# Patient Record
Sex: FEMALE | Race: WHITE | Employment: OTHER | URBAN - METROPOLITAN AREA
[De-identification: names, ages, dates, MRNs, and addresses within clinical notes are randomized per-mention and may not be internally consistent; named-entity substitution may affect disease eponyms.]

---

## 2018-03-15 ENCOUNTER — HOSPITAL ENCOUNTER (EMERGENCY)
Age: 73
Discharge: LEFT BEFORE DISCHARGE | End: 2018-03-15

## 2018-03-15 VITALS
WEIGHT: 133 LBS | HEART RATE: 107 BPM | RESPIRATION RATE: 14 BRPM | HEIGHT: 63 IN | BODY MASS INDEX: 23.57 KG/M2 | TEMPERATURE: 97.6 F | DIASTOLIC BLOOD PRESSURE: 88 MMHG | OXYGEN SATURATION: 99 % | SYSTOLIC BLOOD PRESSURE: 146 MMHG

## 2020-07-31 ENCOUNTER — HOSPITAL ENCOUNTER (OUTPATIENT)
Age: 75
Discharge: HOME | End: 2020-07-31
Payer: MEDICARE

## 2020-07-31 LAB
BASOPHILS ABSOLUTE: 0.02 E9/L (ref 0–0.2)
BASOPHILS RELATIVE PERCENT: 0.4 % (ref 0–2)
EOSINOPHILS ABSOLUTE: 0.1 E9/L (ref 0.05–0.5)
EOSINOPHILS RELATIVE PERCENT: 1.8 % (ref 0–6)
HCT VFR BLD CALC: 32.6 % (ref 34–48)
HEMOGLOBIN: 9.8 G/DL (ref 11.5–15.5)
IMMATURE GRANULOCYTES #: 0.04 E9/L
IMMATURE GRANULOCYTES %: 0.7 % (ref 0–5)
LYMPHOCYTES ABSOLUTE: 0.88 E9/L (ref 1.5–4)
LYMPHOCYTES RELATIVE PERCENT: 16.1 % (ref 20–42)
MCH RBC QN AUTO: 26.1 PG (ref 26–35)
MCHC RBC AUTO-ENTMCNC: 30.1 % (ref 32–34.5)
MCV RBC AUTO: 86.7 FL (ref 80–99.9)
MONOCYTES ABSOLUTE: 0.48 E9/L (ref 0.1–0.95)
MONOCYTES RELATIVE PERCENT: 8.8 % (ref 2–12)
NEUTROPHILS ABSOLUTE: 3.96 E9/L (ref 1.8–7.3)
NEUTROPHILS RELATIVE PERCENT: 72.2 % (ref 43–80)
PDW BLD-RTO: 17.5 FL (ref 11.5–15)
PLATELET # BLD: 210 E9/L (ref 130–450)
PMV BLD AUTO: 9.8 FL (ref 7–12)
RBC # BLD: 3.76 E12/L (ref 3.5–5.5)
WBC # BLD: 5.5 E9/L (ref 4.5–11.5)

## 2020-07-31 PROCEDURE — 85025 COMPLETE CBC W/AUTO DIFF WBC: CPT

## 2020-07-31 PROCEDURE — 36415 COLL VENOUS BLD VENIPUNCTURE: CPT

## 2020-11-07 ENCOUNTER — APPOINTMENT (OUTPATIENT)
Dept: GENERAL RADIOLOGY | Age: 75
DRG: 281 | End: 2020-11-07
Payer: MEDICARE

## 2020-11-07 ENCOUNTER — HOSPITAL ENCOUNTER (INPATIENT)
Age: 75
LOS: 3 days | Discharge: ANOTHER HOSPITAL | DRG: 281 | End: 2020-11-10
Attending: EMERGENCY MEDICINE | Admitting: FAMILY MEDICINE
Payer: MEDICARE

## 2020-11-07 PROBLEM — R77.8 ELEVATED TROPONIN: Status: ACTIVE | Noted: 2020-11-07

## 2020-11-07 PROBLEM — R79.89 ELEVATED TROPONIN: Status: ACTIVE | Noted: 2020-11-07

## 2020-11-07 LAB
ALBUMIN SERPL-MCNC: 4.2 G/DL (ref 3.5–5.2)
ALP BLD-CCNC: 52 U/L (ref 35–104)
ALT SERPL-CCNC: 11 U/L (ref 0–32)
ANION GAP SERPL CALCULATED.3IONS-SCNC: 11 MMOL/L (ref 7–16)
APTT: 30.5 SEC (ref 24.5–35.1)
APTT: 41.4 SEC (ref 24.5–35.1)
AST SERPL-CCNC: 26 U/L (ref 0–31)
BASOPHILS ABSOLUTE: 0.03 E9/L (ref 0–0.2)
BASOPHILS RELATIVE PERCENT: 0.5 % (ref 0–2)
BILIRUB SERPL-MCNC: 0.3 MG/DL (ref 0–1.2)
BUN BLDV-MCNC: 23 MG/DL (ref 8–23)
CALCIUM SERPL-MCNC: 9.6 MG/DL (ref 8.6–10.2)
CHLORIDE BLD-SCNC: 97 MMOL/L (ref 98–107)
CO2: 23 MMOL/L (ref 22–29)
CREAT SERPL-MCNC: 2.1 MG/DL (ref 0.5–1)
EOSINOPHILS ABSOLUTE: 0.08 E9/L (ref 0.05–0.5)
EOSINOPHILS RELATIVE PERCENT: 1.2 % (ref 0–6)
GFR AFRICAN AMERICAN: 28
GFR NON-AFRICAN AMERICAN: 23 ML/MIN/1.73
GLUCOSE BLD-MCNC: 241 MG/DL (ref 74–99)
HCT VFR BLD CALC: 37.5 % (ref 34–48)
HCT VFR BLD CALC: 38.1 % (ref 34–48)
HEMOGLOBIN: 11.2 G/DL (ref 11.5–15.5)
HEMOGLOBIN: 11.3 G/DL (ref 11.5–15.5)
IMMATURE GRANULOCYTES #: 0.05 E9/L
IMMATURE GRANULOCYTES %: 0.8 % (ref 0–5)
LYMPHOCYTES ABSOLUTE: 1.01 E9/L (ref 1.5–4)
LYMPHOCYTES RELATIVE PERCENT: 15.4 % (ref 20–42)
MCH RBC QN AUTO: 25.9 PG (ref 26–35)
MCH RBC QN AUTO: 26.4 PG (ref 26–35)
MCHC RBC AUTO-ENTMCNC: 29.4 % (ref 32–34.5)
MCHC RBC AUTO-ENTMCNC: 30.1 % (ref 32–34.5)
MCV RBC AUTO: 87.6 FL (ref 80–99.9)
MCV RBC AUTO: 88.2 FL (ref 80–99.9)
METER GLUCOSE: 174 MG/DL (ref 74–99)
METER GLUCOSE: 200 MG/DL (ref 74–99)
MONOCYTES ABSOLUTE: 0.5 E9/L (ref 0.1–0.95)
MONOCYTES RELATIVE PERCENT: 7.6 % (ref 2–12)
NEUTROPHILS ABSOLUTE: 4.87 E9/L (ref 1.8–7.3)
NEUTROPHILS RELATIVE PERCENT: 74.5 % (ref 43–80)
PDW BLD-RTO: 15.9 FL (ref 11.5–15)
PDW BLD-RTO: 15.9 FL (ref 11.5–15)
PLATELET # BLD: 219 E9/L (ref 130–450)
PLATELET # BLD: 227 E9/L (ref 130–450)
PMV BLD AUTO: 8.8 FL (ref 7–12)
PMV BLD AUTO: 8.9 FL (ref 7–12)
POTASSIUM REFLEX MAGNESIUM: 4.8 MMOL/L (ref 3.5–5)
PRO-BNP: ABNORMAL PG/ML (ref 0–450)
RBC # BLD: 4.28 E12/L (ref 3.5–5.5)
RBC # BLD: 4.32 E12/L (ref 3.5–5.5)
SODIUM BLD-SCNC: 131 MMOL/L (ref 132–146)
TOTAL PROTEIN: 6.7 G/DL (ref 6.4–8.3)
TROPONIN: 0.28 NG/ML (ref 0–0.03)
TROPONIN: 0.29 NG/ML (ref 0–0.03)
TROPONIN: 0.33 NG/ML (ref 0–0.03)
WBC # BLD: 6.5 E9/L (ref 4.5–11.5)
WBC # BLD: 7.8 E9/L (ref 4.5–11.5)

## 2020-11-07 PROCEDURE — 85025 COMPLETE CBC W/AUTO DIFF WBC: CPT

## 2020-11-07 PROCEDURE — 85730 THROMBOPLASTIN TIME PARTIAL: CPT

## 2020-11-07 PROCEDURE — 83880 ASSAY OF NATRIURETIC PEPTIDE: CPT

## 2020-11-07 PROCEDURE — 6370000000 HC RX 637 (ALT 250 FOR IP): Performed by: GENERAL PRACTICE

## 2020-11-07 PROCEDURE — 82962 GLUCOSE BLOOD TEST: CPT

## 2020-11-07 PROCEDURE — 85027 COMPLETE CBC AUTOMATED: CPT

## 2020-11-07 PROCEDURE — 80053 COMPREHEN METABOLIC PANEL: CPT

## 2020-11-07 PROCEDURE — 2060000000 HC ICU INTERMEDIATE R&B

## 2020-11-07 PROCEDURE — 84484 ASSAY OF TROPONIN QUANT: CPT

## 2020-11-07 PROCEDURE — 93005 ELECTROCARDIOGRAM TRACING: CPT | Performed by: GENERAL PRACTICE

## 2020-11-07 PROCEDURE — 6360000002 HC RX W HCPCS: Performed by: FAMILY MEDICINE

## 2020-11-07 PROCEDURE — 36415 COLL VENOUS BLD VENIPUNCTURE: CPT

## 2020-11-07 PROCEDURE — 99284 EMERGENCY DEPT VISIT MOD MDM: CPT

## 2020-11-07 PROCEDURE — 93005 ELECTROCARDIOGRAM TRACING: CPT | Performed by: FAMILY MEDICINE

## 2020-11-07 PROCEDURE — 6370000000 HC RX 637 (ALT 250 FOR IP): Performed by: FAMILY MEDICINE

## 2020-11-07 PROCEDURE — 71045 X-RAY EXAM CHEST 1 VIEW: CPT

## 2020-11-07 RX ORDER — VITAMIN B COMPLEX
2000 TABLET ORAL DAILY
Status: DISCONTINUED | OUTPATIENT
Start: 2020-11-07 | End: 2020-11-10 | Stop reason: HOSPADM

## 2020-11-07 RX ORDER — HYDROCHLOROTHIAZIDE 12.5 MG/1
12.5 TABLET ORAL DAILY
Status: DISCONTINUED | OUTPATIENT
Start: 2020-11-07 | End: 2020-11-07

## 2020-11-07 RX ORDER — HEPARIN SODIUM 1000 [USP'U]/ML
60 INJECTION, SOLUTION INTRAVENOUS; SUBCUTANEOUS ONCE
Status: COMPLETED | OUTPATIENT
Start: 2020-11-07 | End: 2020-11-07

## 2020-11-07 RX ORDER — NITROGLYCERIN 0.4 MG/1
0.4 TABLET SUBLINGUAL EVERY 5 MIN PRN
Status: DISCONTINUED | OUTPATIENT
Start: 2020-11-07 | End: 2020-11-10 | Stop reason: HOSPADM

## 2020-11-07 RX ORDER — HEPARIN SODIUM 10000 [USP'U]/100ML
12 INJECTION, SOLUTION INTRAVENOUS CONTINUOUS
Status: DISCONTINUED | OUTPATIENT
Start: 2020-11-07 | End: 2020-11-10 | Stop reason: HOSPADM

## 2020-11-07 RX ORDER — MULTIVITAMIN WITH IRON
1 TABLET ORAL DAILY
Status: DISCONTINUED | OUTPATIENT
Start: 2020-11-07 | End: 2020-11-10 | Stop reason: HOSPADM

## 2020-11-07 RX ORDER — HEPARIN SODIUM 1000 [USP'U]/ML
30 INJECTION, SOLUTION INTRAVENOUS; SUBCUTANEOUS PRN
Status: DISCONTINUED | OUTPATIENT
Start: 2020-11-07 | End: 2020-11-10 | Stop reason: HOSPADM

## 2020-11-07 RX ORDER — ASPIRIN 81 MG/1
324 TABLET, CHEWABLE ORAL ONCE
Status: COMPLETED | OUTPATIENT
Start: 2020-11-07 | End: 2020-11-07

## 2020-11-07 RX ORDER — BUDESONIDE AND FORMOTEROL FUMARATE DIHYDRATE 160; 4.5 UG/1; UG/1
2 AEROSOL RESPIRATORY (INHALATION) 2 TIMES DAILY
Status: DISCONTINUED | OUTPATIENT
Start: 2020-11-07 | End: 2020-11-07 | Stop reason: CLARIF

## 2020-11-07 RX ORDER — LOSARTAN POTASSIUM AND HYDROCHLOROTHIAZIDE 12.5; 5 MG/1; MG/1
1 TABLET ORAL DAILY
Status: DISCONTINUED | OUTPATIENT
Start: 2020-11-07 | End: 2020-11-07 | Stop reason: CLARIF

## 2020-11-07 RX ORDER — FUROSEMIDE 10 MG/ML
20 INJECTION INTRAMUSCULAR; INTRAVENOUS ONCE
Status: COMPLETED | OUTPATIENT
Start: 2020-11-07 | End: 2020-11-07

## 2020-11-07 RX ORDER — LOSARTAN POTASSIUM 50 MG/1
100 TABLET ORAL DAILY
Status: DISCONTINUED | OUTPATIENT
Start: 2020-11-07 | End: 2020-11-09

## 2020-11-07 RX ORDER — PROMETHAZINE HYDROCHLORIDE 25 MG/1
12.5 TABLET ORAL EVERY 6 HOURS PRN
Status: DISCONTINUED | OUTPATIENT
Start: 2020-11-07 | End: 2020-11-07 | Stop reason: ALTCHOICE

## 2020-11-07 RX ORDER — GLIMEPIRIDE 4 MG/1
4 TABLET ORAL 2 TIMES DAILY WITH MEALS
Status: DISCONTINUED | OUTPATIENT
Start: 2020-11-07 | End: 2020-11-10 | Stop reason: HOSPADM

## 2020-11-07 RX ORDER — ARFORMOTEROL TARTRATE 15 UG/2ML
15 SOLUTION RESPIRATORY (INHALATION) 2 TIMES DAILY
Status: DISCONTINUED | OUTPATIENT
Start: 2020-11-07 | End: 2020-11-10 | Stop reason: HOSPADM

## 2020-11-07 RX ORDER — SODIUM CHLORIDE 0.9 % (FLUSH) 0.9 %
10 SYRINGE (ML) INJECTION PRN
Status: DISCONTINUED | OUTPATIENT
Start: 2020-11-07 | End: 2020-11-10 | Stop reason: HOSPADM

## 2020-11-07 RX ORDER — BUDESONIDE 0.5 MG/2ML
0.5 INHALANT ORAL 2 TIMES DAILY
Status: DISCONTINUED | OUTPATIENT
Start: 2020-11-07 | End: 2020-11-10 | Stop reason: HOSPADM

## 2020-11-07 RX ORDER — RALOXIFENE HYDROCHLORIDE 60 MG/1
60 TABLET, FILM COATED ORAL NIGHTLY
COMMUNITY

## 2020-11-07 RX ORDER — FENOFIBRATE 160 MG/1
160 TABLET ORAL DAILY
Status: DISCONTINUED | OUTPATIENT
Start: 2020-11-07 | End: 2020-11-07

## 2020-11-07 RX ORDER — EMPAGLIFLOZIN 25 MG/1
25 TABLET, FILM COATED ORAL DAILY
COMMUNITY

## 2020-11-07 RX ORDER — ACETAMINOPHEN 650 MG/1
650 SUPPOSITORY RECTAL EVERY 6 HOURS PRN
Status: DISCONTINUED | OUTPATIENT
Start: 2020-11-07 | End: 2020-11-10 | Stop reason: HOSPADM

## 2020-11-07 RX ORDER — LOSARTAN POTASSIUM 100 MG/1
100 TABLET ORAL DAILY
Status: ON HOLD | COMMUNITY
End: 2020-11-12 | Stop reason: HOSPADM

## 2020-11-07 RX ORDER — POLYETHYLENE GLYCOL 3350 17 G/17G
17 POWDER, FOR SOLUTION ORAL DAILY PRN
Status: DISCONTINUED | OUTPATIENT
Start: 2020-11-07 | End: 2020-11-10 | Stop reason: HOSPADM

## 2020-11-07 RX ORDER — HEPARIN SODIUM 1000 [USP'U]/ML
60 INJECTION, SOLUTION INTRAVENOUS; SUBCUTANEOUS PRN
Status: DISCONTINUED | OUTPATIENT
Start: 2020-11-07 | End: 2020-11-10 | Stop reason: HOSPADM

## 2020-11-07 RX ORDER — MONTELUKAST SODIUM 10 MG/1
10 TABLET ORAL NIGHTLY
Status: DISCONTINUED | OUTPATIENT
Start: 2020-11-07 | End: 2020-11-10 | Stop reason: HOSPADM

## 2020-11-07 RX ORDER — ONDANSETRON 2 MG/ML
4 INJECTION INTRAMUSCULAR; INTRAVENOUS EVERY 6 HOURS PRN
Status: DISCONTINUED | OUTPATIENT
Start: 2020-11-07 | End: 2020-11-07 | Stop reason: ALTCHOICE

## 2020-11-07 RX ORDER — ACETAMINOPHEN 325 MG/1
650 TABLET ORAL EVERY 6 HOURS PRN
Status: DISCONTINUED | OUTPATIENT
Start: 2020-11-07 | End: 2020-11-10 | Stop reason: HOSPADM

## 2020-11-07 RX ORDER — LOSARTAN POTASSIUM 50 MG/1
50 TABLET ORAL DAILY
Status: DISCONTINUED | OUTPATIENT
Start: 2020-11-07 | End: 2020-11-07

## 2020-11-07 RX ORDER — SODIUM CHLORIDE 0.9 % (FLUSH) 0.9 %
10 SYRINGE (ML) INJECTION EVERY 12 HOURS SCHEDULED
Status: DISCONTINUED | OUTPATIENT
Start: 2020-11-07 | End: 2020-11-10 | Stop reason: HOSPADM

## 2020-11-07 RX ADMIN — HEPARIN SODIUM 3430 UNITS: 1000 INJECTION INTRAVENOUS; SUBCUTANEOUS at 14:08

## 2020-11-07 RX ADMIN — MONTELUKAST SODIUM 10 MG: 10 TABLET, FILM COATED ORAL at 19:56

## 2020-11-07 RX ADMIN — FUROSEMIDE 20 MG: 10 INJECTION, SOLUTION INTRAMUSCULAR; INTRAVENOUS at 14:09

## 2020-11-07 RX ADMIN — HEPARIN SODIUM 1710 UNITS: 1000 INJECTION INTRAVENOUS; SUBCUTANEOUS at 21:06

## 2020-11-07 RX ADMIN — HEPARIN SODIUM 14 UNITS/KG/HR: 10000 INJECTION, SOLUTION INTRAVENOUS at 21:05

## 2020-11-07 RX ADMIN — GLIMEPIRIDE 4 MG: 4 TABLET ORAL at 16:25

## 2020-11-07 RX ADMIN — INSULIN LISPRO 1 UNITS: 100 INJECTION, SOLUTION INTRAVENOUS; SUBCUTANEOUS at 16:25

## 2020-11-07 RX ADMIN — ASPIRIN 324 MG: 81 TABLET, CHEWABLE ORAL at 11:20

## 2020-11-07 RX ADMIN — INSULIN LISPRO 1 UNITS: 100 INJECTION, SOLUTION INTRAVENOUS; SUBCUTANEOUS at 20:04

## 2020-11-07 RX ADMIN — Medication 400 MG: at 14:22

## 2020-11-07 RX ADMIN — HEPARIN SODIUM 12 UNITS/KG/HR: 10000 INJECTION, SOLUTION INTRAVENOUS at 14:10

## 2020-11-07 ASSESSMENT — ENCOUNTER SYMPTOMS
EYE PAIN: 0
COUGH: 0
CHEST TIGHTNESS: 0
NAUSEA: 0
DIARRHEA: 0
SINUS PAIN: 0
ABDOMINAL PAIN: 0
SORE THROAT: 0
VOMITING: 0
SHORTNESS OF BREATH: 1
WHEEZING: 0
CHOKING: 0

## 2020-11-07 ASSESSMENT — PAIN SCALES - GENERAL
PAINLEVEL_OUTOF10: 0

## 2020-11-07 NOTE — ED PROVIDER NOTES
Desire Garcia is a 81-year-old female who presents with chief complaint of palpitations and shortness of breath. History comes primarily from the patient. Past medical history includes abnormal heartbeat, diabetes, hypertension. She states that last night she developed a sensation of palpitations as well as difficulty catching her breath. She states that she has never had these sorts of symptoms before or as intensely. This morning she again had recurrence of the symptoms activated spontaneously resolved the night before. She denies any fever, chills, sick contacts, recent surgery, immobility. Because the persistence of her symptoms she presented to [facility] emergency department for further evaluation treatment. On arrival, she was assessed with history, physical exam, imaging studies, laboratory studies, EKG, vital signs. Her vital signs were stable on arrival and she was afebrile. Review of Systems   Constitutional: Positive for activity change. Negative for appetite change, chills and fever. HENT: Negative for sinus pain and sore throat. Eyes: Negative for pain and visual disturbance. Respiratory: Positive for shortness of breath. Negative for cough, choking, chest tightness and wheezing. Cardiovascular: Positive for palpitations. Negative for chest pain. Gastrointestinal: Negative for abdominal pain, diarrhea, nausea and vomiting. Genitourinary: Negative for hematuria. Musculoskeletal: Negative for neck pain and neck stiffness. Skin: Negative for rash. Neurological: Negative for tremors, syncope and weakness. Psychiatric/Behavioral: Negative for confusion. Physical Exam  Vitals signs and nursing note reviewed. Constitutional:       Appearance: She is well-developed. HENT:      Head: Normocephalic and atraumatic. Eyes:      Pupils: Pupils are equal, round, and reactive to light.    Neck:      Musculoskeletal: Normal range of motion and neck supple. Cardiovascular:      Rate and Rhythm: Normal rate. Rhythm irregular. Pulmonary:      Effort: Pulmonary effort is normal. No respiratory distress. Breath sounds: Normal breath sounds. No decreased breath sounds, wheezing or rales. Abdominal:      General: Bowel sounds are normal.      Palpations: Abdomen is soft. Tenderness: There is no abdominal tenderness. There is no guarding or rebound. Skin:     General: Skin is warm and dry. Capillary Refill: Capillary refill takes less than 2 seconds. Neurological:      General: No focal deficit present. Mental Status: She is alert and oriented to person, place, and time. Cranial Nerves: No cranial nerve deficit. Coordination: Coordination normal.          Procedures     MDM  Number of Diagnoses or Management Options  Elevated troponin:   NSTEMI (non-ST elevated myocardial infarction) [facility]):   Diagnosis management comments: The patient's emergency department work-up including history, physical exam, vital signs, laboratory studies, imaging studies and reevaluation after initial treatment are concerning for significant pathology requiring further management and care in the inpatient setting. Specifically, Pat has troponin elevation with nonspecific ST segment changes that are worrisome for a non-STEMI. This was discussed with her cardiology group (Dr. Wiseman Other for Dr. Jolene Ugalde) who advised that the patient be admitted to [facility] and they will follow the patient in the inpatient setting. This information was relayed to the patient who understood this plan of care and was amenable to the plan.   Patient was discussed with the admitting service (Dr. Lisa Melara for Dr. Rossy Pro) who concurred with the decision for admission, and have agreed to admit the patient to intermediate       ED Course as of Nov 07 1207   Sat Nov 07, 2020   1114 ATTENDING PROVIDER ATTESTATION:     I have personally performed and/or been somewhat worse as well. My findings: Daria Gonzalez is a 77 y.o. female whom is in no distress. Physical exam reveals she is alert and oriented. Heart is irregular, lungs are coarse bilaterally with faint crackles. Abdomen soft nontender. Extremities with good strength and distal pulses. Skin is warm and dry. My plan: Symptomatic and supportive care. Labs, EKG, x-ray. Electronically signed by Amna Escobar DO on 11/7/20 at 11:14 AM EST          [TG]      ED Course User Index  [TG] Amna Escobar DO       --------------------------------------------- PAST HISTORY ---------------------------------------------  Past Medical History:  has a past medical history of Anemia, Asthma, Colon polyps, Diabetes ([facility] [place] [address] Diabetes mellitus ([facility] [place] [address] Environmental allergies, Full dentures, Gastric ulcer, GERD (gastroesophageal reflux disease), High cholesterol, Hypertension, and Osteopenia. Past Surgical History:  has a past surgical history that includes Knee Arthrocentesis; Knee arthroscopy (Left, 1980's); Cholecystectomy, open (1980's early); Tubal ligation; Colonoscopy; Upper gastrointestinal endoscopy; Endoscopy, colon, diagnostic; Upper gastrointestinal endoscopy (04/18/2014); Appendectomy; and other surgical history (10/13/2014). Social History:  reports that she has never smoked. She has never used smokeless tobacco. She reports current alcohol use. She reports that she does not use drugs. Family History: family history includes Cancer in her father; Heart Disease in her father; Stroke in her mother. The patients home medications have been reviewed.     Allergies: Aspirin; Statins; and Nsaids    -------------------------------------------------- RESULTS -------------------------------------------------    LABS:  Results for orders placed or performed during the hospital encounter of 11/07/20   CBC Auto Differential   Result Value Ref Range    WBC 6.5 4.5 - 11.5 E9/L    RBC 4.32 3.50 - 5.50 E12/L    Hemoglobin 11.2 (L) 11.5 - 15.5 g/dL    Hematocrit 38.1 34.0 - 48.0 %    MCV 88.2 80.0 - 99.9 fL    MCH 25.9 (L) 26.0 - 35.0 pg    MCHC 29.4 (L) 32.0 - 34.5 %    RDW 15.9 (H) 11.5 - 15.0 fL    Platelets 756 085 - 176 E9/L    MPV 8.9 7.0 - 12.0 fL    Neutrophils % 74.5 43.0 - 80.0 %    Immature Granulocytes % 0.8 0.0 - 5.0 %    Lymphocytes % 15.4 (L) 20.0 - 42.0 %    Monocytes % 7.6 2.0 - 12.0 %    Eosinophils % 1.2 0.0 - 6.0 %    Basophils % 0.5 0.0 - 2.0 %    Neutrophils Absolute 4.87 1.80 - 7.30 E9/L    Immature Granulocytes # 0.05 E9/L    Lymphocytes Absolute 1.01 (L) 1.50 - 4.00 E9/L    Monocytes Absolute 0.50 0.10 - 0.95 E9/L    Eosinophils Absolute 0.08 0.05 - 0.50 E9/L    Basophils Absolute 0.03 0.00 - 0.20 E9/L   Comprehensive Metabolic Panel w/ Reflex to MG   Result Value Ref Range    Sodium 131 (L) 132 - 146 mmol/L    Potassium reflex Magnesium 4.8 3.5 - 5.0 mmol/L    Chloride 97 (L) 98 - 107 mmol/L    CO2 23 22 - 29 mmol/L    Anion Gap 11 7 - 16 mmol/L    Glucose 241 (H) 74 - 99 mg/dL    BUN 23 8 - 23 mg/dL    CREATININE 2.1 (H) 0.5 - 1.0 mg/dL    GFR Non-African American 23 >=60 mL/min/1.73    GFR African American 28     Calcium 9.6 8.6 - 10.2 mg/dL    Total Protein 6.7 6.4 - 8.3 g/dL    Alb 4.2 3.5 - 5.2 g/dL    Total Bilirubin 0.3 0.0 - 1.2 mg/dL    Alkaline Phosphatase 52 35 - 104 U/L    ALT 11 0 - 32 U/L    AST 26 0 - 31 U/L   Troponin   Result Value Ref Range    Troponin 0.33 (H) 0.00 - 0.03 ng/mL   Brain Natriuretic Peptide   Result Value Ref Range    Pro-BNP 12,538 (H) 0 - 450 pg/mL   EKG 12 Lead   Result Value Ref Range    Ventricular Rate 86 BPM    Atrial Rate 100 BPM    QRS Duration 96 ms    Q-T Interval 492 ms    QTc Calculation (Bazett) 588 ms    P Axis 67 degrees    R Axis -28 degrees    T Axis 35 degrees       RADIOLOGY:  XR CHEST PORTABLE   Final Result   Slight cardiomegaly      No radiographic evidence of definite acute cardiopulmonary disease in the   visualized chest EKG #1:  Interpreted by emergency department physician unless otherwise noted. Time:  0941    Rate: 81  Rhythm: Sinus. Interpretation: normal sinus rhythm, nonspecific ST and T waves changes, 2nd degree AV block (Mobitz type 1).        ------------------------- NURSING NOTES AND VITALS REVIEWED ---------------------------  Date / Time Roomed:  11/7/2020  9:45 AM  ED Bed Assignment:  16/16    The nursing notes within the ED encounter and vital signs as below have been reviewed. Patient Vitals for the past 24 hrs:   BP Temp Pulse Resp SpO2 Height Weight   11/07/20 1201 137/76 -- 85 -- 99 % -- --   11/07/20 1116 (!) 144/81 -- 88 -- 100 % -- --   11/07/20 1001 (!) 162/84 -- 84 16 99 % -- --   11/07/20 0948 (!) 171/86 97.8 °F (36.6 °C) 77 16 100 % 5' 3\" (1.6 m) 127 lb (57.6 kg)       Oxygen Saturation Interpretation: Normal    ------------------------------------------ PROGRESS NOTES ------------------------------------------  Re-evaluation(s):  Time: 12:09 PM EST  Patients symptoms show no change  Repeat physical examination is not changed    Counseling:  I have spoken with the patient and discussed todays results, in addition to providing specific details for the plan of care and counseling regarding the diagnosis and prognosis. Their questions are answered at this time and they are agreeable with the plan of admission.    --------------------------------- ADDITIONAL PROVIDER NOTES ---------------------------------  Consultations:  Time: 12:09 PM EST. Spoke with Dr. Gemma Chao for Dr. Ellis Vieira. Discussed case. They will admit the patient. This patient's ED course included: a personal history and physicial examination, re-evaluation prior to disposition and multiple bedside re-evaluations    This patient has remained hemodynamically stable during their ED course. Diagnosis:  1.  NSTEMI (non-ST elevated myocardial infarction) ([facility] [address]    2. Elevated troponin        Disposition:  Patient's disposition: Admit to telemetry  Patient's condition is fair.              [address] DO  Resident  11/07/20 3247

## 2020-11-07 NOTE — LETTER
Beneficiary Notification Letter  BPCI Advanced     Your Doctor or [address],    We wanted to let you know that your health care provider, [address] [place] [address] Andre, has volunteered to take part in our [facility] for [facility] & Medicaid Services (CMS) Bundled Payments for [facility] (BPCI Advanced). This doesnt change your Medicare rights or benefits and you dont need to do anything. What are bundled payments? A bundled payment combines, or bundles together, payments that Medicare makes to your health care providers for the many different kinds of medical services you might get in a specific time period. In BPCI Advanced, this time period could include a hospital inpatient stay or outpatient procedure, plus 90 days. Why would Medicare bundle payments? Bundled payments are thought of as a value-based way to pay because health care providers are responsible for both the quality and cost of medical care they give. This is a relatively new way of paying health care providers compared to thefee-for-service way Medicare has traditionally paid, where providers are paid separately for each service they provide. Bundled payments encourage these providers to work together to provide better, more coordinated care during your hospital stay, or outpatient procedure, and through your recovery. What does BPCI Advance mean for me? Youre more likely to get even better care when hospitals, doctors, and other health care providers work together. In BPCI Advanced, hospitals, doctors, and other health care providers may be rewarded for providing better, more coordinated health care. Medicare will watch BPCI Advanced participants closely to make sure that you and other patients keep getting efficient, high quality care. What do I need to know about BPCI Advanced? Whats most important for you to know is that your Medicare rights and benefits wont change because your health care provider is participating in [address] [place]. Medicare will keep covering all of your medically necessary services. Even though Medicare will pay your doctor in a different way under BPCI Advanced, how much you have to pay wont change. Health care providers and suppliers who are enrolled in Medicare will submit their Medicare claims like they always have. Youll have all the same Medicare rights and protections, including the right to choose which hospital, doctor, or other health care provider you see. If you dont want to get care from a health care provider whos participating in [address] [place], then youll have to choose a different health care provider whos not participating in the Model. How can I give feedback about my health care? Medicare might ask you to take a voluntary survey about the services and care you received from [address] [place] [address] during your hospital stay or outpatient procedure and for a specific period of time afterwards. You can decide whether you want to take the voluntary survey, but if you do, itll help Medicare make BPCI Advanced and the care of other Medicare patients better. If you have concerns or complaints about your care, you can:   · Talk to your doctor or health care provider. · Contact your Beneficiary and Family Centered Care Quality Improvement   Organization [facility]ET TORRES [facility]). You can get your [employer]s phone number  at  Medicare.gov/contacts or by calling 1-800-MEDICARE. TTY users can call  2-258.991.4222. Where can I learn more about BPCI Advanced? Learn more about BPCI Advanced at https://innovation.cms.gov/initiatives/bpci-advanced/:  · A list of all the hospitals and physician group practices in the country participating in [address] [place]. · All of the inpatient and outpatient Clinical Episodes that are currently included under BPCI Advanced. A Clinical Episode is a grouping of medical conditions or diagnoses that are included in the [address] [facility].

## 2020-11-08 PROBLEM — I10 ESSENTIAL HYPERTENSION: Chronic | Status: ACTIVE | Noted: 2020-11-08

## 2020-11-08 PROBLEM — E11.9 TYPE 2 DIABETES MELLITUS (HCC): Status: ACTIVE | Noted: 2020-11-08

## 2020-11-08 PROBLEM — E11.9 TYPE 2 DIABETES MELLITUS (HCC): Chronic | Status: ACTIVE | Noted: 2020-11-08

## 2020-11-08 PROBLEM — I34.0 MITRAL REGURGITATION: Status: ACTIVE | Noted: 2020-11-08

## 2020-11-08 PROBLEM — N18.4 CKD (CHRONIC KIDNEY DISEASE) STAGE 4, GFR 15-29 ML/MIN (HCC): Chronic | Status: ACTIVE | Noted: 2020-11-08

## 2020-11-08 PROBLEM — I21.4 NON-ST ELEVATION MI (NSTEMI) (HCC): Status: ACTIVE | Noted: 2020-11-08

## 2020-11-08 PROBLEM — I10 ESSENTIAL HYPERTENSION: Status: ACTIVE | Noted: 2020-11-08

## 2020-11-08 LAB
ANION GAP SERPL CALCULATED.3IONS-SCNC: 11 MMOL/L (ref 7–16)
APTT: 54.1 SEC (ref 24.5–35.1)
BUN BLDV-MCNC: 34 MG/DL (ref 8–23)
CALCIUM SERPL-MCNC: 9.4 MG/DL (ref 8.6–10.2)
CHLORIDE BLD-SCNC: 99 MMOL/L (ref 98–107)
CO2: 22 MMOL/L (ref 22–29)
CREAT SERPL-MCNC: 2.3 MG/DL (ref 0.5–1)
EKG ATRIAL RATE: 119 BPM
EKG Q-T INTERVAL: 444 MS
EKG QRS DURATION: 98 MS
EKG QTC CALCULATION (BAZETT): 515 MS
EKG R AXIS: -26 DEGREES
EKG T AXIS: 53 DEGREES
EKG VENTRICULAR RATE: 81 BPM
GFR AFRICAN AMERICAN: 25
GFR NON-AFRICAN AMERICAN: 21 ML/MIN/1.73
GLUCOSE BLD-MCNC: 186 MG/DL (ref 74–99)
HCT VFR BLD CALC: 34 % (ref 34–48)
HEMOGLOBIN: 10.5 G/DL (ref 11.5–15.5)
LV EF: 38 %
LVEF MODALITY: NORMAL
MCH RBC QN AUTO: 26.4 PG (ref 26–35)
MCHC RBC AUTO-ENTMCNC: 30.9 % (ref 32–34.5)
MCV RBC AUTO: 85.6 FL (ref 80–99.9)
METER GLUCOSE: 186 MG/DL (ref 74–99)
METER GLUCOSE: 193 MG/DL (ref 74–99)
METER GLUCOSE: 215 MG/DL (ref 74–99)
METER GLUCOSE: 237 MG/DL (ref 74–99)
PDW BLD-RTO: 15.8 FL (ref 11.5–15)
PLATELET # BLD: 197 E9/L (ref 130–450)
PMV BLD AUTO: 9.1 FL (ref 7–12)
POTASSIUM REFLEX MAGNESIUM: 4.3 MMOL/L (ref 3.5–5)
RBC # BLD: 3.97 E12/L (ref 3.5–5.5)
SODIUM BLD-SCNC: 132 MMOL/L (ref 132–146)
WBC # BLD: 6.6 E9/L (ref 4.5–11.5)

## 2020-11-08 PROCEDURE — 85730 THROMBOPLASTIN TIME PARTIAL: CPT

## 2020-11-08 PROCEDURE — 93306 TTE W/DOPPLER COMPLETE: CPT

## 2020-11-08 PROCEDURE — 2060000000 HC ICU INTERMEDIATE R&B

## 2020-11-08 PROCEDURE — 6370000000 HC RX 637 (ALT 250 FOR IP): Performed by: INTERNAL MEDICINE

## 2020-11-08 PROCEDURE — 82962 GLUCOSE BLOOD TEST: CPT

## 2020-11-08 PROCEDURE — 80048 BASIC METABOLIC PNL TOTAL CA: CPT

## 2020-11-08 PROCEDURE — 6360000002 HC RX W HCPCS: Performed by: FAMILY MEDICINE

## 2020-11-08 PROCEDURE — 6370000000 HC RX 637 (ALT 250 FOR IP): Performed by: FAMILY MEDICINE

## 2020-11-08 PROCEDURE — 93010 ELECTROCARDIOGRAM REPORT: CPT | Performed by: INTERNAL MEDICINE

## 2020-11-08 PROCEDURE — 36415 COLL VENOUS BLD VENIPUNCTURE: CPT

## 2020-11-08 PROCEDURE — 85027 COMPLETE CBC AUTOMATED: CPT

## 2020-11-08 RX ORDER — CLOPIDOGREL BISULFATE 75 MG/1
75 TABLET ORAL DAILY
Status: DISCONTINUED | OUTPATIENT
Start: 2020-11-08 | End: 2020-11-09

## 2020-11-08 RX ORDER — DEXTROSE MONOHYDRATE 25 G/50ML
12.5 INJECTION, SOLUTION INTRAVENOUS PRN
Status: DISCONTINUED | OUTPATIENT
Start: 2020-11-08 | End: 2020-11-10 | Stop reason: HOSPADM

## 2020-11-08 RX ORDER — NICOTINE POLACRILEX 4 MG
15 LOZENGE BUCCAL PRN
Status: DISCONTINUED | OUTPATIENT
Start: 2020-11-08 | End: 2020-11-10 | Stop reason: HOSPADM

## 2020-11-08 RX ORDER — DEXTROSE MONOHYDRATE 50 MG/ML
100 INJECTION, SOLUTION INTRAVENOUS PRN
Status: DISCONTINUED | OUTPATIENT
Start: 2020-11-08 | End: 2020-11-10 | Stop reason: HOSPADM

## 2020-11-08 RX ADMIN — INSULIN LISPRO 1 UNITS: 100 INJECTION, SOLUTION INTRAVENOUS; SUBCUTANEOUS at 16:11

## 2020-11-08 RX ADMIN — HEPARIN SODIUM 14 UNITS/KG/HR: 10000 INJECTION, SOLUTION INTRAVENOUS at 21:48

## 2020-11-08 RX ADMIN — Medication 400 MG: at 07:51

## 2020-11-08 RX ADMIN — INSULIN LISPRO 2 UNITS: 100 INJECTION, SOLUTION INTRAVENOUS; SUBCUTANEOUS at 11:05

## 2020-11-08 RX ADMIN — CLOPIDOGREL BISULFATE 75 MG: 75 TABLET ORAL at 15:02

## 2020-11-08 RX ADMIN — GLIMEPIRIDE 4 MG: 4 TABLET ORAL at 16:11

## 2020-11-08 RX ADMIN — GLIMEPIRIDE 4 MG: 4 TABLET ORAL at 07:50

## 2020-11-08 RX ADMIN — MONTELUKAST SODIUM 10 MG: 10 TABLET, FILM COATED ORAL at 21:49

## 2020-11-08 RX ADMIN — Medication 2000 UNITS: at 07:51

## 2020-11-08 RX ADMIN — INSULIN LISPRO 1 UNITS: 100 INJECTION, SOLUTION INTRAVENOUS; SUBCUTANEOUS at 06:45

## 2020-11-08 RX ADMIN — INSULIN LISPRO 1 UNITS: 100 INJECTION, SOLUTION INTRAVENOUS; SUBCUTANEOUS at 21:49

## 2020-11-08 RX ADMIN — LOSARTAN POTASSIUM 100 MG: 50 TABLET, FILM COATED ORAL at 07:51

## 2020-11-08 ASSESSMENT — PAIN SCALES - GENERAL
PAINLEVEL_OUTOF10: 0

## 2020-11-08 NOTE — CONSULTS
[facility] cardiology/the [facility] of [address] [place] [address]    Name: Dimitri Rose    Age: 77 y.o. Date of Admission: 11/7/2020  9:45 AM    Date of Service: 11/8/2020    Reason for Consultation: Chest aching, troponin 0 0.33  Referring Physician:     History of Present Illness: The patient is a 77y.o. year old female with a known history of diabetes for 20 years, chronic kidney disease with baseline creatinine around 2.0, echocardiogram October 2019 LVEF 55%, 3+ MR, chronic hypertension, history of second-degree type I AV block. She came to the hospital with a 1 day history of intermittent chest aching but is also been aware of palpitations. She describes exertional chest aching and shortness of breath walking into the emergency room. She usually has baseline dyspnea on exertion but this was clearly different and worse. There was no associated diaphoresis. She denies any unstable angina, bleeding or stroke symptoms. No falls or trauma. Past Medical History: As above, hyperlipidemia, DM, chronic kidney disease has seen nephrology for at least 2 years. She denies any known history of cardiovascular disease or peripheral vascular disease. She denies any history of syncope or near syncope. Past surgical history tubal ligation, left knee surgery. Review of Systems:     Constitutional: No fever, chills, sweats  Cardiac: As per HPI  Pulmonary: No cough, wheeze, hemoptysis  HEENT: No visual disturbances or difficult swallowing  GI: No nausea, vomiting, diarrhea, abdominal pain, rectal bleeding  : No dysuria or hematuria  Endocrine: No excessive thirst, heat or cold intolerance. Musculoskeletal: No joint pain or muscle aches.  No claudication  Skin: No skin breakdown or rashes  Neuro: No headache, confusion, or seizures  Psych: No depression, anxiety      Family History:  Family History   Problem Relation Age of Onset    Stroke Mother     Cancer Father         colon    Heart Disease Father        Social History:  Social History     Socioeconomic History    Marital status: Single     Spouse name: Not on file    Number of children: Not on file    Years of education: Not on file    Highest education level: Not on file   Occupational History    Not on file   Social Needs    Financial resource strain: Not on file    Food insecurity     Worry: Not on file     Inability: Not on file    Transportation needs     Medical: Not on file     Non-medical: Not on file   Tobacco Use    Smoking status: Never Smoker    Smokeless tobacco: Never Used   Substance and Sexual Activity    Alcohol use: Yes     Alcohol/week: 0.0 standard drinks     Comment: socially    Drug use: No    Sexual activity: Not Currently     Partners: Male   Lifestyle    Physical activity     Days per week: Not on file     Minutes per session: Not on file    Stress: Not on file   Relationships    Social connections     Talks on phone: Not on file     Gets together: Not on file     Attends [employer] service: Not on file     Active member of club or organization: Not on file     Attends meetings of clubs or organizations: Not on file     Relationship status: Not on file    Intimate partner violence     Fear of current or ex partner: Not on file     Emotionally abused: Not on file     Physically abused: Not on file     Forced sexual activity: Not on file   Other Topics Concern    Not on file   Social History Narrative    Not on file       Allergies:   Allergies   Allergen Reactions    Aspirin Hives    Statins Other (See Comments)     Muscle weakness    Nsaids Rash       Current Medications:  Current Facility-Administered Medications   Medication Dose Route Frequency Provider Last Rate Last Dose    glucose (GLUTOSE) 40 % oral gel 15 g  15 g Oral PRN Vicki Drone, DO        dextrose 50 % IV solution  12.5 g Intravenous PRN Vicki Drone, DO        glucagon (rDNA) injection 1 mg  1 mg Intramuscular PRN REINALDO BRENT Vargas, DO        dextrose 5 % solution  100 mL/hr Intravenous PRN Dellar Falkner, DO        clopidogrel (PLAVIX) tablet 75 mg  75 mg Oral Daily Tayla Patel MD        perflutren lipid microspheres (DEFINITY) injection 1.65 mg  1.5 mL Intravenous ONCE PRN Tayla Patel MD        Vitamin D (CHOLECALCIFEROL) tablet 2,000 Units  2,000 Units Oral Daily Dellar Falkner, DO   2,000 Units at 11/08/20 0751    glimepiride (AMARYL) tablet 4 mg  4 mg Oral BID  Dellar Falkner, DO   4 mg at 11/08/20 0750    magnesium oxide (MAG-OX) tablet 400 mg  400 mg Oral Daily Dellar Falkner, DO   400 mg at 11/08/20 0751    montelukast (SINGULAIR) tablet 10 mg  10 mg Oral Nightly Dellar Falkner, DO   10 mg at 11/07/20 1956    multivitamin 1 tablet  1 tablet Oral Daily Dellar Falkner, DO        sodium chloride flush 0.9 % injection 10 mL  10 mL Intravenous 2 times per day Dellar Falkner, DO        sodium chloride flush 0.9 % injection 10 mL  10 mL Intravenous PRN Dellar Falkner, DO        acetaminophen (TYLENOL) tablet 650 mg  650 mg Oral Q6H PRN Dellar Falkner, DO        Or    acetaminophen (TYLENOL) suppository 650 mg  650 mg Rectal Q6H PRN Dellar Falkner, DO        polyethylene glycol (GLYCOLAX) packet 17 g  17 g Oral Daily PRN Dellar Falkner, DO        heparin (porcine) injection 3,430 Units  60 Units/kg Intravenous PRN Dellar Falkner, DO        heparin (porcine) injection 1,710 Units  30 Units/kg Intravenous PRN Dellar Falkner, DO   1,710 Units at 11/07/20 2106    heparin 25,000 units in dextrose 5% 250 mL infusion  12 Units/kg/hr Intravenous Continuous Dellar Falkner, DO 8 mL/hr at 11/07/20 2105 14 Units/kg/hr at 11/07/20 2105    nitroGLYCERIN (NITROSTAT) SL tablet 0.4 mg  0.4 mg Sublingual Q5 Min PRN Dellar Falkner, DO        insulin lispro (HUMALOG) injection vial 0-6 Units  0-6 Units Subcutaneous TID PERFECTO Leigh DO   2 Units at 11/08/20 1105    insulin lispro (HUMALOG) injection vial 0-3 Units  0-3 Units Subcutaneous Nightly Megan Frostproof, DO   1 Units at 11/07/20 2004    Arformoterol Tartrate (BROVANA) nebulizer solution 15 mcg  15 mcg Nebulization BID Megan Frostproof, DO        And    budesonide (PULMICORT) nebulizer suspension 500 mcg  0.5 mg Nebulization BID Megan Frostproof, DO        trimethobenzamide Asenath Jair) injection 200 mg  200 mg Intramuscular Q6H PRN Megan Frostproof, DO        losartan (COZAAR) tablet 100 mg  100 mg Oral Daily Megan Frostproof, DO   100 mg at 11/08/20 0751      dextrose      heparin (PORCINE) Infusion 14 Units/kg/hr (11/07/20 2105)       Physical Exam:  BP (!) 126/59   Pulse 69   Temp 97.6 °F (36.4 °C) (Oral)   Resp 16   Ht 5' 3\" (1.6 m)   Wt 124 lb 3 oz (56.3 kg)   SpO2 98%   BMI 22.00 kg/m²   Weight change: Wt Readings from Last 3 Encounters:   11/08/20 124 lb 3 oz (56.3 kg)   12/29/16 133 lb 14.4 oz (60.7 kg)   10/14/15 135 lb 9.6 oz (61.5 kg)         General: Awake, alert, oriented x3, no acute distress  HEENT: Unremarkable  Neck: No JVD or bruits. Cardiac: Regular rate and rhythm, normal S1 and S2, no extra heart sounds, murmurs, heaves, thrills  Resp: Lungs clear without wheezing or crackles. No accessory muscle use or retraction  Abdomen: soft, nontender, nondistended, no gross organomegaly or mass  Skin: Warm and dry, no cyanosis. Musculoskeletal: normal tone and strength in the upper and lower extremities bilaterally  Neuro: Grossly unremarkable  Psych: Cooperative, and normal affect    Intake/Output:    Intake/Output Summary (Last 24 hours) at 11/8/2020 1333  Last data filed at 11/8/2020 1250  Gross per 24 hour   Intake 1158.2 ml   Output 1100 ml   Net 58.2 ml     I/O this shift:   In: 5 [P.O.:420]  Out: -     Laboratory Tests:  Lab Results   Component Value Date    CREATININE 2.3 (H) 11/08/2020    BUN 34 (H) 11/08/2020     11/08/2020    K 4.3 11/08/2020    CL 99 11/08/2020    CO2 22 11/08/2020     No results for input(s): CKTOTAL, CKMB in the last 72 hours. Invalid input(s): TROPONONI  No results found for: BNP  Lab Results   Component Value Date    WBC 6.6 11/08/2020    RBC 3.97 11/08/2020    HGB 10.5 11/08/2020    HCT 34.0 11/08/2020    MCV 85.6 11/08/2020    MCH 26.4 11/08/2020    MCHC 30.9 11/08/2020    RDW 15.8 11/08/2020     11/08/2020    MPV 9.1 11/08/2020     Recent Labs     11/07/20  1004   ALKPHOS 52   ALT 11   AST 26   PROT 6.7   BILITOT 0.3   LABALBU 4.2     No results found for: MG  Lab Results   Component Value Date    PROTIME 31.0 01/20/2018    INR 2.7 01/20/2018     No results found for: TSH  No components found for: CHLPL  No results found for: TRIG  No results found for: HDL  No results found for: [address]  Lab Results   Component Value Date    INR 2.7 01/20/2018       Admission ECG November 7, 2020 reviewed by me revealed normal sinus rhythm nonspecific ST-T wave changes. Follow-up EKG November 8, 2020 with new T wave inversions across the precordium. I personally reviewed her telemetry and it shows normal sinus rhythm with second-degree type I AV block. ASSESSMENT / PLAN:    1. Non-ST elevation myocardial infarction with presenting chest pressure and progressive shortness of breath troponin 0 0.33 then 0.28 then 0.29 and is subsequently evolved T wave changes across the precordium on EKG. With creatinine of 2.1 yesterday and 2.3 today would certainly be concerned about giving her any significant dye load for heart catheterization at this point I would suggest nuclear stress test to be done tomorrow for further risk stratification and if significant ischemia noted then consider heart catheterization. Currently receiving IV heparin, no aspirin with reported allergy and have started Plavix 75 mg daily. No beta-blocker with documented second-degree type I AV block. #2 chronic kidney disease for at least 2 years by her report.   Avoid nonsteroidals, on ARB at this time beneficial with diabetes and would not aggressively diurese her at this point as she may require a dye load in the near future for heart catheterization. #3 progressive dyspnea on exertion and BNP elevated at 12,538 on admission but without significant lower extremity edema or weight gain per the patient. Will check echocardiogram to follow-up LV function and mitral valve regurgitation. Would not aggressively diurese her at this point without any reported orthopnea and do not want to dehydrate her prior to possible heart catheterization if required. #4 moderate to moderately severe mitral valve regurgitation previously documented on echocardiogram October 2019. Continue afterload reduction and we will check another echocardiogram this admission. #5 hypertension and follow-up blood pressure on current medical regimen. Plan n.p.o. after midnight. Lexiscan stress test tomorrow for further restratification.             Electronically signed by Ita Hebert MD on 11/8/2020 at 1:33 PM

## 2020-11-08 NOTE — H&P
76 yo  Mirela Chow is a 80-year-old female who presents with chief complaint of palpitations and shortness of breath. History comes primarily from the patient. Past medical history includes abnormal heartbeat, diabetes, hypertension. She states that last night she developed a sensation of palpitations as well as difficulty catching her breath. She states that she has never had these sorts of symptoms before or as intensely. This morning she again had recurrence of the symptoms activated spontaneously resolved the night before. She denies any fever, chills, sick contacts, recent surgery, immobility. Because the persistence of her symptoms she presented to [facility] emergency department for further evaluation treatment. On arrival, she was assessed with history, physical exam, imaging studies, laboratory studies, EKG, vital signs.   Her vital signs were stable on arrival and she was afebrile.   pt admitted for further work up and tx  Past Medical History:   Diagnosis Date    Anemia     4/2014 put on iron supplement    Asthma     Bronchial asthma, getting better, going to Dr. Mario Choe,  weaning off allergy shots and inhalers    Colon polyps 2/20/2014    Diabetes ([address]     Diabetes mellitus ([address]     am glucose running 130    Environmental allergies     spring/winter    Full dentures     Gastric ulcer 2/20/2014    GERD (gastroesophageal reflux disease)     High cholesterol     Hypertension     124/70, has been good    Osteopenia        Past Surgical History:   Procedure Laterality Date    APPENDECTOMY      CHOLECYSTECTOMY, OPEN  1980's early    COLONOSCOPY      ENDOSCOPY, COLON, DIAGNOSTIC      KNEE ARTHROCENTESIS      KNEE ARTHROSCOPY Left 1980's    OTHER SURGICAL HISTORY  10/13/2014    antrogade balloon enteroscopy with biopsy of polyp and scleratherapy    TUBAL LIGATION      UPPER GASTROINTESTINAL ENDOSCOPY      UPPER GASTROINTESTINAL ENDOSCOPY  04/18/2014       Allergies on file   Relationships    Social connections     Talks on phone: Not on file     Gets together: Not on file     Attends [employer] service: Not on file     Active member of club or organization: Not on file     Attends meetings of clubs or organizations: Not on file     Relationship status: Not on file    Intimate partner violence     Fear of current or ex partner: Not on file     Emotionally abused: Not on file     Physically abused: Not on file     Forced sexual activity: Not on file   Other Topics Concern    Not on file   Social History Narrative    Not on file       Family History   Problem Relation Age of Onset    Stroke Mother     Cancer Father         colon    Heart Disease Father        Blood pressure (!) 126/59, pulse 88, temperature 97.6 °F (36.4 °C), temperature source Oral, resp. rate 16, height 5' 3\" (1.6 m), weight 124 lb 3 oz (56.3 kg), SpO2 98 %, not currently breastfeeding.   awake, alert  No distress now  Ears, nose, throat clear  No jvd  Heart distant s1,s2  Lungs decreased bs but clear  abd soft, good bs, no pain  No edema  A; acute chest pain, palpitations, sob  increased troponin  htn  ckd3  htn  Cardio eval, on heparin, diuresed last pm lightly

## 2020-11-09 ENCOUNTER — APPOINTMENT (OUTPATIENT)
Dept: ULTRASOUND IMAGING | Age: 75
DRG: 281 | End: 2020-11-09
Payer: MEDICARE

## 2020-11-09 ENCOUNTER — APPOINTMENT (OUTPATIENT)
Dept: NON INVASIVE DIAGNOSTICS | Age: 75
DRG: 281 | End: 2020-11-09
Payer: MEDICARE

## 2020-11-09 ENCOUNTER — APPOINTMENT (OUTPATIENT)
Dept: NUCLEAR MEDICINE | Age: 75
DRG: 281 | End: 2020-11-09
Payer: MEDICARE

## 2020-11-09 PROBLEM — N17.9 ACUTE KIDNEY INJURY (HCC): Status: ACTIVE | Noted: 2020-11-09

## 2020-11-09 PROBLEM — N18.32 STAGE 3B CHRONIC KIDNEY DISEASE (HCC): Status: ACTIVE | Noted: 2020-11-08

## 2020-11-09 LAB
ALBUMIN SERPL-MCNC: 4 G/DL (ref 3.5–5.2)
ALP BLD-CCNC: 47 U/L (ref 35–104)
ALT SERPL-CCNC: 11 U/L (ref 0–32)
ANION GAP SERPL CALCULATED.3IONS-SCNC: 13 MMOL/L (ref 7–16)
APTT: 27.2 SEC (ref 24.5–35.1)
APTT: 35.8 SEC (ref 24.5–35.1)
APTT: 80.8 SEC (ref 24.5–35.1)
AST SERPL-CCNC: 26 U/L (ref 0–31)
BILIRUB SERPL-MCNC: 0.2 MG/DL (ref 0–1.2)
BUN BLDV-MCNC: 40 MG/DL (ref 8–23)
CALCIUM SERPL-MCNC: 9.4 MG/DL (ref 8.6–10.2)
CHLORIDE BLD-SCNC: 96 MMOL/L (ref 98–107)
CHLORIDE URINE RANDOM: 21 MMOL/L
CO2: 23 MMOL/L (ref 22–29)
CREAT SERPL-MCNC: 2.4 MG/DL (ref 0.5–1)
CREATININE URINE: 56 MG/DL (ref 29–226)
EKG ATRIAL RATE: 101 BPM
EKG P AXIS: 64 DEGREES
EKG Q-T INTERVAL: 454 MS
EKG QRS DURATION: 94 MS
EKG QTC CALCULATION (BAZETT): 549 MS
EKG R AXIS: -24 DEGREES
EKG T AXIS: 19 DEGREES
EKG VENTRICULAR RATE: 88 BPM
GFR AFRICAN AMERICAN: 24
GFR NON-AFRICAN AMERICAN: 20 ML/MIN/1.73
GLUCOSE BLD-MCNC: 277 MG/DL (ref 74–99)
HCT VFR BLD CALC: 34.8 % (ref 34–48)
HEMOGLOBIN: 11 G/DL (ref 11.5–15.5)
LV EF: 38 %
LVEF MODALITY: NORMAL
MCH RBC QN AUTO: 27 PG (ref 26–35)
MCHC RBC AUTO-ENTMCNC: 31.6 % (ref 32–34.5)
MCV RBC AUTO: 85.5 FL (ref 80–99.9)
METER GLUCOSE: 202 MG/DL (ref 74–99)
METER GLUCOSE: 219 MG/DL (ref 74–99)
METER GLUCOSE: 234 MG/DL (ref 74–99)
METER GLUCOSE: 248 MG/DL (ref 74–99)
METER GLUCOSE: 295 MG/DL (ref 74–99)
OSMOLALITY URINE: 341 MOSM/KG (ref 300–900)
PDW BLD-RTO: 15.9 FL (ref 11.5–15)
PLATELET # BLD: 262 E9/L (ref 130–450)
PMV BLD AUTO: 8.7 FL (ref 7–12)
POTASSIUM SERPL-SCNC: 4.7 MMOL/L (ref 3.5–5)
PROTEIN PROTEIN: 8 MG/DL (ref 0–12)
PROTEIN/CREAT RATIO: 0.1
PROTEIN/CREAT RATIO: 0.1 (ref 0–0.2)
RBC # BLD: 4.07 E12/L (ref 3.5–5.5)
SODIUM BLD-SCNC: 132 MMOL/L (ref 132–146)
SODIUM URINE: 34 MMOL/L
TOTAL PROTEIN: 6.5 G/DL (ref 6.4–8.3)
TROPONIN: 0.11 NG/ML (ref 0–0.03)
WBC # BLD: 7.6 E9/L (ref 4.5–11.5)

## 2020-11-09 PROCEDURE — 82962 GLUCOSE BLOOD TEST: CPT

## 2020-11-09 PROCEDURE — 36415 COLL VENOUS BLD VENIPUNCTURE: CPT

## 2020-11-09 PROCEDURE — 78452 HT MUSCLE IMAGE SPECT MULT: CPT

## 2020-11-09 PROCEDURE — A9500 TC99M SESTAMIBI: HCPCS | Performed by: RADIOLOGY

## 2020-11-09 PROCEDURE — 76770 US EXAM ABDO BACK WALL COMP: CPT

## 2020-11-09 PROCEDURE — 6370000000 HC RX 637 (ALT 250 FOR IP): Performed by: INTERNAL MEDICINE

## 2020-11-09 PROCEDURE — 6370000000 HC RX 637 (ALT 250 FOR IP): Performed by: FAMILY MEDICINE

## 2020-11-09 PROCEDURE — 2580000003 HC RX 258: Performed by: INTERNAL MEDICINE

## 2020-11-09 PROCEDURE — 82436 ASSAY OF URINE CHLORIDE: CPT

## 2020-11-09 PROCEDURE — 3430000000 HC RX DIAGNOSTIC RADIOPHARMACEUTICAL: Performed by: RADIOLOGY

## 2020-11-09 PROCEDURE — 93017 CV STRESS TEST TRACING ONLY: CPT

## 2020-11-09 PROCEDURE — 6360000002 HC RX W HCPCS: Performed by: INTERNAL MEDICINE

## 2020-11-09 PROCEDURE — 85027 COMPLETE CBC AUTOMATED: CPT

## 2020-11-09 PROCEDURE — 2580000003 HC RX 258: Performed by: FAMILY MEDICINE

## 2020-11-09 PROCEDURE — 80053 COMPREHEN METABOLIC PANEL: CPT

## 2020-11-09 PROCEDURE — 6360000002 HC RX W HCPCS: Performed by: FAMILY MEDICINE

## 2020-11-09 PROCEDURE — 85730 THROMBOPLASTIN TIME PARTIAL: CPT

## 2020-11-09 PROCEDURE — 2060000000 HC ICU INTERMEDIATE R&B

## 2020-11-09 PROCEDURE — 84300 ASSAY OF URINE SODIUM: CPT

## 2020-11-09 PROCEDURE — 84156 ASSAY OF PROTEIN URINE: CPT

## 2020-11-09 PROCEDURE — 84484 ASSAY OF TROPONIN QUANT: CPT

## 2020-11-09 PROCEDURE — 83935 ASSAY OF URINE OSMOLALITY: CPT

## 2020-11-09 PROCEDURE — 82570 ASSAY OF URINE CREATININE: CPT

## 2020-11-09 RX ORDER — SODIUM CHLORIDE 9 MG/ML
INJECTION, SOLUTION INTRAVENOUS CONTINUOUS
Status: DISCONTINUED | OUTPATIENT
Start: 2020-11-09 | End: 2020-11-09

## 2020-11-09 RX ORDER — METOPROLOL SUCCINATE 25 MG/1
25 TABLET, EXTENDED RELEASE ORAL DAILY
Status: DISCONTINUED | OUTPATIENT
Start: 2020-11-09 | End: 2020-11-10 | Stop reason: HOSPADM

## 2020-11-09 RX ORDER — SODIUM CHLORIDE 9 MG/ML
INJECTION, SOLUTION INTRAVENOUS CONTINUOUS
Status: DISCONTINUED | OUTPATIENT
Start: 2020-11-09 | End: 2020-11-10

## 2020-11-09 RX ADMIN — HEPARIN SODIUM 3430 UNITS: 1000 INJECTION INTRAVENOUS; SUBCUTANEOUS at 04:35

## 2020-11-09 RX ADMIN — MULTIVITAMIN TABLET 1 TABLET: TABLET at 10:40

## 2020-11-09 RX ADMIN — INSULIN LISPRO 6 UNITS: 100 INJECTION, SOLUTION INTRAVENOUS; SUBCUTANEOUS at 10:51

## 2020-11-09 RX ADMIN — Medication 10 MILLICURIE: at 08:15

## 2020-11-09 RX ADMIN — MONTELUKAST SODIUM 10 MG: 10 TABLET, FILM COATED ORAL at 19:57

## 2020-11-09 RX ADMIN — METOPROLOL SUCCINATE 25 MG: 25 TABLET, EXTENDED RELEASE ORAL at 10:40

## 2020-11-09 RX ADMIN — GLIMEPIRIDE 4 MG: 4 TABLET ORAL at 10:40

## 2020-11-09 RX ADMIN — TICAGRELOR 90 MG: 90 TABLET ORAL at 10:40

## 2020-11-09 RX ADMIN — TICAGRELOR 90 MG: 90 TABLET ORAL at 19:57

## 2020-11-09 RX ADMIN — HEPARIN SODIUM 3430 UNITS: 1000 INJECTION INTRAVENOUS; SUBCUTANEOUS at 19:59

## 2020-11-09 RX ADMIN — SODIUM CHLORIDE, PRESERVATIVE FREE 10 ML: 5 INJECTION INTRAVENOUS at 20:08

## 2020-11-09 RX ADMIN — Medication 2000 UNITS: at 10:40

## 2020-11-09 RX ADMIN — HEPARIN SODIUM 20 UNITS/KG/HR: 10000 INJECTION, SOLUTION INTRAVENOUS at 19:59

## 2020-11-09 RX ADMIN — Medication 30 MILLICURIE: at 08:15

## 2020-11-09 RX ADMIN — REGADENOSON 0.4 MG: 0.08 INJECTION, SOLUTION INTRAVENOUS at 08:45

## 2020-11-09 RX ADMIN — INSULIN LISPRO 4 UNITS: 100 INJECTION, SOLUTION INTRAVENOUS; SUBCUTANEOUS at 17:42

## 2020-11-09 RX ADMIN — SODIUM CHLORIDE: 9 INJECTION, SOLUTION INTRAVENOUS at 19:56

## 2020-11-09 RX ADMIN — GLIMEPIRIDE 4 MG: 4 TABLET ORAL at 17:41

## 2020-11-09 RX ADMIN — INSULIN LISPRO 2 UNITS: 100 INJECTION, SOLUTION INTRAVENOUS; SUBCUTANEOUS at 19:59

## 2020-11-09 RX ADMIN — Medication 400 MG: at 10:40

## 2020-11-09 ASSESSMENT — PAIN SCALES - GENERAL
PAINLEVEL_OUTOF10: 0
PAINLEVEL_OUTOF10: 0

## 2020-11-09 NOTE — PLAN OF CARE
Nuclear results noted. IVF starting 8 PM 11/9 per Dr. Britney Vásquez. Plan to discuss heart catheterization with her tomorrow 11/10. NPO after midnight except meds in case she is transferred to [facility] for cath.   Dr. Sam Fonseca

## 2020-11-09 NOTE — CONSULTS
[facility] MD Nathan Claire MD Concha Leader, MD Italo Ndiaye MD  Lauren Pole, CNP   Flora Addie, ANP  Consultation  11/9/2020    Thank you for consult  Full note dictated, to follow  Briefly, 77 y. o. woman known to service, followed longitudinally from nephrology standpoint by Dr. Latanya Centeno. Admitted status post non-STEMI. Echocardiogram demonstrates wall motion abnormalities consistent with ischemia. Stress test performed this morning pending. Likely will need coronary artery catheterization in the near future    A/R:  1. Acute kidney injury, hemodynamically mediated, mild     2. Chronic kidney disease, stage IIIb, baseline creatinine 1.8-2 mg/dL    3. Anemia due to chronic kidney disease    4. Coronary artery disease status post non-STEMI    Check urine sodium, chloride, creatinine   check random urine protein creatinine ratio  Renal ultrasound  Given baseline serum creatinine she would be considered \"moderate\" risk for developing acute renal failure post contrast administration  Prophylaxis to prevent contrast-induced nephropathy would include IV normal saline at 1 cc/kg/h for 12 hours before, and continue for 12 hours after the procedure, minimization of contrast volume, ideally < 70 cc.   Will discuss with cardiology  Follow labs, UO  Avoid nephrotoxins as able      Nathan Leonard MD     Addendum  Discussed with Dr. Rosa Maria Zavala re: IV normal saline drip pre and post cath  MD BRAD

## 2020-11-09 NOTE — PROGRESS NOTES
7:40 AM  Consult placed to Dr. Graciela Thomas (on call) via [employer] text.   Tammi Liu [facility]/HORTENSIA  11/9/2020  Read @ 7:55 AM

## 2020-11-09 NOTE — PROGRESS NOTES
Admit Date: 11/7/2020    Subjective:     Patient states she has not felt the chest pressure or shortness of breath since early yesterday. She has no nausea. Scheduled Meds:   ticagrelor  90 mg Oral BID    metoprolol succinate  25 mg Oral Daily    insulin lispro  0-12 Units Subcutaneous TID WC    insulin lispro  0-6 Units Subcutaneous Nightly    Vitamin D  2,000 Units Oral Daily    glimepiride  4 mg Oral BID WC    magnesium oxide  400 mg Oral Daily    montelukast  10 mg Oral Nightly    multivitamin  1 tablet Oral Daily    sodium chloride flush  10 mL Intravenous 2 times per day    Arformoterol Tartrate  15 mcg Nebulization BID    And    budesonide  0.5 mg Nebulization BID     Continuous Infusions:   dextrose      heparin (PORCINE) Infusion 18 Units/kg/hr (11/09/20 0434)     PRN Meds:regadenoson, glucose, dextrose, glucagon (rDNA), dextrose, perflutren lipid microspheres, sodium chloride flush, acetaminophen **OR** acetaminophen, polyethylene glycol, heparin (porcine), heparin (porcine), nitroGLYCERIN, trimethobenzamide      Objective:     I/O last 3 completed shifts: In: 546.5 [P.O.:420; I.V.:126.5]  Out: 800 [Urine:800]  No intake/output data recorded.   /65   Pulse 78   Temp 98.3 °F (36.8 °C) (Oral)   Resp 18   Ht 5' 3\" (1.6 m)   Wt 128 lb 3 oz (58.1 kg)   SpO2 96%   BMI 22.71 kg/m²     LUNGS:  No increased work of breathing, good air exchange, clear to auscultation bilaterally, no crackles or wheezing  CARDIOVASCULAR:  RRR with no murmurs, no gallops, no rubs  ABDOMEN:  Flat, soft, non-tender no HSM no masses  MUSCULOSKELETAL:  No cyanosis, no edema, no clubbing    Data Review  CBC:   Recent Labs     11/07/20  1405 11/08/20  0300 11/09/20  0338   WBC 7.8 6.6 7.6   HGB 11.3* 10.5* 11.0*    197 262     BMP:    Recent Labs     11/07/20  1004 11/08/20  0300   * 132   K 4.8 4.3   CL 97* 99   CO2 23 22   BUN 23 34*   CREATININE 2.1* 2.3*   GLUCOSE 241* 186*     Coagulation: Lab Results   Component Value Date    INR 2.7 01/20/2018    APTT 35.8 11/09/2020     Cardiac markers: No results found for: CKMB, TROPONINT, MYOGLOBIN  Lab Results   Component Value Date    LABALBU 4.2 11/07/2020     Lab Results   Component Value Date    ALT 11 11/07/2020    AST 26 11/07/2020    ALKPHOS 52 11/07/2020    BILITOT 0.3 11/07/2020     Echocardiogram with impaired motion of earlene-inferior aspect of left ventricle    Assessment:     Patient Active Problem List    Diagnosis Date Noted    Non-ST elevation MI (NSTEMI) ([facility] 11/08/2020     Priority: High    Mitral regurgitation 11/08/2020     Priority: High    Elevated troponin 11/07/2020     Priority: High    Type 2 diabetes mellitus ([facility] 11/08/2020     Priority: Medium    CKD (chronic kidney disease) stage 4, GFR 15-29 ml/min ([facility] 11/08/2020     Priority: Medium    Essential hypertension 11/08/2020     Priority: Low    Colon polyps 02/20/2014     Priority: Low       Plan:     Agree with need for cardiac catheterization. Consult with Dr. Nicci Castro or associate regarding protection of renal status and management of CKD.

## 2020-11-09 NOTE — PROGRESS NOTES
DAILY PROGRESS NOTE - THE HEART CENTER    SUBJECTIVE:    She is being followed for unstable anginal symptoms and elevated troponin, likely non-STEMI. In no distress at this time with no further chest discomfort for the last a little over 2 days. On heparin infusion. Echo showed LVEF 35 to 40% with mid-distal anteroseptal hypo to akinesis, which may indicate her infarct took place in the LAD territory. Indeterminate diastolic function with trace valvular regurgitation. Hypertension, hyperlipidemia, non-insulin-requiring diabetes mellitus, stage III-IV chronic kidney disease. Creatinine a little worse at 2.3, up from 2.1. OBJECTIVE:    Her vital signs were reviewed today. Vitals:    11/08/20 2350   BP: 108/65   Pulse: 78   Resp: 18   Temp: 98.3 °F (36.8 °C)   SpO2: 96%       Scheduled Meds:   clopidogrel  75 mg Oral Daily    Vitamin D  2,000 Units Oral Daily    glimepiride  4 mg Oral BID WC    magnesium oxide  400 mg Oral Daily    montelukast  10 mg Oral Nightly    multivitamin  1 tablet Oral Daily    sodium chloride flush  10 mL Intravenous 2 times per day    insulin lispro  0-6 Units Subcutaneous TID WC    insulin lispro  0-3 Units Subcutaneous Nightly    Arformoterol Tartrate  15 mcg Nebulization BID    And    budesonide  0.5 mg Nebulization BID    losartan  100 mg Oral Daily     Continuous Infusions:   dextrose      heparin (PORCINE) Infusion 18 Units/kg/hr (11/09/20 0434)     PRN Meds:.regadenoson, glucose, dextrose, glucagon (rDNA), dextrose, perflutren lipid microspheres, sodium chloride flush, acetaminophen **OR** acetaminophen, polyethylene glycol, heparin (porcine), heparin (porcine), nitroGLYCERIN, trimethobenzamide    REVIEW OF SYSTEMS:     No pruritus, rash, bruising. Cardiac symptoms per HPI. No nausea, vomiting, abdominal pain, GI bleeding, change in bowel habits. No dysuria, urinary frequency, urgency, hematuria, flank pain.   Joint pain but no muscle soreness,

## 2020-11-10 ENCOUNTER — HOSPITAL ENCOUNTER (INPATIENT)
Age: 75
LOS: 2 days | Discharge: HOME | DRG: 247 | End: 2020-11-12
Attending: FAMILY MEDICINE | Admitting: FAMILY MEDICINE
Payer: MEDICARE

## 2020-11-10 VITALS
WEIGHT: 128.2 LBS | HEIGHT: 63 IN | TEMPERATURE: 98.3 F | BODY MASS INDEX: 22.71 KG/M2 | HEART RATE: 77 BPM | RESPIRATION RATE: 16 BRPM | SYSTOLIC BLOOD PRESSURE: 118 MMHG | DIASTOLIC BLOOD PRESSURE: 59 MMHG | OXYGEN SATURATION: 100 %

## 2020-11-10 PROBLEM — I21.4 NSTEMI (NON-ST ELEVATED MYOCARDIAL INFARCTION) (HCC): Status: ACTIVE | Noted: 2020-11-10

## 2020-11-10 LAB
ANION GAP SERPL CALCULATED.3IONS-SCNC: 14 MMOL/L (ref 7–16)
APTT: 129.4 SEC (ref 24.5–35.1)
APTT: 48.9 SEC (ref 24.5–35.1)
APTT: 69.3 SEC (ref 24.5–35.1)
BUN BLDV-MCNC: 41 MG/DL (ref 8–23)
CALCIUM SERPL-MCNC: 8.4 MG/DL (ref 8.6–10.2)
CHLORIDE BLD-SCNC: 99 MMOL/L (ref 98–107)
CHOLESTEROL, TOTAL: 221 MG/DL (ref 0–199)
CO2: 20 MMOL/L (ref 22–29)
CREAT SERPL-MCNC: 2.3 MG/DL (ref 0.5–1)
EKG ATRIAL RATE: 89 BPM
EKG P AXIS: 14 DEGREES
EKG P-R INTERVAL: 208 MS
EKG Q-T INTERVAL: 406 MS
EKG QRS DURATION: 96 MS
EKG QTC CALCULATION (BAZETT): 493 MS
EKG R AXIS: -27 DEGREES
EKG T AXIS: -166 DEGREES
EKG VENTRICULAR RATE: 89 BPM
GFR AFRICAN AMERICAN: 25
GFR NON-AFRICAN AMERICAN: 21 ML/MIN/1.73
GLUCOSE BLD-MCNC: 458 MG/DL (ref 74–99)
HDLC SERPL-MCNC: 38 MG/DL
LDL CHOLESTEROL CALCULATED: 106 MG/DL (ref 0–99)
MAGNESIUM: 2 MG/DL (ref 1.6–2.6)
METER GLUCOSE: 198 MG/DL (ref 74–99)
METER GLUCOSE: 199 MG/DL (ref 74–99)
METER GLUCOSE: 218 MG/DL (ref 74–99)
METER GLUCOSE: 318 MG/DL (ref 74–99)
METER GLUCOSE: 458 MG/DL (ref 74–99)
METER GLUCOSE: 57 MG/DL (ref 74–99)
METER GLUCOSE: 68 MG/DL (ref 74–99)
METER GLUCOSE: 75 MG/DL (ref 74–99)
PHOSPHORUS: 4.8 MG/DL (ref 2.5–4.5)
POTASSIUM SERPL-SCNC: 4.1 MMOL/L (ref 3.5–5)
SODIUM BLD-SCNC: 133 MMOL/L (ref 132–146)
TRIGL SERPL-MCNC: 387 MG/DL (ref 0–149)
VLDLC SERPL CALC-MCNC: 77 MG/DL

## 2020-11-10 PROCEDURE — 36415 COLL VENOUS BLD VENIPUNCTURE: CPT

## 2020-11-10 PROCEDURE — 82962 GLUCOSE BLOOD TEST: CPT

## 2020-11-10 PROCEDURE — 85730 THROMBOPLASTIN TIME PARTIAL: CPT

## 2020-11-10 PROCEDURE — 80048 BASIC METABOLIC PNL TOTAL CA: CPT

## 2020-11-10 PROCEDURE — 6370000000 HC RX 637 (ALT 250 FOR IP): Performed by: FAMILY MEDICINE

## 2020-11-10 PROCEDURE — 80061 LIPID PANEL: CPT

## 2020-11-10 PROCEDURE — 2580000003 HC RX 258: Performed by: FAMILY MEDICINE

## 2020-11-10 PROCEDURE — 2140000000 HC CCU INTERMEDIATE R&B

## 2020-11-10 PROCEDURE — 6370000000 HC RX 637 (ALT 250 FOR IP): Performed by: INTERNAL MEDICINE

## 2020-11-10 PROCEDURE — 6360000002 HC RX W HCPCS: Performed by: FAMILY MEDICINE

## 2020-11-10 PROCEDURE — 2580000003 HC RX 258: Performed by: INTERNAL MEDICINE

## 2020-11-10 PROCEDURE — 83735 ASSAY OF MAGNESIUM: CPT

## 2020-11-10 PROCEDURE — 84100 ASSAY OF PHOSPHORUS: CPT

## 2020-11-10 RX ORDER — MULTIVITAMIN WITH IRON
1 TABLET ORAL DAILY
Status: DISCONTINUED | OUTPATIENT
Start: 2020-11-11 | End: 2020-11-12 | Stop reason: HOSPADM

## 2020-11-10 RX ORDER — NITROGLYCERIN 0.4 MG/1
0.4 TABLET SUBLINGUAL EVERY 5 MIN PRN
Status: DISCONTINUED | OUTPATIENT
Start: 2020-11-10 | End: 2020-11-12 | Stop reason: HOSPADM

## 2020-11-10 RX ORDER — POLYETHYLENE GLYCOL 3350 17 G/17G
17 POWDER, FOR SOLUTION ORAL DAILY PRN
Status: DISCONTINUED | OUTPATIENT
Start: 2020-11-10 | End: 2020-11-12 | Stop reason: HOSPADM

## 2020-11-10 RX ORDER — ARFORMOTEROL TARTRATE 15 UG/2ML
15 SOLUTION RESPIRATORY (INHALATION) 2 TIMES DAILY
Status: DISCONTINUED | OUTPATIENT
Start: 2020-11-10 | End: 2020-11-12 | Stop reason: HOSPADM

## 2020-11-10 RX ORDER — DEXTROSE MONOHYDRATE 25 G/50ML
12.5 INJECTION, SOLUTION INTRAVENOUS PRN
Status: DISCONTINUED | OUTPATIENT
Start: 2020-11-10 | End: 2020-11-12 | Stop reason: HOSPADM

## 2020-11-10 RX ORDER — HEPARIN SODIUM 1000 [USP'U]/ML
30 INJECTION, SOLUTION INTRAVENOUS; SUBCUTANEOUS PRN
Status: DISCONTINUED | OUTPATIENT
Start: 2020-11-10 | End: 2020-11-12 | Stop reason: HOSPADM

## 2020-11-10 RX ORDER — HEPARIN SODIUM 10000 [USP'U]/100ML
12 INJECTION, SOLUTION INTRAVENOUS CONTINUOUS
Status: DISCONTINUED | OUTPATIENT
Start: 2020-11-10 | End: 2020-11-12 | Stop reason: HOSPADM

## 2020-11-10 RX ORDER — METOPROLOL SUCCINATE 25 MG/1
25 TABLET, EXTENDED RELEASE ORAL DAILY
Status: DISCONTINUED | OUTPATIENT
Start: 2020-11-11 | End: 2020-11-12 | Stop reason: HOSPADM

## 2020-11-10 RX ORDER — SODIUM CHLORIDE 9 MG/ML
INJECTION, SOLUTION INTRAVENOUS CONTINUOUS
Status: DISCONTINUED | OUTPATIENT
Start: 2020-11-10 | End: 2020-11-10 | Stop reason: HOSPADM

## 2020-11-10 RX ORDER — MULTIVITAMIN WITH IRON
1 TABLET ORAL DAILY
Status: CANCELLED | OUTPATIENT
Start: 2020-11-11

## 2020-11-10 RX ORDER — NICOTINE POLACRILEX 4 MG
15 LOZENGE BUCCAL PRN
Status: DISCONTINUED | OUTPATIENT
Start: 2020-11-10 | End: 2020-11-12 | Stop reason: HOSPADM

## 2020-11-10 RX ORDER — MONTELUKAST SODIUM 10 MG/1
10 TABLET ORAL NIGHTLY
Status: CANCELLED | OUTPATIENT
Start: 2020-11-10

## 2020-11-10 RX ORDER — HEPARIN SODIUM 1000 [USP'U]/ML
60 INJECTION, SOLUTION INTRAVENOUS; SUBCUTANEOUS PRN
Status: CANCELLED | OUTPATIENT
Start: 2020-11-10

## 2020-11-10 RX ORDER — ARFORMOTEROL TARTRATE 15 UG/2ML
15 SOLUTION RESPIRATORY (INHALATION) 2 TIMES DAILY
Status: CANCELLED | OUTPATIENT
Start: 2020-11-10

## 2020-11-10 RX ORDER — NITROGLYCERIN 0.4 MG/1
0.4 TABLET SUBLINGUAL EVERY 5 MIN PRN
Status: CANCELLED | OUTPATIENT
Start: 2020-11-10

## 2020-11-10 RX ORDER — POLYETHYLENE GLYCOL 3350 17 G/17G
17 POWDER, FOR SOLUTION ORAL DAILY PRN
Status: CANCELLED | OUTPATIENT
Start: 2020-11-10

## 2020-11-10 RX ORDER — HEPARIN SODIUM 10000 [USP'U]/100ML
12 INJECTION, SOLUTION INTRAVENOUS CONTINUOUS
Status: CANCELLED | OUTPATIENT
Start: 2020-11-10

## 2020-11-10 RX ORDER — SODIUM CHLORIDE 9 MG/ML
INJECTION, SOLUTION INTRAVENOUS CONTINUOUS
Status: CANCELLED | OUTPATIENT
Start: 2020-11-10 | End: 2020-11-10

## 2020-11-10 RX ORDER — ACETAMINOPHEN 325 MG/1
650 TABLET ORAL EVERY 6 HOURS PRN
Status: DISCONTINUED | OUTPATIENT
Start: 2020-11-10 | End: 2020-11-12 | Stop reason: HOSPADM

## 2020-11-10 RX ORDER — ACETAMINOPHEN 650 MG/1
650 SUPPOSITORY RECTAL EVERY 6 HOURS PRN
Status: DISCONTINUED | OUTPATIENT
Start: 2020-11-10 | End: 2020-11-12 | Stop reason: HOSPADM

## 2020-11-10 RX ORDER — DEXTROSE MONOHYDRATE 50 MG/ML
100 INJECTION, SOLUTION INTRAVENOUS PRN
Status: DISCONTINUED | OUTPATIENT
Start: 2020-11-10 | End: 2020-11-12 | Stop reason: HOSPADM

## 2020-11-10 RX ORDER — METOPROLOL SUCCINATE 25 MG/1
25 TABLET, EXTENDED RELEASE ORAL DAILY
Status: CANCELLED | OUTPATIENT
Start: 2020-11-11

## 2020-11-10 RX ORDER — BUDESONIDE 0.5 MG/2ML
0.5 INHALANT ORAL 2 TIMES DAILY
Status: CANCELLED | OUTPATIENT
Start: 2020-11-10

## 2020-11-10 RX ORDER — SODIUM CHLORIDE 0.9 % (FLUSH) 0.9 %
10 SYRINGE (ML) INJECTION EVERY 12 HOURS SCHEDULED
Status: DISCONTINUED | OUTPATIENT
Start: 2020-11-10 | End: 2020-11-12 | Stop reason: HOSPADM

## 2020-11-10 RX ORDER — VITAMIN B COMPLEX
2000 TABLET ORAL DAILY
Status: DISCONTINUED | OUTPATIENT
Start: 2020-11-11 | End: 2020-11-12 | Stop reason: HOSPADM

## 2020-11-10 RX ORDER — HEPARIN SODIUM 1000 [USP'U]/ML
30 INJECTION, SOLUTION INTRAVENOUS; SUBCUTANEOUS PRN
Status: CANCELLED | OUTPATIENT
Start: 2020-11-10

## 2020-11-10 RX ORDER — GLIMEPIRIDE 4 MG/1
4 TABLET ORAL 2 TIMES DAILY WITH MEALS
Status: DISCONTINUED | OUTPATIENT
Start: 2020-11-11 | End: 2020-11-12 | Stop reason: HOSPADM

## 2020-11-10 RX ORDER — DEXTROSE MONOHYDRATE 50 MG/ML
100 INJECTION, SOLUTION INTRAVENOUS PRN
Status: CANCELLED | OUTPATIENT
Start: 2020-11-10

## 2020-11-10 RX ORDER — FENOFIBRATE 160 MG/1
160 TABLET ORAL DAILY
Status: DISCONTINUED | OUTPATIENT
Start: 2020-11-10 | End: 2020-11-12 | Stop reason: HOSPADM

## 2020-11-10 RX ORDER — SODIUM CHLORIDE 0.9 % (FLUSH) 0.9 %
10 SYRINGE (ML) INJECTION PRN
Status: DISCONTINUED | OUTPATIENT
Start: 2020-11-10 | End: 2020-11-12 | Stop reason: HOSPADM

## 2020-11-10 RX ORDER — VITAMIN B COMPLEX
2000 TABLET ORAL DAILY
Status: CANCELLED | OUTPATIENT
Start: 2020-11-11

## 2020-11-10 RX ORDER — HEPARIN SODIUM 1000 [USP'U]/ML
60 INJECTION, SOLUTION INTRAVENOUS; SUBCUTANEOUS PRN
Status: DISCONTINUED | OUTPATIENT
Start: 2020-11-10 | End: 2020-11-12 | Stop reason: HOSPADM

## 2020-11-10 RX ORDER — GLIMEPIRIDE 4 MG/1
4 TABLET ORAL 2 TIMES DAILY WITH MEALS
Status: CANCELLED | OUTPATIENT
Start: 2020-11-10

## 2020-11-10 RX ORDER — FENOFIBRATE 160 MG/1
160 TABLET ORAL DAILY
Status: CANCELLED | OUTPATIENT
Start: 2020-11-10

## 2020-11-10 RX ORDER — ACETAMINOPHEN 325 MG/1
650 TABLET ORAL EVERY 6 HOURS PRN
Status: CANCELLED | OUTPATIENT
Start: 2020-11-10

## 2020-11-10 RX ORDER — SODIUM CHLORIDE 0.9 % (FLUSH) 0.9 %
10 SYRINGE (ML) INJECTION EVERY 12 HOURS SCHEDULED
Status: CANCELLED | OUTPATIENT
Start: 2020-11-10

## 2020-11-10 RX ORDER — BUDESONIDE 0.5 MG/2ML
0.5 INHALANT ORAL 2 TIMES DAILY
Status: DISCONTINUED | OUTPATIENT
Start: 2020-11-10 | End: 2020-11-12 | Stop reason: HOSPADM

## 2020-11-10 RX ORDER — SODIUM CHLORIDE 0.9 % (FLUSH) 0.9 %
10 SYRINGE (ML) INJECTION PRN
Status: CANCELLED | OUTPATIENT
Start: 2020-11-10

## 2020-11-10 RX ORDER — ACETAMINOPHEN 650 MG/1
650 SUPPOSITORY RECTAL EVERY 6 HOURS PRN
Status: CANCELLED | OUTPATIENT
Start: 2020-11-10

## 2020-11-10 RX ORDER — NICOTINE POLACRILEX 4 MG
15 LOZENGE BUCCAL PRN
Status: CANCELLED | OUTPATIENT
Start: 2020-11-10

## 2020-11-10 RX ORDER — DEXTROSE MONOHYDRATE 25 G/50ML
12.5 INJECTION, SOLUTION INTRAVENOUS PRN
Status: CANCELLED | OUTPATIENT
Start: 2020-11-10

## 2020-11-10 RX ORDER — SODIUM CHLORIDE 9 MG/ML
INJECTION, SOLUTION INTRAVENOUS CONTINUOUS
Status: DISCONTINUED | OUTPATIENT
Start: 2020-11-10 | End: 2020-11-11

## 2020-11-10 RX ORDER — MONTELUKAST SODIUM 10 MG/1
10 TABLET ORAL NIGHTLY
Status: DISCONTINUED | OUTPATIENT
Start: 2020-11-10 | End: 2020-11-12 | Stop reason: HOSPADM

## 2020-11-10 RX ADMIN — HEPARIN SODIUM 20 UNITS/KG/HR: 10000 INJECTION, SOLUTION INTRAVENOUS at 01:31

## 2020-11-10 RX ADMIN — GLIMEPIRIDE 4 MG: 4 TABLET ORAL at 16:41

## 2020-11-10 RX ADMIN — GLIMEPIRIDE 4 MG: 4 TABLET ORAL at 10:01

## 2020-11-10 RX ADMIN — SODIUM CHLORIDE, PRESERVATIVE FREE 10 ML: 5 INJECTION INTRAVENOUS at 10:01

## 2020-11-10 RX ADMIN — TICAGRELOR 90 MG: 90 TABLET ORAL at 21:33

## 2020-11-10 RX ADMIN — SODIUM CHLORIDE: 9 INJECTION, SOLUTION INTRAVENOUS at 13:22

## 2020-11-10 RX ADMIN — HEPARIN SODIUM 1710 UNITS: 1000 INJECTION INTRAVENOUS; SUBCUTANEOUS at 13:16

## 2020-11-10 RX ADMIN — INSULIN LISPRO 18 UNITS: 100 INJECTION, SOLUTION INTRAVENOUS; SUBCUTANEOUS at 11:07

## 2020-11-10 RX ADMIN — MONTELUKAST SODIUM 10 MG: 10 TABLET ORAL at 21:33

## 2020-11-10 RX ADMIN — TICAGRELOR 90 MG: 90 TABLET ORAL at 10:01

## 2020-11-10 RX ADMIN — Medication 400 MG: at 10:01

## 2020-11-10 RX ADMIN — Medication 2000 UNITS: at 10:01

## 2020-11-10 RX ADMIN — METOPROLOL SUCCINATE 25 MG: 25 TABLET, EXTENDED RELEASE ORAL at 10:01

## 2020-11-10 RX ADMIN — MULTIVITAMIN TABLET 1 TABLET: TABLET at 10:01

## 2020-11-10 RX ADMIN — INSULIN LISPRO 2 UNITS: 100 INJECTION, SOLUTION INTRAVENOUS; SUBCUTANEOUS at 21:33

## 2020-11-10 ASSESSMENT — PAIN SCALES - GENERAL
PAINLEVEL_OUTOF10: 0

## 2020-11-10 NOTE — PROGRESS NOTES
bowel habits. No dysuria, urinary frequency, urgency, hematuria, flank pain. Joint pain but no muscle soreness, stiffness, aching. No headache, speech disturbance, lateralizing neurologic deficit. No hemoptysis, epistaxis, easy bruising. No anxiety, depression. No symptoms of hypothyroidism, hyperthyroidism, diabetes, heat or cold intolerance. FAMILY HISTORY: Negative for CAD in first-degree relatives. SOCIAL HISTORY: Negative for alcohol, tobacco, or illicit drug use. PHYSICAL EXAM:    General Appearance:  awake, alert, oriented, in no acute distress  Neck:  no bruits  Lungs:  Normal expansion. Clear to auscultation. No rales, rhonchi, or wheezing. Heart:  Heart sounds are normal.  Regular rate and rhythm without murmur, gallop or rub. Abdomen:  Soft, non-tender. Extremities: Extremities warm to touch, pink, with no edema. Neuro/musculosketal:  Unremarkable. LABS:    Recent Labs     11/07/20  1004 11/08/20  0300 11/09/20  1110   * 132 132   CREATININE 2.1* 2.3* 2.4*       Recent Labs     11/07/20  1405 11/08/20  0300 11/09/20  0338   HGB 11.3* 10.5* 11.0*       No results for input(s): INR in the last 72 hours. IMPRESSION:    #1.  Chest pain and non-STEMI-even though Lexiscan showed no ischemia, it did show CAD in multiple territories with decreased LVEF. She had non-STEMI by symptoms and laboratory values, and I recommend heart catheterization. Plan transfer to [facility] for heart catheterization with potential intervention as or if appropriate. Heart catheterization to be done hopefully later on today. Continue IV fluids and maintain n.p.o. status at this time except for medication. #2.  Stage III chronic kidney disease-creatinine somewhat worse. Appreciate Dr. Fozia Smith help and have continued IV fluid at this time. To be continued for 12 hours after the heart catheterization. Lungs are clear and she is not fluid overloaded. #3.   Antiplatelet therapy-she is allergic to aspirin which causes hives (true allergy). Have continued Brilinta pending heart catheterization as research states that heart catheterization and PCI can be done safely utilizing only Brilinta. #4.  Ischemic cardiomyopathy-LVEF 35 to 40%. Continue metoprolol succinate for cardioprotection. Hold ONLY if systolic pressure drops below 90. #5. Lipid status-not currently on a statin because she claims severe intolerance to statins. Await lipid profile which I ordered yesterday morning. May require Zetia or PCSK9 inhibitors. #6.  Diabetes-per hospitalist.    #7.  Continue to follow.

## 2020-11-10 NOTE — PROGRESS NOTES
[facility] MD Chandrika Rendon, MD Rashaun Anderson, MD Berenice Castillo, CNP   Shi Burrell, JANE  Progress Note    11/10/2020    SUBJECTIVE:   11/10: Feeling better. No dyspnea at rest, though does have easy fatigability and some mild dyspnea with ambulating in the hallway. Denies cp/palp. No nausea or vomiting. No cough wheeze or sputum production. Appetite ok    PROBLEM LIST:    Principal Problem:    Non-ST elevation MI (NSTEMI) ([facility] [address]  Active Problems:    Mitral regurgitation    Acute kidney injury ([facility] [address]    Type 2 diabetes mellitus (HCC)    Stage 3b chronic kidney disease    Colon polyps    Elevated troponin    Essential hypertension  Resolved Problems:    * No resolved hospital problems.  *         DIET:    DIET CARB CONTROL; Carb Control: 4 carbs/meal (approximate 1800 kcals/day)     MEDS (scheduled):    insulin lispro  0-18 Units Subcutaneous TID WC    insulin lispro  0-9 Units Subcutaneous Nightly    ticagrelor  90 mg Oral BID    metoprolol succinate  25 mg Oral Daily    Vitamin D  2,000 Units Oral Daily    glimepiride  4 mg Oral BID WC    magnesium oxide  400 mg Oral Daily    montelukast  10 mg Oral Nightly    multivitamin  1 tablet Oral Daily    sodium chloride flush  10 mL Intravenous 2 times per day    Arformoterol Tartrate  15 mcg Nebulization BID    And    budesonide  0.5 mg Nebulization BID       MEDS (infusions):   sodium chloride 60 mL/hr at 11/09/20 1956    dextrose      heparin (PORCINE) Infusion 17 Units/kg/hr (11/10/20 0328)       MEDS (prn):  glucose, dextrose, glucagon (rDNA), dextrose, perflutren lipid microspheres, sodium chloride flush, acetaminophen **OR** acetaminophen, polyethylene glycol, heparin (porcine), heparin (porcine), nitroGLYCERIN, trimethobenzamide    PHYSICAL EXAM:     Patient Vitals for the past 24 hrs:   BP Temp Temp src Pulse Resp SpO2 Weight   11/10/20 0805 125/60 98.4 °F (36.9 °C) Oral 83 16 97 % --   11/10/20 0115 134/70 98.4 °F (36.9 °C) Oral 80 18 96 % --   11/10/20 0105 -- -- -- -- -- -- 128 lb 3.2 oz (58.2 kg)   11/09/20 1945 137/68 98.4 °F (36.9 °C) Oral 86 18 97 % --   11/09/20 1730 (!) 144/82 98.2 °F (36.8 °C) Oral 90 18 96 % --   @      Intake/Output Summary (Last 24 hours) at 11/10/2020 1311  Last data filed at 11/10/2020 1232  Gross per 24 hour   Intake 360 ml   Output 250 ml   Net 110 ml         Wt Readings from Last 3 Encounters:   11/10/20 128 lb 3.2 oz (58.2 kg)   12/29/16 133 lb 14.4 oz (60.7 kg)   10/14/15 135 lb 9.6 oz (61.5 kg)       Constitutional:  in no acute distress  HEENT: NC/AT, EOMI, sclera and conjunctiva are clear and anicteric, mucus membranes moist  Neck: Trachea midline, no JVD  Cardiovascular: S1, S2 regular rhythm, no murmur,or rub  Respiratory:  No crackles, no wheeze  Gastrointestinal:  Soft, nontender, nondistended, NABS  Ext: no edema, feet warm  Skin: dry, no rash  Neuro: awake, alert, interactive      DATA:    Recent Labs     11/07/20  1405 11/08/20  0300 11/09/20  0338   WBC 7.8 6.6 7.6   HGB 11.3* 10.5* 11.0*   HCT 37.5 34.0 34.8   MCV 87.6 85.6 85.5    197 262     Recent Labs     11/08/20  0300 11/09/20  1110 11/10/20  1010    132 133   K 4.3 4.7 4.1   CL 99 96* 99   CO2 22 23 20*   MG  --   --  2.0   PHOS  --   --  4.8*   BUN 34* 40* 41*   CREATININE 2.3* 2.4* 2.3*   ALT  --  11  --    AST  --  26  --    BILITOT  --  0.2  --    ALKPHOS  --  47  --        Lab Results   Component Value Date    LABPROT 0.1 11/09/2020    LABPROT 0.1 11/09/2020       ASSESSMENT / RECOMMENDATIONS:    77 y. o. woman known to service, followed longitudinally from nephrology standpoint by Dr. Morena Morrow. Admitted status post non-STEMI. Echocardiogram demonstrates wall motion abnormalities consistent with ischemia. Stress test results (11/9) noted. For coronary artery catheterization in the near future     1. Acute kidney injury, hemodynamically mediated, mild      2. Chronic kidney disease, stage IIIb, baseline creatinine 1.8-2 mg/dL     3. Anemia due to chronic kidney disease     4.   Coronary artery disease status post non-STEMI     Urinary indices are consistent with prerenal azotemia  Renal ultrasound is notable for advanced bilateral renal cortical thinning, a few cysts, though no hydronephrosis      --> Given baseline serum creatinine she would be considered \"moderate\" risk for developing acute renal failure post contrast administration  --> Prophylaxis to prevent contrast-induced nephropathy would include IV normal saline at 1 cc/kg/h for 12 hours before, and continue for 12 hours after the procedure,   --> minimization of contrast volume at cath, ideally < 70 cc.    --> Continue IV normal saline at 1 cc/kg/h for now  --> Encourage oral intake  --> Follow labs, UO  --> Avoid nephrotoxins    discussed with cardiology        Electronically signed by Lobo Dutton MD on 11/10/2020 at 1:11 PM

## 2020-11-10 NOTE — PROGRESS NOTES
Clarified with Dr Venita Miller that he does want the patient transferred to [facility] to stay.   Notified Dr Wilson [facility] office of the need for discharge readmit orders

## 2020-11-10 NOTE — PROGRESS NOTES
BS 57 before dinner. Pt given orange juice. Repeat BS 68.  Pt states this is normal range for her. Dinner being served. Will recheck sugar after dinner.

## 2020-11-10 NOTE — PROGRESS NOTES
[facility] called regarding transfer to [facility], bed number [id number]. Will call back with estimated  time.

## 2020-11-10 NOTE — DISCHARGE SUMMARY
Physician Discharge Summary     Patient ID:  Lake Olszewski  [id number]  70 y.o.  1945    Admit date: 11/7/2020    Discharge date and time: 11/10/20     Admitting Physician: Caren Hauser MD     Consulting physicians:Dr. Heber Orta, Dr. Renan Machado    Admission Diagnoses: Elevated troponin [R77.8]  Elevated troponin [R77.8]    Discharge Diagnoses:    Acute kidney injury [facility]) 11/09/2020       Priority: High    Non-ST elevation MI (NSTEMI) ([facility] 11/08/2020       Priority: High    Elevated troponin 11/07/2020       Priority: High    Type 2 diabetes mellitus ([facility] 11/08/2020       Priority: Medium    Stage 3b chronic kidney disease 11/08/2020       Priority: Medium    Essential hypertension 11/08/2020       Priority: Low    Mitral regurgitation 11/08/2020       Priority: Low    Colon polyps 02/20/2014       Priority: Low       Hospital Course: Patient was placed on heparin and Brilinta. She did not continue to experience chest pain or significant dyspnea. She had a nuclear stress test performed showing EF35% and fixed wall motion abnormalities of anteroinferior and septal areas. Echocardiogram showed mild MR and EF estimated at 30-40 %. Blood sugars remained elevated and insulin was increased to control blood sugars better. Creatinine was 2.2, 2.4, and 2.3 during hospitalization. She is on NS at 60 cc per hour for renal protection in anticipation of dye study. Disposition: Discharge/ readmit to [facility] ([address] [place] [address] to have cardiac catheterization performed. Condition on discharge is stable.      Patient Instructions:      Medication List      ASK your doctor about these medications    Amaryl 4 MG tablet  Generic drug:  glimepiride     fenofibrate 160 MG tablet  Commonly known as:  TRIGLIDE     fluticasone-salmeterol 250-50 MCG/DOSE Aepb  Commonly known as:  ADVAIR     Jardiance 25 MG tablet  Generic drug:  empagliflozin     losartan 100 MG tablet  Commonly known as:  COZAAR     magnesium oxide 400 MG tablet  Commonly known as:  MAG-OX     metFORMIN 500 MG tablet  Commonly known as:  GLUCOPHAGE     raloxifene 60 MG tablet  Commonly known as:  EVISTA     Singulair 10 MG tablet  Generic drug:  montelukast     vitamin D-3 125 MCG (5000 UT) Tabs  Commonly known as:  cholecalciferol          Activity: ambulate in room  Diet: cardiac diet    Follow-up with cardiologist, nephrologist, and Dr. Salbador Frias at [facility].  Proceed with cardiac catheterization as soon as this can be scheduled.      Signed:  Max Aragon  11/10/2020  1:17 PM

## 2020-11-10 NOTE — CARE COORDINATION
CASE MANAGEMENT. Mic Posadas Chart reviewed. Met with patient and her daughter, Nemo Robles, at the bedside. All questions answered. They are on board with the plan of care. Stress test yesterday neg. Plan is for cardiac cath tomorrow. Continues on iv heparin gtt and ivfs for hydration. Ms Jason Cramer voices living alone and being independent of her ADL's. She has a glucometer and no other dme. Uses BMS when needed. No history of humberto, but has used hhc in the past s/p knee surgery. She plans to return home at discharge. No needs identified at this time, but will cont to follow along and assist accordingly. Follows with Dr Zaria Montoya and uses [address] [facility]      OF NOTE. . Ms Jason Cramer and Nemo Robles were inquiring about cath time tomorrow and visiting restrictions. Called and spoke with [facility] scheduling. Patient is not scheduled at this time. 1 visitor for the day is allowed. Both were updated.

## 2020-11-10 NOTE — PROGRESS NOTES
Dr. Mac Myers called regarding heart cath. Okay for pt to eat today as dr does not think procedure will be today.

## 2020-11-10 NOTE — PROGRESS NOTES
Pat Gomez Bela was ordered Jardiance, which is a nonformulary medication. The patient has indicated that the home supply of this medication will be brought in to the hospital for inpatient use. If the medication has not been administered by 1400 on the following day from the time the order was placed, a pharmacist will follow-up with the nurse of the patient to assess the capability of the patient to bring in the medication. If it is determined that the patient cannot supply the medication and it is not available to be dispensed from the pharmacy, a call will be placed to the ordering provider to discuss alternative options. Darnell Geiger Pharm. D.  11/10/2020  6:01 PM

## 2020-11-10 NOTE — PLAN OF CARE
I contacted the transfer center to begin arranging transfer downtown to [facility]. She can have diabetic diet today as I am not sure she will be able to undergo procedure today. This will also give kidneys another day of hydration.   Dr. José Miguel Brooks

## 2020-11-10 NOTE — LETTER
Whats most important for you to know is that your Medicare rights and benefits wont change because your health care provider is participating in [address]. Medicare will keep covering all of your medically necessary services. Even though Medicare will pay your doctor in a different way under [employer], how much you have to pay wont change. Health care providers and suppliers who are enrolled in Medicare will submit their Medicare claims like they always have. Youll have all the same Medicare rights and protections, including the right to choose which hospital, doctor, or other health care provider you see. If you dont want to get care from a health care provider whos participating in [address], then youll have to choose a different health care provider whos not participating in the Model. How can I give feedback about my health care? Medicare might ask you to take a voluntary survey about the services and care you received from [address] [place] [address] during your hospital stay or outpatient procedure and for a specific period of time afterwards. You can decide whether you want to take the voluntary survey, but if you do, itll help Medicare make [employer] and the care of other Medicare patients better. If you have concerns or complaints about your care, you can:   · Talk to your doctor or health care provider. · Contact your Beneficiary and Family Centered Care Quality Improvement   Organization [facility]). You can get your BF[employer]-QIOs phone number  at  Medicare.gov/contacts or by calling 1-800-MEDICARE. TTY users can call  2-957.471.5080. Where can I learn more about [employer]? Learn more about [employer] at https://innovation.cms.gov/initiatives/bpci-advanced/:  · A list of all the hospitals and physician group practices in the country participating in [address]. · All of the inpatient and outpatient Clinical Episodes that are currently included under [employer]. A Clinical Episode is a grouping of medical conditions or diagnoses that are included in the [address] [facility].

## 2020-11-11 LAB
ABO/RH: NORMAL
ANION GAP SERPL CALCULATED.3IONS-SCNC: 15 MMOL/L (ref 7–16)
ANTIBODY SCREEN: NORMAL
APTT: 79.4 SEC (ref 24.5–35.1)
APTT: 87.3 SEC (ref 24.5–35.1)
BUN BLDV-MCNC: 36 MG/DL (ref 8–23)
CALCIUM SERPL-MCNC: 8.3 MG/DL (ref 8.6–10.2)
CHLORIDE BLD-SCNC: 103 MMOL/L (ref 98–107)
CO2: 18 MMOL/L (ref 22–29)
CREAT SERPL-MCNC: 2.3 MG/DL (ref 0.5–1)
EKG ATRIAL RATE: 87 BPM
EKG P AXIS: 35 DEGREES
EKG P-R INTERVAL: 244 MS
EKG Q-T INTERVAL: 408 MS
EKG QRS DURATION: 100 MS
EKG QTC CALCULATION (BAZETT): 452 MS
EKG R AXIS: -27 DEGREES
EKG T AXIS: 96 DEGREES
EKG VENTRICULAR RATE: 74 BPM
GFR AFRICAN AMERICAN: 25
GFR NON-AFRICAN AMERICAN: 21 ML/MIN/1.73
GLUCOSE BLD-MCNC: 155 MG/DL (ref 74–99)
HCT VFR BLD CALC: 26.6 % (ref 34–48)
HEMOGLOBIN: 8.2 G/DL (ref 11.5–15.5)
MCH RBC QN AUTO: 26.5 PG (ref 26–35)
MCHC RBC AUTO-ENTMCNC: 30.8 % (ref 32–34.5)
MCV RBC AUTO: 85.8 FL (ref 80–99.9)
METER GLUCOSE: 159 MG/DL (ref 74–99)
METER GLUCOSE: 159 MG/DL (ref 74–99)
METER GLUCOSE: 171 MG/DL (ref 74–99)
METER GLUCOSE: 192 MG/DL (ref 74–99)
PDW BLD-RTO: 16.1 FL (ref 11.5–15)
PLATELET # BLD: 190 E9/L (ref 130–450)
PMV BLD AUTO: 9.3 FL (ref 7–12)
POTASSIUM SERPL-SCNC: 4 MMOL/L (ref 3.5–5)
RBC # BLD: 3.1 E12/L (ref 3.5–5.5)
SODIUM BLD-SCNC: 136 MMOL/L (ref 132–146)
WBC # BLD: 5.1 E9/L (ref 4.5–11.5)

## 2020-11-11 PROCEDURE — 86900 BLOOD TYPING SEROLOGIC ABO: CPT

## 2020-11-11 PROCEDURE — 6360000002 HC RX W HCPCS: Performed by: FAMILY MEDICINE

## 2020-11-11 PROCEDURE — 80048 BASIC METABOLIC PNL TOTAL CA: CPT

## 2020-11-11 PROCEDURE — 86901 BLOOD TYPING SEROLOGIC RH(D): CPT

## 2020-11-11 PROCEDURE — 85730 THROMBOPLASTIN TIME PARTIAL: CPT

## 2020-11-11 PROCEDURE — 85347 COAGULATION TIME ACTIVATED: CPT

## 2020-11-11 PROCEDURE — C1874 STENT, COATED/COV W/DEL SYS: HCPCS

## 2020-11-11 PROCEDURE — 92928 PRQ TCAT PLMT NTRAC ST 1 LES: CPT | Performed by: INTERNAL MEDICINE

## 2020-11-11 PROCEDURE — 2140000000 HC CCU INTERMEDIATE R&B

## 2020-11-11 PROCEDURE — 4A023N7 MEASUREMENT OF CARDIAC SAMPLING AND PRESSURE, LEFT HEART, PERCUTANEOUS APPROACH: ICD-10-PCS | Performed by: INTERNAL MEDICINE

## 2020-11-11 PROCEDURE — C1725 CATH, TRANSLUMIN NON-LASER: HCPCS

## 2020-11-11 PROCEDURE — 2709999900 HC NON-CHARGEABLE SUPPLY

## 2020-11-11 PROCEDURE — 85027 COMPLETE CBC AUTOMATED: CPT

## 2020-11-11 PROCEDURE — 6370000000 HC RX 637 (ALT 250 FOR IP): Performed by: INTERNAL MEDICINE

## 2020-11-11 PROCEDURE — 6360000002 HC RX W HCPCS

## 2020-11-11 PROCEDURE — 027035Z DILATION OF CORONARY ARTERY, ONE ARTERY WITH TWO DRUG-ELUTING INTRALUMINAL DEVICES, PERCUTANEOUS APPROACH: ICD-10-PCS | Performed by: INTERNAL MEDICINE

## 2020-11-11 PROCEDURE — 2580000003 HC RX 258: Performed by: INTERNAL MEDICINE

## 2020-11-11 PROCEDURE — 6370000000 HC RX 637 (ALT 250 FOR IP)

## 2020-11-11 PROCEDURE — 2580000003 HC RX 258: Performed by: FAMILY MEDICINE

## 2020-11-11 PROCEDURE — 6370000000 HC RX 637 (ALT 250 FOR IP): Performed by: FAMILY MEDICINE

## 2020-11-11 PROCEDURE — 86850 RBC ANTIBODY SCREEN: CPT

## 2020-11-11 PROCEDURE — C1894 INTRO/SHEATH, NON-LASER: HCPCS

## 2020-11-11 PROCEDURE — 93458 L HRT ARTERY/VENTRICLE ANGIO: CPT | Performed by: INTERNAL MEDICINE

## 2020-11-11 PROCEDURE — 2500000003 HC RX 250 WO HCPCS

## 2020-11-11 PROCEDURE — C1887 CATHETER, GUIDING: HCPCS

## 2020-11-11 PROCEDURE — C9600 PERC DRUG-EL COR STENT SING: HCPCS | Performed by: INTERNAL MEDICINE

## 2020-11-11 PROCEDURE — B2111ZZ FLUOROSCOPY OF MULTIPLE CORONARY ARTERIES USING LOW OSMOLAR CONTRAST: ICD-10-PCS | Performed by: INTERNAL MEDICINE

## 2020-11-11 PROCEDURE — 93005 ELECTROCARDIOGRAM TRACING: CPT | Performed by: INTERNAL MEDICINE

## 2020-11-11 PROCEDURE — 82962 GLUCOSE BLOOD TEST: CPT

## 2020-11-11 PROCEDURE — C1769 GUIDE WIRE: HCPCS

## 2020-11-11 PROCEDURE — 36415 COLL VENOUS BLD VENIPUNCTURE: CPT

## 2020-11-11 RX ORDER — SODIUM CHLORIDE 9 MG/ML
INJECTION, SOLUTION INTRAVENOUS CONTINUOUS
Status: DISCONTINUED | OUTPATIENT
Start: 2020-11-11 | End: 2020-11-12 | Stop reason: HOSPADM

## 2020-11-11 RX ORDER — SODIUM CHLORIDE 9 MG/ML
INJECTION, SOLUTION INTRAVENOUS CONTINUOUS
Status: DISCONTINUED | OUTPATIENT
Start: 2020-11-11 | End: 2020-11-11

## 2020-11-11 RX ORDER — SODIUM BICARBONATE 650 MG/1
1300 TABLET ORAL 2 TIMES DAILY
Status: DISCONTINUED | OUTPATIENT
Start: 2020-11-11 | End: 2020-11-12 | Stop reason: HOSPADM

## 2020-11-11 RX ORDER — CLOPIDOGREL BISULFATE 75 MG/1
75 TABLET ORAL DAILY
Status: DISCONTINUED | OUTPATIENT
Start: 2020-11-12 | End: 2020-11-12

## 2020-11-11 RX ORDER — OXYCODONE HYDROCHLORIDE AND ACETAMINOPHEN 5; 325 MG/1; MG/1
1 TABLET ORAL EVERY 4 HOURS PRN
Status: DISCONTINUED | OUTPATIENT
Start: 2020-11-11 | End: 2020-11-12 | Stop reason: HOSPADM

## 2020-11-11 RX ADMIN — SODIUM BICARBONATE 1300 MG: 650 TABLET ORAL at 22:21

## 2020-11-11 RX ADMIN — TICAGRELOR 90 MG: 90 TABLET ORAL at 21:18

## 2020-11-11 RX ADMIN — INSULIN LISPRO 3 UNITS: 100 INJECTION, SOLUTION INTRAVENOUS; SUBCUTANEOUS at 16:23

## 2020-11-11 RX ADMIN — Medication 2000 UNITS: at 08:10

## 2020-11-11 RX ADMIN — INSULIN LISPRO 2 UNITS: 100 INJECTION, SOLUTION INTRAVENOUS; SUBCUTANEOUS at 21:18

## 2020-11-11 RX ADMIN — Medication 1 TABLET: at 08:10

## 2020-11-11 RX ADMIN — ACETAMINOPHEN 650 MG: 325 TABLET ORAL at 03:23

## 2020-11-11 RX ADMIN — GLIMEPIRIDE 4 MG: 4 TABLET ORAL at 16:05

## 2020-11-11 RX ADMIN — TICAGRELOR 90 MG: 90 TABLET ORAL at 08:10

## 2020-11-11 RX ADMIN — MONTELUKAST SODIUM 10 MG: 10 TABLET ORAL at 21:18

## 2020-11-11 RX ADMIN — METOPROLOL SUCCINATE 25 MG: 25 TABLET, EXTENDED RELEASE ORAL at 08:07

## 2020-11-11 RX ADMIN — ACETAMINOPHEN 650 MG: 325 TABLET ORAL at 21:23

## 2020-11-11 RX ADMIN — SODIUM CHLORIDE: 9 INJECTION, SOLUTION INTRAVENOUS at 16:25

## 2020-11-11 RX ADMIN — MAGNESIUM GLUCONATE 500 MG ORAL TABLET 400 MG: 500 TABLET ORAL at 08:10

## 2020-11-11 RX ADMIN — SODIUM BICARBONATE 1300 MG: 650 TABLET ORAL at 16:05

## 2020-11-11 RX ADMIN — HEPARIN SODIUM 19 UNITS/KG/HR: 10000 INJECTION, SOLUTION INTRAVENOUS at 03:34

## 2020-11-11 RX ADMIN — SODIUM CHLORIDE, PRESERVATIVE FREE 10 ML: 5 INJECTION INTRAVENOUS at 21:24

## 2020-11-11 RX ADMIN — FENOFIBRATE 160 MG: 160 TABLET, FILM COATED ORAL at 08:10

## 2020-11-11 RX ADMIN — SODIUM CHLORIDE: 9 INJECTION, SOLUTION INTRAVENOUS at 03:34

## 2020-11-11 ASSESSMENT — PAIN DESCRIPTION - PAIN TYPE: TYPE: CHRONIC PAIN

## 2020-11-11 ASSESSMENT — PAIN DESCRIPTION - LOCATION: LOCATION: KNEE

## 2020-11-11 ASSESSMENT — PAIN SCALES - GENERAL
PAINLEVEL_OUTOF10: 4
PAINLEVEL_OUTOF10: 0
PAINLEVEL_OUTOF10: 2
PAINLEVEL_OUTOF10: 0
PAINLEVEL_OUTOF10: 0
PAINLEVEL_OUTOF10: 2
PAINLEVEL_OUTOF10: 4
PAINLEVEL_OUTOF10: 4
PAINLEVEL_OUTOF10: 5

## 2020-11-11 ASSESSMENT — PAIN DESCRIPTION - ORIENTATION: ORIENTATION: RIGHT;LEFT

## 2020-11-11 NOTE — PROGRESS NOTES
[facility] Lonny Moscoso Overall, MD Rodríguez, MD Jaspreet Salcedo, MD Shraddha Molina, MD Jennifer Hinojosa, CNP   Shi Burrell, JANE  Progress Note    11/11/2020    SUBJECTIVE:   11/10: Feeling better. No dyspnea at rest, though does have easy fatigability and some mild dyspnea with ambulating in the hallway. Denies cp/palp. No nausea or vomiting. No cough wheeze or sputum production. Appetite ok  11/11: Transferred to [address] [place] [address] for coronary artery catheterization. Off the floor in the Cath Lab at the time of my visit. .  Reviewed vital signs. Discussed with Dr. Yazmin Stallings:    Active Problems:    NSTEMI (non-ST elevated myocardial infarction) [facility])  Resolved Problems:    * No resolved hospital problems.  *         DIET:    Diet NPO, After Midnight     MEDS (scheduled):    sodium bicarbonate  1,300 mg Oral BID    sodium chloride flush  10 mL Intravenous 2 times per day    Arformoterol Tartrate  15 mcg Nebulization BID    And    budesonide  0.5 mg Nebulization BID    empagliflozin  25 mg Oral Daily    fenofibrate  160 mg Oral Daily    glimepiride  4 mg Oral BID WC    insulin lispro  0-18 Units Subcutaneous TID WC    insulin lispro  0-9 Units Subcutaneous Nightly    magnesium oxide  400 mg Oral Daily    metoprolol succinate  25 mg Oral Daily    montelukast  10 mg Oral Nightly    multivitamin  1 tablet Oral Daily    ticagrelor  90 mg Oral BID    Vitamin D  2,000 Units Oral Daily       MEDS (infusions):   heparin (PORCINE) Infusion 19 Units/kg/hr (11/11/20 0334)    dextrose      sodium chloride 65 mL/hr at 11/11/20 0334       MEDS (prn):  heparin (porcine), heparin (porcine), nitroGLYCERIN, acetaminophen **OR** acetaminophen, polyethylene glycol, sodium chloride flush, dextrose, dextrose, glucagon (rDNA), glucose, trimethobenzamide    PHYSICAL EXAM:     Patient Vitals for the past 24 hrs:   BP Temp Temp src Pulse Resp SpO2 Height Weight   11/11/20 post non-STEMI. Echocardiogram demonstrates wall motion abnormalities consistent with ischemia. Stress test results (11/9) noted. For coronary artery catheterization in the near future     1. Acute kidney injury, hemodynamically mediated, mild      2. Chronic kidney disease, stage IIIb, baseline creatinine 1.8-2 mg/dL     3. Anemia due to chronic kidney disease     4. Coronary artery disease status post non-STEMI     Urinary indices are consistent with prerenal azotemia  Renal ultrasound is notable for advanced bilateral renal cortical thinning, a few cysts, though no hydronephrosis      Given baseline serum creatinine she would be considered \"moderate\" risk for developing acute renal failure post contrast administration  Azotemia stable.   Nonoliguric.      --> Prophylaxis to prevent contrast-induced nephropathy would include IV normal saline at 1 cc/kg/h for 12 hours before, and continue for 12 hours after the procedure,   --> minimization of contrast volume at cath, ideally < 70 cc.    --> Continue IV normal saline at 1 cc/kg/h for now  --> Encourage oral intake  --> Follow labs, UO  --> Avoid nephrotoxins    discussed with cardiology, Dr. Tobias Araiza        Electronically signed by Fransisco Garcia MD on 11/11/2020 at 11:30 AM

## 2020-11-11 NOTE — PROCEDURES
Procedure:    1. Left heart cath  2. Percutaneous coronary artery intervention of the proximal to mid circumflex with a 3.0 x 22 mm drug-eluting stent. 3.  Percutaneous coronary artery intervention of the distal circumflex with a 0.0 x 12 mm drug-eluting stent. Complications: None    Physician: Poonam Patton DO. Assistant: evelyne    Indication: Non-ST elevation myocardial infarction  AUC: 8  AUC indication: 4    PCI AUC: 7  PCI AUC incication: 16    Anesthesia: 2% Xylocaine, intravenous fentanyl     Sedation: Intravenous Versed    Sedation time: I was present for sedation administration at 1413. I ended sedation at 1500 for a total face-to-face sedation time of 47 minutes. Estimated blood loss: Minimal    Specimens: none    Contrast used: 70 cc    Hemodynamics:  Opening Aortic pressure: 458/45  LV systolic pressure: 396  LVEDP: 28  No significant gradient across the aortic valve. Angiographic Results/findings:  Left Main: Very short. No angiographically significant stenosis. LAD: Moderately calcified. Mid diffuse 20% stenosis. D1: Tiny vessel. D2: No angiographically significant stenosis. Pamalee Bucker Cx: Proximal to mid long diffuse 95% stenosis, distal 70% stenosis. .  OM1: No angiographically significant stenosis. .  OM2: Very small vessel. OM 3: No angiographically significant stenosis. OM 4: No angiographically significant stenosis. RCA: Nondominant vessel. No angiographically significant stenosis. .    Interventional results:  Proximal circumflex lesion  PrePCI STEFANIE 3 flow. Successful predilatation of the proximal circumflex lesion with a 2.5 mm balloon. This lesion was then crossed and stented with a 2.5 x 22 mm drug-eluting stent to 12 atmospheres of pressure. This resulted in 0 percent residual stenosis with STEFANIE-3 flow. Distal circumflex lesion  PrePCI STEFANIE 3 flow. This lesion was then crossed and stented with a 3.0 x 12 mm drug-eluting stent to 12 atmospheres of pressure.   This resulted in 0 percent residual stenosis with STEFANIE-3 flow. Summary of the procedure:  After obtaining informed consent they were taken to the cardiac Cath Lab where the area over the right radial artery was prepped and draped in a sterile fashion. Using ultrasound guidance and a micropuncture technique a 6 [place] slender glide sheath was placed in the right radial artery. This was aspirated & flushed several times throughout the procedure. This was medicated with verapamil and nitroglycerin. A 5 f TIG diagnostic catheter was advanced over a wire to the root of the aorta. It was aspirated & flushed with saline. Pressures were obtained. This was then filled with contrast.  This was then manipulated into the left main coronary artery. 4 orthogonal views were obtained. A 5 f TIG diagnostic catheter was advanced & manipulated into the RCA using the same technique. 1 orthogonal views were then obtained. A 5 f angled pigtail was then advanced & manipulated into the LV. This was then aspirated & flushed with saline & pressures were obtained. No left ventriculogram was performed due to the renal insufficiency. The catheter was then aspirated & flushed with saline once again & pull back pressures were then obtained across the aortic vlave. They were then anticoagulated with heparin to maintain an ACT greater than 250. A 6 f every 3 5 guiding catheter was used. She is allergic to aspirin. She was already on Brilinta. She was given a dose of Plavix. A luge wire was advanced across the proximal and distal circumflex lesions and placed in the distal vessel. The proximal lesion was then crossed and predilated with a 2.5 mm balloon. The distal lesion was then crossed and primarily stented with a 3.0 x 12 mm drug-eluting stent to 12 jeri of pressure. The proximal lesion was then crossed and stented with a 3.0 x 22 mm drug-eluting stent inflated to 12 atmospheres of pressure.   Both lesions resulted in 0 percent residual stenosis STEFANIE 3 flow. The balloon and wire were removed. A follow-up angiogram was performed. This demonstrated 0% residual stenosis and excellent STEFANIE-3 flow. The radial sheath was removed and a vas band placed with good patent hemostasis. They tolerated the procedure well with no complications.

## 2020-11-11 NOTE — CARE COORDINATION
Transition of care: Pt going for cardiac cath. Met with pt's daughter, Cindi Grimes, in room. Pt lives alone in a 2 story home. Her bedroom is on the 1st floor. Independent with ADLs and drives. DME- glucometer.  PCP Is Dr. Erik Carlson and pharmacy is [facility] will follow

## 2020-11-11 NOTE — PLAN OF CARE
Problem: Falls - Risk of:  Goal: Will remain free from falls  Description: Will remain free from falls  11/11/2020 0203 by Laya Nelson RN  Outcome: Met This Shift  11/10/2020 1819 by Jennifer Barreto RN  Outcome: Met This Shift  Goal: Absence of physical injury  Description: Absence of physical injury  11/11/2020 0203 by Laya Nelson RN  Outcome: Met This Shift  11/10/2020 1819 by Jennifer Barreto RN  Outcome: Met This Shift

## 2020-11-11 NOTE — CONSULTS
The [address] [place] [address] of [address] [place] [address] CONSULT-[facility] CARDIOLOGY    Name: Karrie La    Age: 77 y.o. Date of Admission: 11/10/2020  5:35 PM    Date of Service: 11/11/2020    Reason for Consultation: Chest pain, non-STEMI    Referring Physician: Mansi Dewey    History of Present Illness: The patient is a 77y.o. year old female who initially presented to [facility] around 5 days ago with moderately severe chest pressure with arm and neck radiation. Subsequently evolved non-STEMI with abnormal ECG. Known chronic kidney disease with creatinine between 1.8 and 2.0, but creatinine during admission was 2.3. No further chest discomfort during admission and given chronic kidney disease, Lexiscan nuclear stress testing at [facility] showed fixed anteroseptal and inferoapical defects with no reversible ischemia and LVEF 35 to 40%. Echo showed anteroseptal hypokinesis with LVEF 35 to 40% and mild valvular regurgitation. Currently in bed and on heparin infusion along with Brilinta and being hydrated per nephrology orders. Past Medical History:   Hypertension, hyperlipidemia, non-insulin-requiring diabetes mellitus, stage III chronic kidney disease. No previously diagnosed CAD prior to this admission. Review of Systems:     Constitutional: No fever, chills, sweats  Cardiac: As per HPI  Pulmonary: As per HPI  HEENT: No visual disturbances or difficult swallowing  GI: No nausea, vomiting, diarrhea, abdominal pain, rectal bleeding  : No dysuria or hematuria  Endocrine: No excessive thirst, heat or cold intolerance. Musculoskeletal: Joint pain but no muscle aches.  No claudication  Skin: No skin breakdown or rashes  Neuro: No headache, confusion, or seizures  Psych: No depression, anxiety      Family History:  Family History   Problem Relation Age of Onset    Stroke Mother     Cancer Father         colon    Heart Disease Father        Social History:  Social History     Socioeconomic History    mL/hr at 11/11/20 0334 19 Units/kg/hr at 11/11/20 0334    nitroGLYCERIN (NITROSTAT) SL tablet 0.4 mg  0.4 mg Sublingual Q5 Min PRN Alaina Mason MD        acetaminophen (TYLENOL) tablet 650 mg  650 mg Oral Q6H PRN Alaina Mason MD   650 mg at 11/11/20 6823    Or    acetaminophen (TYLENOL) suppository 650 mg  650 mg Rectal Q6H PRN Alaina Mason MD        polyethylene glycol [facility]) packet 17 g  17 g Oral Daily PRN Alaina Mason MD        sodium chloride flush 0.9 % injection 10 mL  10 mL Intravenous 2 times per day Alaina Mason MD        sodium chloride flush 0.9 % injection 10 mL  10 mL Intravenous PRN Alaina Mason MD        dextrose 5 % solution  100 mL/hr Intravenous PRTIFFANIE Mason MD        dextrose 50 % IV solution  12.5 g Intravenous PRN Alaina Mason MD        glucagon (rDNA) injection 1 mg  1 mg Intramuscular PRN Alaina Mason MD        glucose (GLUTOSE) 40 % oral gel 15 g  15 g Oral PRN Alaina Mason MD        Arformoterol Tartrate [facility]) nebulizer solution 15 mcg  15 mcg Nebulization BID Alaina Mason MD        And    budesonide (PULMICORT) nebulizer suspension 500 mcg  0.5 mg Nebulization BID Alaina Mason MD        empagliflozin (JARDIANCE) tablet TABS 25 mg  25 mg Oral Daily Alaina Mason MD        fenofibrate (TRIGLIDE) tablet 160 mg  160 mg Oral Daily Alaina Mason MD        glimepiride (AMARYL) tablet 4 mg  4 mg Oral BID PERFECTO Mason MD        insulin lispro (HUMALOG) injection vial 0-18 Units  0-18 Units Subcutaneous TID PERFECTO Mason MD        insulin lispro (HUMALOG) injection vial 0-9 Units  0-9 Units Subcutaneous Nightly lAaina Mason MD   2 Units at 11/10/20 2133    magnesium oxide (MAG-OX) tablet 400 mg  400 mg Oral Daily Alaina Mason MD        metoprolol succinate (TOPROL XL) extended release tablet 25 mg  25 mg Oral Daily Alaina Mason MD        montelukast (SINGULAIR) tablet 10 mg  10 mg Oral Nightly Alaina Mason MD   10 mg at 11/10/20 2133    multivitamin 1 tablet  1 tablet Oral Daily Dave Beyer MD        ticagrelor [facility]) tablet 90 mg  90 mg Oral BID Dave Beyer MD   90 mg at 11/10/20 2133    trimethobenzamide (TIGAN) injection 200 mg  200 mg Intramuscular Q6H PRN Dave Beyer MD        Vitamin D (CHOLECALCIFEROL) tablet 2,000 Units  2,000 Units Oral Daily Dave Beyer MD        0.9 % sodium chloride infusion   Intravenous Continuous Onesimo Diallo MD 65 mL/hr at 11/11/20 0334        heparin (PORCINE) Infusion 19 Units/kg/hr (11/11/20 0334)    dextrose      sodium chloride 65 mL/hr at 11/11/20 0334       Physical Exam:  BP (!) 144/63   Pulse 81   Temp 97.6 °F (36.4 °C) (Temporal)   Resp 16   Ht 5' 3\" (1.6 m)   Wt 128 lb 12.8 oz (58.4 kg)   SpO2 98%   BMI 22.82 kg/m²   Weight change: Wt Readings from Last 3 Encounters:   11/11/20 128 lb 12.8 oz (58.4 kg)   11/10/20 128 lb 3.2 oz (58.2 kg)   12/29/16 133 lb 14.4 oz (60.7 kg)         General: Awake, alert, oriented x3, no acute distress  HEENT: Unremarkable  Neck: No JVD or bruits. Cardiac: Regular rate and rhythm, normal S1 and S2, no extra heart sounds, murmurs, heaves, thrills  Resp: Lungs clear without wheezing or crackles. No accessory muscle use or retraction  Abdomen: soft, nontender, nondistended, no gross organomegaly or mass  Skin: Warm and dry, no cyanosis. Musculoskeletal: normal tone and strength in the upper and lower extremities bilaterally  Neuro: Grossly unremarkable  Psych: Cooperative, and normal affect    Intake/Output:    Intake/Output Summary (Last 24 hours) at 11/11/2020 0704  Last data filed at 11/11/2020 0431  Gross per 24 hour   Intake 982.9 ml   Output 775 ml   Net 207.9 ml     No intake/output data recorded.     Laboratory Tests:  Lab Results   Component Value Date    CREATININE 2.3 (H) 11/11/2020    BUN 36 (H) 11/11/2020     11/11/2020    K 4.0 11/11/2020     11/11/2020    CO2 18 (L) 11/11/2020     No results for input(s): CKTOTAL, CKMB in the last 72 hours. Invalid input(s): TROPONONI  No results found for: BNP  Lab Results   Component Value Date    WBC 5.1 11/11/2020    RBC 3.10 11/11/2020    HGB 8.2 11/11/2020    HCT 26.6 11/11/2020    MCV 85.8 11/11/2020    MCH 26.5 11/11/2020    MCHC 30.8 11/11/2020    RDW 16.1 11/11/2020     11/11/2020    MPV 9.3 11/11/2020     Recent Labs     11/09/20  1110   ALKPHOS 47   ALT 11   AST 26   PROT 6.5   BILITOT 0.2   LABALBU 4.0     Lab Results   Component Value Date    MG 2.0 11/10/2020     Lab Results   Component Value Date    PROTIME 31.0 01/20/2018    INR 2.7 01/20/2018     No results found for: TSH  No components found for: CHLPL  Lab Results   Component Value Date    TRIG 387 (H) 11/10/2020     Lab Results   Component Value Date    HDL 38 11/10/2020     Lab Results   Component Value Date    LDLCALC 106 (H) 11/10/2020     Lab Results   Component Value Date    INR 2.7 01/20/2018       Admission ECG is currently pending. ASSESSMENT / PLAN:    #1.  Chest discomfort and non-STEMI-despite the fact that there was no reversible ischemia on recent Lexiscan, LVEF is decreased and she did have significant chest discomfort. Have recommended heart catheterization with intervention as or if appropriate later on today and she was transferred from [facility] for [facility]. SCAI indication 17, score 9. #2. Antiplatelet treatment-she claims hives with aspirin and was on clopidogrel at [facility] which I changed to Brilinta 90 mg twice daily. Continue IV heparin which can be stopped on call to the heart catheterization lab. #3.  Stage III chronic kidney disease-creatinine elevated at 2.3 but she was seen by nephrology, who recommended IV fluid which she has received for over 12 hours prior to heart catheterization. Plan for normal saline infusion at 60 cc/h for 12 hours after heart catheterization. #4. Hypertension-controlled. No new antihypertensives added.    Beta-blocker to be continued given non-STEMI and cardiomyopathy. #5. Hyperlipidemia-LDL elevated at 106. Goal is below 70. Claims allergy to statins and have started Zetia 10 mg nightly. May require PCSK9 inhibitors as outpatient. #6.  Diabetes-per primary team.    #7.  Continue to follow.     Electronically signed by Joshua Smith MD on 11/11/2020 at 7:04 AM

## 2020-11-11 NOTE — H&P
Marilin Davidson History and Physical      CHIEF COMPLAINT:  Chest pain    History Obtained From:  Patient    HISTORY OF PRESENT ILLNESS:    The patient is a 77 y.o. female with significant past medical history of type 2 DM and CKD-3b who presents with a NSTEMI on 11/7/20. She was admitted to [facility] and placed on Heparin. She has had resolution of the chest pain and feels that her breathing is good now. She has been transferred to [address] to have cardiac catheterization performed to further evaluate the cause of wall motion abnormalities seen on echo and on nuclear stress test.     Past Medical History:     has a past medical history of Anemia, Asthma, Colon polyps (2/20/2014), Diabetes ([facility] Diabetes mellitus ([facility] Environmental allergies, Full dentures, Gastric ulcer (2/20/2014), GERD (gastroesophageal reflux disease), High cholesterol, Hypertension, and Osteopenia. Past Surgical History:     has a past surgical history that includes Knee Arthrocentesis; Knee arthroscopy (Left, 1980's); Cholecystectomy, open (1980's early); Tubal ligation; Colonoscopy; Upper gastrointestinal endoscopy; Endoscopy, colon, diagnostic; Upper gastrointestinal endoscopy (04/18/2014); Appendectomy; and other surgical history (10/13/2014). Medications Prior to Admission:    Medications Prior to Admission: losartan (COZAAR) 100 MG tablet, Take 100 mg by mouth daily  empagliflozin (JARDIANCE) 25 MG tablet, Take 25 mg by mouth daily  raloxifene (EVISTA) 60 MG tablet, Take 60 mg by mouth nightly  magnesium oxide (MAG-OX) 400 MG tablet, Take 400 mg by mouth daily. montelukast (SINGULAIR) 10 MG tablet, Take 10 mg by mouth nightly. glimepiride (AMARYL) 4 MG tablet, Take 4 mg by mouth 2 times daily (with meals). metFORMIN (GLUCOPHAGE) 500 MG tablet, Take 1,500 mg by mouth 2 times daily (with meals)   fenofibrate 160 MG tablet, Take 160 mg by mouth nightly.   Cholecalciferol (VITAMIN D-3) 5000 UNITS TABS, Take 2,000 Units by mouth daily. Last dose 4/15/14  fluticasone-salmeterol (ADVAIR) 250-50 MCG/DOSE AEPB, Inhale 1 puff into the lungs as needed. Is weaning off as of 2/5/2014    Allergies:  Aspirin; Statins; and Nsaids    Social History:    reports that she has never smoked. She has never used smokeless tobacco. She reports current alcohol use. She reports that she does not use drugs. Family History:       Problem Relation Age of Onset    Stroke Mother     Cancer Father         colon    Heart Disease Father        REVIEW OF SYSTEMS:  CONSTITUTIONAL:  negative for  fevers, chills and anorexia  RESPIRATORY:  negative for  dry cough, dyspnea and chest pain  CARDIOVASCULAR:  negative for  edema, syncope  GASTROINTESTINAL:  negative for nausea, vomiting and change in bowel habits  GENITOURINARY:  negative for frequency, dysuria and hematuria  MUSCULOSKELETAL:  negative for  myalgias and arthralgias  NEUROLOGICAL:  negative for headaches and dizziness    PHYSICAL EXAM:  Vitals:  BP (!) 143/66   Pulse 91   Temp 97.3 °F (36.3 °C) (Temporal)   Resp 16   Ht 5' 3\" (1.6 m)   Wt 128 lb 12.8 oz (58.4 kg)   SpO2 98%   BMI 22.82 kg/m²   CONSTITUTIONAL:  awake, alert, cooperative, no apparent distress, and appears stated age  EYES:  Lids and lashes normal, pupils equal, round and reactive to light, extra ocular muscles intact, sclera clear, conjunctiva normal  ENT:  Normocephalic, without obvious abnormality, atraumatic, sinuses nontender on palpation, external ears without lesions, oral pharynx with moist mucus membranes, tonsils without erythema or exudates, gums normal and good dentition.   NECK:  Supple, symmetrical, trachea midline, no adenopathy, thyroid symmetric, not enlarged and no tenderness, skin normal  HEMATOLOGIC/LYMPHATICS:  no cervical lymphadenopathy  BACK:  Symmetric, no curvature, spinous processes are non-tender on palpation, paraspinous muscles are non-tender on palpation, no costal vertebral tenderness  LUNGS:  No increased work of breathing, good air exchange, clear to auscultation bilaterally, no crackles or wheezing  CARDIOVASCULAR:  RRR with no murmurs, no gallops, no rubs  ABDOMEN:  Flat, soft, non-tender, no HSM, no masses. CHEST/BREASTS:  No chest tenderness  GENITAL/URINARY:  deferred  MUSCULOSKELETAL:  There is no redness, warmth, or swelling of the joints. Full range of motion noted. Motor strength is 5 out of 5 all extremities bilaterally. Tone is normal.  NEUROLOGIC:  Awake, alert, oriented to name, place and time. Cranial nerves II-XII are grossly intact. Motor is 5 out of 5 bilaterally. Cerebellar finger to nose, heel to shin intact. Sensory is intact.   Babinski down going, Romberg negative, and gait is normal.  SKIN:  no bruising or bleeding and no rashes    DATA:  EKG:    CBC with Differential:    Lab Results   Component Value Date    WBC 5.1 11/11/2020    RBC 3.10 11/11/2020    HGB 8.2 11/11/2020    HCT 26.6 11/11/2020     11/11/2020    MCV 85.8 11/11/2020    MCH 26.5 11/11/2020    MCHC 30.8 11/11/2020    RDW 16.1 11/11/2020    LYMPHOPCT 15.4 11/07/2020    MONOPCT 7.6 11/07/2020    BASOPCT 0.5 11/07/2020    MONOSABS 0.50 11/07/2020    LYMPHSABS 1.01 11/07/2020    EOSABS 0.08 11/07/2020    BASOSABS 0.03 11/07/2020     CMP:    Lab Results   Component Value Date     11/11/2020    K 4.0 11/11/2020    K 4.3 11/08/2020     11/11/2020    CO2 18 11/11/2020    BUN 36 11/11/2020    CREATININE 2.3 11/11/2020    GFRAA 25 11/11/2020    LABGLOM 21 11/11/2020    GLUCOSE 155 11/11/2020    PROT 6.5 11/09/2020    LABALBU 4.0 11/09/2020    CALCIUM 8.3 11/11/2020    BILITOT 0.2 11/09/2020    ALKPHOS 47 11/09/2020    AST 26 11/09/2020    ALT 11 11/09/2020     PT/INR:    Lab Results   Component Value Date    PROTIME 31.0 01/20/2018    INR 2.7 01/20/2018     Last 3 Troponin:    Lab Results   Component Value Date    TROPONINI 0.11 11/09/2020    TROPONINI 0.29 11/07/2020    TROPONINI 0.28 11/07/2020 TSH:  No results found for: TSH  Radiology Review:  CXR with no infiltrates    ASSESSMENT AND PLAN:    Patient Active Problem List    Diagnosis Date Noted    [facility] 11/09/2020     Priority: High    Non-ST elevation MI (NSTEMI) ([facility] 11/08/2020     Priority: High    Elevated troponin 11/07/2020     Priority: High    Type 2 diabetes mellitus ([facility] 11/08/2020     Priority: Medium    Stage 3b chronic kidney disease 11/08/2020     Priority: Medium    Essential hypertension 11/08/2020     Priority: Low    Mitral regurgitation 11/08/2020     Priority: Low    Colon polyps 02/20/2014     Priority: Low    NSTEM[facility] (non-ST elevated [facility] infarction) [facility] 11/10/2020         Admit the patient. Proceed with cardiac catheterization per cardiologist's plan.

## 2020-11-11 NOTE — PROGRESS NOTES
Admit Date: 11/10/2020    Subjective:     Patient states she is feeling no chest pain and that her breathing feels good. No complaints of nausea. Scheduled Meds:   sodium chloride flush  10 mL Intravenous 2 times per day    Arformoterol Tartrate  15 mcg Nebulization BID    And    budesonide  0.5 mg Nebulization BID    empagliflozin  25 mg Oral Daily    fenofibrate  160 mg Oral Daily    glimepiride  4 mg Oral BID WC    insulin lispro  0-18 Units Subcutaneous TID WC    insulin lispro  0-9 Units Subcutaneous Nightly    magnesium oxide  400 mg Oral Daily    metoprolol succinate  25 mg Oral Daily    montelukast  10 mg Oral Nightly    multivitamin  1 tablet Oral Daily    ticagrelor  90 mg Oral BID    Vitamin D  2,000 Units Oral Daily     Continuous Infusions:   heparin (PORCINE) Infusion 19 Units/kg/hr (11/11/20 0334)    dextrose      sodium chloride 65 mL/hr at 11/11/20 0334     PRN Meds:heparin (porcine), heparin (porcine), nitroGLYCERIN, acetaminophen **OR** acetaminophen, polyethylene glycol, sodium chloride flush, dextrose, dextrose, glucagon (rDNA), glucose, trimethobenzamide      Objective:     I/O last 3 completed shifts: In: 982.9 [P.O.:240; I.V.:742.9]  Out: 775 [Urine:775]  No intake/output data recorded. BP (!) 143/66   Pulse 91   Temp 97.3 °F (36.3 °C) (Temporal)   Resp 16   Ht 5' 3\" (1.6 m)   Wt 128 lb 12.8 oz (58.4 kg)   SpO2 98%   BMI 22.82 kg/m²     LUNGS:  No increased work of breathing, good air exchange, clear to auscultation bilaterally, no crackles or wheezing  CARDIOVASCULAR:  RRR with no murmurs, no gallops, no rubs. ABDOMEN:  Flat, soft, non-tender. MUSCULOSKELETAL:  No cyanosis, no edema, no clubbing.      Data Review  CBC:   Recent Labs     11/09/20  0338 11/11/20 0431   WBC 7.6 5.1   HGB 11.0* 8.2*    190     BMP:    Recent Labs     11/09/20  1110 11/10/20  1010 11/11/20 0431    133 136   K 4.7 4.1 4.0   CL 96* 99 103   CO2 23 20* 18*   BUN 40* 41* 36*   CREATININE 2.4* 2.3* 2.3*   GLUCOSE 277* 458* 155*     Coagulation:   Lab Results   Component Value Date    INR 2.7 01/20/2018    APTT 79.4 11/11/2020     Cardiac markers: No results found for: CKMB, TROPONINT, MYOGLOBIN  Lab Results   Component Value Date    LABPROT 0.1 11/09/2020    LABPROT 0.1 11/09/2020    LABALBU 4.0 11/09/2020     Lab Results   Component Value Date    ALT 11 11/09/2020    AST 26 11/09/2020    ALKPHOS 47 11/09/2020    BILITOT 0.2 11/09/2020         Assessment:     Patient Active Problem List    Diagnosis Date Noted    [facility] 11/09/2020     Priority: High    Non-ST elevation MI (NSTEMI) [facility] 11/08/2020     Priority: High    Elevated troponin 11/07/2020     Priority: High    Type 2 diabetes mellitus ([facility] 11/08/2020     Priority: Medium    Stage 3b chronic kidney disease 11/08/2020     Priority: Medium    Essential hypertension 11/08/2020     Priority: Low    Mitral regurgitation 11/08/2020     Priority: Low    Colon polyps 02/20/2014     Priority: Low    NSTEMI (non-ST elevated myocardial infarction) [facility] 11/10/2020       Plan:     Proceed with cardiac catheterization as planned. Follow progress of renal function and continue IV fluids for 12 hours post catheterization.

## 2020-11-12 VITALS
TEMPERATURE: 97.8 F | DIASTOLIC BLOOD PRESSURE: 77 MMHG | BODY MASS INDEX: 22.96 KG/M2 | OXYGEN SATURATION: 100 % | RESPIRATION RATE: 16 BRPM | HEART RATE: 85 BPM | SYSTOLIC BLOOD PRESSURE: 135 MMHG | WEIGHT: 129.6 LBS | HEIGHT: 63 IN

## 2020-11-12 LAB
ANION GAP SERPL CALCULATED.3IONS-SCNC: 14 MMOL/L (ref 7–16)
APTT: 30 SEC (ref 24.5–35.1)
BUN BLDV-MCNC: 28 MG/DL (ref 8–23)
CALCIUM SERPL-MCNC: 8.5 MG/DL (ref 8.6–10.2)
CHLORIDE BLD-SCNC: 108 MMOL/L (ref 98–107)
CO2: 17 MMOL/L (ref 22–29)
CREAT SERPL-MCNC: 2 MG/DL (ref 0.5–1)
EKG ATRIAL RATE: 80 BPM
EKG P AXIS: 32 DEGREES
EKG P-R INTERVAL: 228 MS
EKG Q-T INTERVAL: 382 MS
EKG QRS DURATION: 106 MS
EKG QTC CALCULATION (BAZETT): 440 MS
EKG R AXIS: -24 DEGREES
EKG T AXIS: 89 DEGREES
EKG VENTRICULAR RATE: 80 BPM
FERRITIN: 536 NG/ML
GFR AFRICAN AMERICAN: 29
GFR NON-AFRICAN AMERICAN: 24 ML/MIN/1.73
GLUCOSE BLD-MCNC: 108 MG/DL (ref 74–99)
HCT VFR BLD CALC: 26.5 % (ref 34–48)
HEMOGLOBIN: 8.2 G/DL (ref 11.5–15.5)
IRON SATURATION: 12 % (ref 15–50)
IRON: 47 MCG/DL (ref 37–145)
MAGNESIUM: 2 MG/DL (ref 1.6–2.6)
MCH RBC QN AUTO: 27.1 PG (ref 26–35)
MCHC RBC AUTO-ENTMCNC: 30.9 % (ref 32–34.5)
MCV RBC AUTO: 87.5 FL (ref 80–99.9)
METER GLUCOSE: 133 MG/DL (ref 74–99)
METER GLUCOSE: 321 MG/DL (ref 74–99)
PDW BLD-RTO: 16.7 FL (ref 11.5–15)
PHOSPHORUS: 3.7 MG/DL (ref 2.5–4.5)
PLATELET # BLD: 184 E9/L (ref 130–450)
PMV BLD AUTO: 9.3 FL (ref 7–12)
POC ACT LR: 261 SECONDS
POC ACT LR: 312 SECONDS
POTASSIUM SERPL-SCNC: 4 MMOL/L (ref 3.5–5)
RBC # BLD: 3.03 E12/L (ref 3.5–5.5)
SODIUM BLD-SCNC: 139 MMOL/L (ref 132–146)
TOTAL IRON BINDING CAPACITY: 398 MCG/DL (ref 250–450)
WBC # BLD: 4.8 E9/L (ref 4.5–11.5)

## 2020-11-12 PROCEDURE — 6370000000 HC RX 637 (ALT 250 FOR IP): Performed by: INTERNAL MEDICINE

## 2020-11-12 PROCEDURE — 80048 BASIC METABOLIC PNL TOTAL CA: CPT

## 2020-11-12 PROCEDURE — 84100 ASSAY OF PHOSPHORUS: CPT

## 2020-11-12 PROCEDURE — 82728 ASSAY OF FERRITIN: CPT

## 2020-11-12 PROCEDURE — 82962 GLUCOSE BLOOD TEST: CPT

## 2020-11-12 PROCEDURE — 2580000003 HC RX 258: Performed by: FAMILY MEDICINE

## 2020-11-12 PROCEDURE — 36415 COLL VENOUS BLD VENIPUNCTURE: CPT

## 2020-11-12 PROCEDURE — 6370000000 HC RX 637 (ALT 250 FOR IP): Performed by: FAMILY MEDICINE

## 2020-11-12 PROCEDURE — 85730 THROMBOPLASTIN TIME PARTIAL: CPT

## 2020-11-12 PROCEDURE — 93005 ELECTROCARDIOGRAM TRACING: CPT | Performed by: INTERNAL MEDICINE

## 2020-11-12 PROCEDURE — 93010 ELECTROCARDIOGRAM REPORT: CPT | Performed by: INTERNAL MEDICINE

## 2020-11-12 PROCEDURE — 83550 IRON BINDING TEST: CPT

## 2020-11-12 PROCEDURE — 83735 ASSAY OF MAGNESIUM: CPT

## 2020-11-12 PROCEDURE — 85027 COMPLETE CBC AUTOMATED: CPT

## 2020-11-12 PROCEDURE — 83540 ASSAY OF IRON: CPT

## 2020-11-12 RX ORDER — NITROGLYCERIN 0.4 MG/1
TABLET SUBLINGUAL
Qty: 25 TABLET | Refills: 3 | Status: SHIPPED | OUTPATIENT
Start: 2020-11-12 | End: 2020-12-09

## 2020-11-12 RX ORDER — EZETIMIBE 10 MG/1
10 TABLET ORAL NIGHTLY
Status: DISCONTINUED | OUTPATIENT
Start: 2020-11-12 | End: 2020-11-12 | Stop reason: HOSPADM

## 2020-11-12 RX ORDER — ASPIRIN 81 MG/1
81 TABLET ORAL DAILY
Status: DISCONTINUED | OUTPATIENT
Start: 2020-11-12 | End: 2020-11-12 | Stop reason: HOSPADM

## 2020-11-12 RX ORDER — ASPIRIN 81 MG/1
81 TABLET ORAL DAILY
Qty: 30 TABLET | Refills: 3 | Status: SHIPPED | OUTPATIENT
Start: 2020-11-12

## 2020-11-12 RX ORDER — EZETIMIBE 10 MG/1
10 TABLET ORAL NIGHTLY
Qty: 30 TABLET | Refills: 3 | Status: SHIPPED | OUTPATIENT
Start: 2020-11-12

## 2020-11-12 RX ORDER — SODIUM BICARBONATE 650 MG/1
1300 TABLET ORAL 2 TIMES DAILY
Qty: 60 TABLET | Refills: 5 | Status: SHIPPED | OUTPATIENT
Start: 2020-11-12 | End: 2021-01-08

## 2020-11-12 RX ORDER — METOPROLOL SUCCINATE 25 MG/1
25 TABLET, EXTENDED RELEASE ORAL DAILY
Qty: 30 TABLET | Refills: 3 | Status: ON HOLD | OUTPATIENT
Start: 2020-11-12 | End: 2020-12-06 | Stop reason: HOSPADM

## 2020-11-12 RX ADMIN — ASPIRIN 81 MG: 81 TABLET, COATED ORAL at 08:46

## 2020-11-12 RX ADMIN — SODIUM CHLORIDE, PRESERVATIVE FREE 10 ML: 5 INJECTION INTRAVENOUS at 08:43

## 2020-11-12 RX ADMIN — TICAGRELOR 90 MG: 90 TABLET ORAL at 08:43

## 2020-11-12 RX ADMIN — Medication 1 TABLET: at 08:43

## 2020-11-12 RX ADMIN — Medication 2000 UNITS: at 08:43

## 2020-11-12 RX ADMIN — SODIUM BICARBONATE 1300 MG: 650 TABLET ORAL at 08:42

## 2020-11-12 RX ADMIN — FENOFIBRATE 160 MG: 160 TABLET, FILM COATED ORAL at 08:42

## 2020-11-12 RX ADMIN — GLIMEPIRIDE 4 MG: 4 TABLET ORAL at 08:43

## 2020-11-12 RX ADMIN — INSULIN LISPRO 12 UNITS: 100 INJECTION, SOLUTION INTRAVENOUS; SUBCUTANEOUS at 11:16

## 2020-11-12 RX ADMIN — MAGNESIUM GLUCONATE 500 MG ORAL TABLET 400 MG: 500 TABLET ORAL at 08:46

## 2020-11-12 RX ADMIN — METOPROLOL SUCCINATE 25 MG: 25 TABLET, EXTENDED RELEASE ORAL at 08:43

## 2020-11-12 ASSESSMENT — PAIN SCALES - GENERAL
PAINLEVEL_OUTOF10: 0
PAINLEVEL_OUTOF10: 0

## 2020-11-12 NOTE — PROGRESS NOTES
Admit Date: 11/10/2020    Subjective:     Patient states her breathing feels good and she has no chest pain. Scheduled Meds:   aspirin  81 mg Oral Daily    ezetimibe  10 mg Oral Nightly    sodium bicarbonate  1,300 mg Oral BID    ticagrelor  90 mg Oral BID    sodium chloride flush  10 mL Intravenous 2 times per day    Arformoterol Tartrate  15 mcg Nebulization BID    And    budesonide  0.5 mg Nebulization BID    empagliflozin  25 mg Oral Daily    fenofibrate  160 mg Oral Daily    glimepiride  4 mg Oral BID WC    insulin lispro  0-18 Units Subcutaneous TID WC    insulin lispro  0-9 Units Subcutaneous Nightly    magnesium oxide  400 mg Oral Daily    metoprolol succinate  25 mg Oral Daily    montelukast  10 mg Oral Nightly    multivitamin  1 tablet Oral Daily    Vitamin D  2,000 Units Oral Daily     Continuous Infusions:   sodium chloride 60 mL/hr at 11/11/20 1625    heparin (PORCINE) Infusion Stopped (11/11/20 1400)    dextrose       PRN Meds:oxyCODONE-acetaminophen, heparin (porcine), heparin (porcine), nitroGLYCERIN, acetaminophen **OR** acetaminophen, polyethylene glycol, sodium chloride flush, dextrose, dextrose, glucagon (rDNA), glucose, trimethobenzamide      Objective:     I/O last 3 completed shifts: In: 1260.3 [P.O.:420; I.V.:840.3]  Out: 850 [Urine:850]  No intake/output data recorded.   /77   Pulse 85   Temp 97.8 °F (36.6 °C) (Temporal)   Resp 16   Ht 5' 3\" (1.6 m)   Wt 129 lb 9.6 oz (58.8 kg)   SpO2 100%   BMI 22.96 kg/m²     LUNGS:  No increased work of breathing, good air exchange, clear to auscultation bilaterally, no crackles or wheezing  CARDIOVASCULAR:  RRR with no murmurs, no gallops, no rubs  ABDOMEN:  Flat, soft, non-tender  MUSCULOSKELETAL:  No cyanosis, no edema, no clubbing    Data Review  CBC:   Recent Labs     11/11/20 0431 11/12/20 0443   WBC 5.1 4.8   HGB 8.2* 8.2*    184     BMP:    Recent Labs     11/10/20  1010 11/11/20  0431 11/12/20 0443  136 139   K 4.1 4.0 4.0   CL 99 103 108*   CO2 20* 18* 17*   BUN 41* 36* 28*   CREATININE 2.3* 2.3* 2.0*   GLUCOSE 458* 155* 108*     Coagulation:   Lab Results   Component Value Date    INR 2.7 01/20/2018    APTT 30.0 11/12/2020     Cardiac markers: No results found for: CKMB, TROPONINT, MYOGLOBIN  Lab Results   Component Value Date    LABPROT 0.1 11/09/2020    LABPROT 0.1 11/09/2020    LABALBU 4.0 11/09/2020     Lab Results   Component Value Date    ALT 11 11/09/2020    AST 26 11/09/2020    ALKPHOS 47 11/09/2020    BILITOT 0.2 11/09/2020         Assessment:     Patient Active Problem List    Diagnosis Date Noted    Acute kidney injury ([facility] 11/09/2020     Priority: High    Non-ST elevation MI (NSTEMI) ([facility] 11/08/2020     Priority: High    Elevated troponin 11/07/2020     Priority: High    [facility] 11/08/2020     Priority: Medium    Stage 3b chronic kidney disease 11/08/2020     Priority: Medium    Essential hypertension 11/08/2020     Priority: Low    Mitral regurgitation 11/08/2020     Priority: Low    Colon polyps 02/20/2014     Priority: Low    NSTEMI (non-ST elevated myocardial infarction) ([facility] 11/10/2020        S/p placements of 2 stents in LAD yesterday        Creatinine decreased to 2.0 today. Plan:   Aspirin is being tolerated with no sign of allergic effects. Continue with Brilinta and Aspirin. Jardiance for sugar control.   Discharge home when ok with nephrologist.

## 2020-11-12 NOTE — PROGRESS NOTES
Cardiac Rehabilitation: Discharge instructions        Cardiac rehabilitation is a program for people who have a heart problem, such as a heart attack, coronary stent placed, heart failure, or a heart valve disease. The program includes exercise, lifestyle changes, education, and emotional support. Cardiac rehab can help you improve the quality of your life through better overall health. It can help you lose weight and feel better about yourself. On your cardiac rehab team, you may have your doctor, a nurse specialist, an exercise physiologist, and a dietitian. They will design your cardiac rehab program specifically for you. You will learn how to reduce your risk for heart problems, how to manage stress, and how to eat a heart-healthy diet. By the end of the program, you will be ready to maintain a healthier lifestyle on your own. Follow-up care is a key part of your treatment and safety. Be sure to make and go to all appointments, and call your doctor if you are having problems. It's also a good idea to know your test results and keep a list of the medicines you take. Please call to schedule your first appointment once you have been cleared by your cardiologist to attend Phase II Outpatient Cardiac Rehabilitation. Cardiac Rehabilitation Options:  [address] [place] [address].                                                                                           Cardiology Services  [place], [address]                                                                              [facility] [address], [facility]  Hours: M/W/F 7am-7pm & T/Th 7am-12pm                                                  [phone]

## 2020-11-12 NOTE — PROGRESS NOTES
Ivon sent to Dr Eloise Guido re: pending discharge if okay with him     Per Dr Eloise Guido, patient can be discharged. Patient to have bmp, mg, phos and CBC drawn on Monday, 11/16/2020.

## 2020-11-12 NOTE — PLAN OF CARE
Problem: Falls - Risk of:  Goal: Will remain free from falls  Description: Will remain free from falls  11/12/2020 0809 by Sharri Anderson RN  Outcome: Met This Shift  11/12/2020 0445 by Mehdi Breaux RN  Outcome: Met This Shift  Goal: Absence of physical injury  Description: Absence of physical injury  11/12/2020 0809 by Sharri Anderson RN  Outcome: Met This Shift  11/12/2020 0445 by Mehdi Breaux RN  Outcome: Met This Shift     Problem: Daily Care:  Goal: Daily care needs are met  Description: Daily care needs are met  Outcome: Met This Shift     Problem: Skin Integrity:  Goal: Skin integrity will stabilize  Description: Skin integrity will stabilize  Outcome: Met This Shift

## 2020-11-12 NOTE — PROGRESS NOTES
DAILY PROGRESS NOTE - [facility]    SUBJECTIVE:    She is being followed for non-STEMI and newly diagnosed ischemic cardiomyopathy. Heart catheterization yesterday 11/11 showed severe circumflex disease for which she had 2 overlapping MARLON to mid and distal circumflex. Only 70 cc of dye used. Placed on both Brilinta and clopidogrel yesterday because of aspirin allergy which causes a rash. Feeling well at this time with no chest pain, worsening dyspnea, palpitations, syncope, presyncope. Has received IV fluid since prior to PCI (last several days). Creatinine stable at 2.3. Hypertension, hyperlipidemia, non-insulin-requiring diabetes mellitus, stage IIIb chronic kidney disease followed by Dr. Toyin Tilley. Ejection fraction of 35 to 40% on recent echo during admission with anteroseptal hypokinesis after she presented with chest pain and elevated troponin. Does state that she has felt fine for several years and her allergist Dr. Uziel Benson told her he felt she could probably be restarted on aspirin daily. OBJECTIVE:    Her vital signs were reviewed today.     Vitals:    11/12/20 0215   BP: 136/60   Pulse: 75   Resp: 16   Temp: 97.9 °F (36.6 °C)   SpO2: 100%       Scheduled Meds:   sodium bicarbonate  1,300 mg Oral BID    clopidogrel  75 mg Oral Daily    ticagrelor  90 mg Oral BID    sodium chloride flush  10 mL Intravenous 2 times per day    Arformoterol Tartrate  15 mcg Nebulization BID    And    budesonide  0.5 mg Nebulization BID    empagliflozin  25 mg Oral Daily    fenofibrate  160 mg Oral Daily    glimepiride  4 mg Oral BID WC    insulin lispro  0-18 Units Subcutaneous TID     insulin lispro  0-9 Units Subcutaneous Nightly    magnesium oxide  400 mg Oral Daily    metoprolol succinate  25 mg Oral Daily    montelukast  10 mg Oral Nightly    multivitamin  1 tablet Oral Daily    Vitamin D  2,000 Units Oral Daily     Continuous Infusions:   sodium chloride 60 mL/hr at 11/11/20 4740    heparin (PORCINE) Infusion Stopped (11/11/20 1400)    dextrose       PRN Meds:.oxyCODONE-acetaminophen, heparin (porcine), heparin (porcine), nitroGLYCERIN, acetaminophen **OR** acetaminophen, polyethylene glycol, sodium chloride flush, dextrose, dextrose, glucagon (rDNA), glucose, trimethobenzamide    REVIEW OF SYSTEMS:     No pruritus, rash, bruising. Cardiac symptoms per HPI. No nausea, vomiting, abdominal pain, GI bleeding, change in bowel habits. No dysuria, urinary frequency, urgency, hematuria, flank pain. Joint pain but no muscle soreness, stiffness, aching. No headache, speech disturbance, lateralizing neurologic deficit. No hemoptysis, epistaxis, easy bruising. No anxiety, depression. No symptoms of hypothyroidism, hyperthyroidism, diabetes, heat or cold intolerance. FAMILY HISTORY: Pertinent for CAD in first-degree relatives. SOCIAL HISTORY: Negative for alcohol, tobacco, or illicit drug use. PHYSICAL EXAM:    General Appearance:  awake, alert, oriented, in no acute distress  Neck:  no bruits  Lungs:  Normal expansion. Clear to auscultation. No rales, rhonchi, or wheezing. Heart:  Heart sounds are normal.  Regular rate and rhythm without murmur, gallop or rub. Abdomen:  Soft, non-tender. Extremities: Extremities warm to touch, pink, with no edema. Neuro/musculosketal:  Unremarkable. LABS:    Recent Labs     11/09/20  1110 11/10/20  1010 11/11/20  0431    133 136   CREATININE 2.4* 2.3* 2.3*       Recent Labs     11/11/20  0431   HGB 8.2*       No results for input(s): INR in the last 72 hours. IMPRESSION:    #1.  Non-STEMI and MARLON to circumflex coronary artery-no further chest discomfort. Continued medical management as below. #2. Aspirin allergy-clopidogrel and Brilinta compete for the same platelet receptor and have stopped clopidogrel.   Her allergist feels that she can start aspirin again and have started baby aspirin daily with Brilinta, which should be continued for 1 year post PCI and stent placement. If she develops allergic symptoms from aspirin, would recommend aspirin desensitization as outpatient per her allergist so that it can be continued indefinitely. #3.  Ischemic cardiomyopathy-ejection fraction 35 to 40% and have continued beta-blocker. No ACE inhibitor or Entresto because of chronic kidney disease. Continue GLIFLOZIN SGLT-2 inhibitor Jardiance which should improve her cardiovascular prognosis given recent studies. #4.  Hypertension-blood pressure controlled. No new antihypertensives added. #5. Hyperlipidemia-continue fibric acid derivative and add Zetia 10 mg nightly as she claims an allergy to statin medication. #6.  Chronic kidney disease-creatinine is stable at 2.3 and she received only 70 cc of dye yesterday in the heart catheterization lab. Still receiving IV fluids which will be continued per nephrology recommendation. #7.  Feel that she is okay for discharge from my standpoint when okay from nephrology standpoint. I have arranged follow-up in my office in 4 weeks. Sign off.

## 2020-11-12 NOTE — CONSULTS
Met with patient and discussed that their physician has ordered a referral to our outpatient Phase II Cardiac Rehabilitation program. Reviewed the benefits of cardiac rehabilitation based on their diagnosis and personal risk factors. Patient demonstrates strong interest in Cardiac Rehabilitation at this time. Cardiac Rehabilitation brochure provided to patient/family. The Cardiac Rehabilitation Program has been provided the patient's referral information and pertinent patient details and history. The patient may call [facility] [place] at [phone] for additional information or questions. Contact information for [facility] and other choices close to the patient's residence have been provided in the discharge instructions so that the patient may call and schedule an appointment when cleared by their physician.  Thank you for the referral.

## 2020-11-13 NOTE — PROGRESS NOTES
CLINICAL PHARMACY NOTE: MEDS TO [address] Select Patient?: No  Total # of Prescriptions Filled: 6   The following medications were delivered to the patient:  · ZETIA 10 MG   · METOPROLOL TARTRATE 25 MG   · SODIUM BICARBONATE 650 MG   · NITROGLYCERIN 0.4 MG   · BRILINTA 90 MG   · METFORMIN 500 MG   Total # of Interventions Completed: 3  Time Spent (min): 30    Additional Documentation:

## 2020-12-02 ENCOUNTER — HOSPITAL ENCOUNTER (INPATIENT)
Age: 75
LOS: 4 days | Discharge: HOME | DRG: 393 | End: 2020-12-06
Attending: EMERGENCY MEDICINE | Admitting: FAMILY MEDICINE
Payer: MEDICARE

## 2020-12-02 ENCOUNTER — APPOINTMENT (OUTPATIENT)
Dept: GENERAL RADIOLOGY | Age: 75
DRG: 393 | End: 2020-12-02
Payer: MEDICARE

## 2020-12-02 PROBLEM — K92.2 UGIB (UPPER GASTROINTESTINAL BLEED): Status: ACTIVE | Noted: 2020-12-02

## 2020-12-02 LAB
ABO/RH: NORMAL
ALBUMIN SERPL-MCNC: 3.7 G/DL (ref 3.5–5.2)
ALP BLD-CCNC: 46 U/L (ref 35–104)
ALT SERPL-CCNC: 9 U/L (ref 0–32)
ANION GAP SERPL CALCULATED.3IONS-SCNC: 14 MMOL/L (ref 7–16)
ANTIBODY SCREEN: NORMAL
APTT: 28.6 SEC (ref 24.5–35.1)
AST SERPL-CCNC: 24 U/L (ref 0–31)
BILIRUB SERPL-MCNC: 0.3 MG/DL (ref 0–1.2)
BLOOD BANK DISPENSE STATUS: NORMAL
BLOOD BANK DISPENSE STATUS: NORMAL
BLOOD BANK PRODUCT CODE: NORMAL
BLOOD BANK PRODUCT CODE: NORMAL
BPU ID: NORMAL
BPU ID: NORMAL
BUN BLDV-MCNC: 37 MG/DL (ref 8–23)
CALCIUM SERPL-MCNC: 9.9 MG/DL (ref 8.6–10.2)
CHLORIDE BLD-SCNC: 96 MMOL/L (ref 98–107)
CO2: 21 MMOL/L (ref 22–29)
CREAT SERPL-MCNC: 2.6 MG/DL (ref 0.5–1)
DESCRIPTION BLOOD BANK: NORMAL
DESCRIPTION BLOOD BANK: NORMAL
GFR AFRICAN AMERICAN: 22
GFR NON-AFRICAN AMERICAN: 18 ML/MIN/1.73
GLUCOSE BLD-MCNC: 157 MG/DL (ref 74–99)
HCT VFR BLD CALC: 22.9 % (ref 34–48)
HEMOGLOBIN: 6.6 G/DL (ref 11.5–15.5)
INR BLD: 1.1
MAGNESIUM: 2.3 MG/DL (ref 1.6–2.6)
MCH RBC QN AUTO: 25.3 PG (ref 26–35)
MCHC RBC AUTO-ENTMCNC: 28.8 % (ref 32–34.5)
MCV RBC AUTO: 87.7 FL (ref 80–99.9)
PDW BLD-RTO: 17.2 FL (ref 11.5–15)
PLATELET # BLD: 318 E9/L (ref 130–450)
PMV BLD AUTO: 9.7 FL (ref 7–12)
POTASSIUM SERPL-SCNC: 3.9 MMOL/L (ref 3.5–5)
PRO-BNP: ABNORMAL PG/ML (ref 0–450)
PROTHROMBIN TIME: 12.8 SEC (ref 9.3–12.4)
RBC # BLD: 2.61 E12/L (ref 3.5–5.5)
SODIUM BLD-SCNC: 131 MMOL/L (ref 132–146)
TOTAL PROTEIN: 5.9 G/DL (ref 6.4–8.3)
TROPONIN: <0.01 NG/ML (ref 0–0.03)
WBC # BLD: 6.4 E9/L (ref 4.5–11.5)

## 2020-12-02 PROCEDURE — 96365 THER/PROPH/DIAG IV INF INIT: CPT

## 2020-12-02 PROCEDURE — 2140000000 HC CCU INTERMEDIATE R&B

## 2020-12-02 PROCEDURE — 86923 COMPATIBILITY TEST ELECTRIC: CPT

## 2020-12-02 PROCEDURE — 36430 TRANSFUSION BLD/BLD COMPNT: CPT

## 2020-12-02 PROCEDURE — 83735 ASSAY OF MAGNESIUM: CPT

## 2020-12-02 PROCEDURE — 71045 X-RAY EXAM CHEST 1 VIEW: CPT

## 2020-12-02 PROCEDURE — C9113 INJ PANTOPRAZOLE SODIUM, VIA: HCPCS | Performed by: EMERGENCY MEDICINE

## 2020-12-02 PROCEDURE — 99283 EMERGENCY DEPT VISIT LOW MDM: CPT

## 2020-12-02 PROCEDURE — 86901 BLOOD TYPING SEROLOGIC RH(D): CPT

## 2020-12-02 PROCEDURE — 36415 COLL VENOUS BLD VENIPUNCTURE: CPT

## 2020-12-02 PROCEDURE — 6360000002 HC RX W HCPCS: Performed by: EMERGENCY MEDICINE

## 2020-12-02 PROCEDURE — 85027 COMPLETE CBC AUTOMATED: CPT

## 2020-12-02 PROCEDURE — 84484 ASSAY OF TROPONIN QUANT: CPT

## 2020-12-02 PROCEDURE — 80053 COMPREHEN METABOLIC PANEL: CPT

## 2020-12-02 PROCEDURE — 2580000003 HC RX 258: Performed by: EMERGENCY MEDICINE

## 2020-12-02 PROCEDURE — 83880 ASSAY OF NATRIURETIC PEPTIDE: CPT

## 2020-12-02 PROCEDURE — 2580000003 HC RX 258: Performed by: STUDENT IN AN ORGANIZED HEALTH CARE EDUCATION/TRAINING PROGRAM

## 2020-12-02 PROCEDURE — 6360000002 HC RX W HCPCS: Performed by: STUDENT IN AN ORGANIZED HEALTH CARE EDUCATION/TRAINING PROGRAM

## 2020-12-02 PROCEDURE — 96366 THER/PROPH/DIAG IV INF ADDON: CPT

## 2020-12-02 PROCEDURE — 99222 1ST HOSP IP/OBS MODERATE 55: CPT | Performed by: SURGERY

## 2020-12-02 PROCEDURE — C9113 INJ PANTOPRAZOLE SODIUM, VIA: HCPCS | Performed by: STUDENT IN AN ORGANIZED HEALTH CARE EDUCATION/TRAINING PROGRAM

## 2020-12-02 PROCEDURE — 85730 THROMBOPLASTIN TIME PARTIAL: CPT

## 2020-12-02 PROCEDURE — P9016 RBC LEUKOCYTES REDUCED: HCPCS

## 2020-12-02 PROCEDURE — 86850 RBC ANTIBODY SCREEN: CPT

## 2020-12-02 PROCEDURE — 86900 BLOOD TYPING SEROLOGIC ABO: CPT

## 2020-12-02 PROCEDURE — 93005 ELECTROCARDIOGRAM TRACING: CPT | Performed by: NURSE PRACTITIONER

## 2020-12-02 PROCEDURE — 85610 PROTHROMBIN TIME: CPT

## 2020-12-02 PROCEDURE — 2580000003 HC RX 258: Performed by: FAMILY MEDICINE

## 2020-12-02 RX ORDER — SODIUM CHLORIDE 0.9 % (FLUSH) 0.9 %
10 SYRINGE (ML) INJECTION PRN
Status: DISCONTINUED | OUTPATIENT
Start: 2020-12-02 | End: 2020-12-06 | Stop reason: HOSPADM

## 2020-12-02 RX ORDER — ACETAMINOPHEN 325 MG/1
650 TABLET ORAL EVERY 4 HOURS PRN
Status: DISCONTINUED | OUTPATIENT
Start: 2020-12-02 | End: 2020-12-06 | Stop reason: HOSPADM

## 2020-12-02 RX ORDER — PANTOPRAZOLE SODIUM 40 MG/10ML
40 INJECTION, POWDER, LYOPHILIZED, FOR SOLUTION INTRAVENOUS 2 TIMES DAILY
Status: DISCONTINUED | OUTPATIENT
Start: 2020-12-02 | End: 2020-12-06

## 2020-12-02 RX ORDER — SODIUM CHLORIDE 0.9 % (FLUSH) 0.9 %
10 SYRINGE (ML) INJECTION EVERY 12 HOURS SCHEDULED
Status: DISCONTINUED | OUTPATIENT
Start: 2020-12-02 | End: 2020-12-06 | Stop reason: HOSPADM

## 2020-12-02 RX ORDER — 0.9 % SODIUM CHLORIDE 0.9 %
250 INTRAVENOUS SOLUTION INTRAVENOUS ONCE
Status: COMPLETED | OUTPATIENT
Start: 2020-12-02 | End: 2020-12-03

## 2020-12-02 RX ORDER — SODIUM CHLORIDE 9 MG/ML
10 INJECTION INTRAVENOUS 2 TIMES DAILY
Status: DISCONTINUED | OUTPATIENT
Start: 2020-12-02 | End: 2020-12-06

## 2020-12-02 RX ADMIN — SODIUM CHLORIDE 250 ML: 9 INJECTION, SOLUTION INTRAVENOUS at 19:43

## 2020-12-02 RX ADMIN — PANTOPRAZOLE SODIUM 80 MG: 40 INJECTION, POWDER, FOR SOLUTION INTRAVENOUS at 17:08

## 2020-12-02 RX ADMIN — Medication 10 ML: at 21:00

## 2020-12-02 RX ADMIN — SODIUM CHLORIDE, PRESERVATIVE FREE 10 ML: 5 INJECTION INTRAVENOUS at 22:30

## 2020-12-02 ASSESSMENT — ENCOUNTER SYMPTOMS
BACK PAIN: 0
RHINORRHEA: 0
VOMITING: 0
BLOOD IN STOOL: 1
NAUSEA: 0
SHORTNESS OF BREATH: 1
COUGH: 0
COLOR CHANGE: 0
ABDOMINAL PAIN: 0

## 2020-12-02 ASSESSMENT — PAIN SCALES - GENERAL
PAINLEVEL_OUTOF10: 0
PAINLEVEL_OUTOF10: 0

## 2020-12-02 NOTE — CONSULTS
 UPPER GASTROINTESTINAL ENDOSCOPY  04/18/2014       Medications Prior to Admission:    Prior to Admission medications    Medication Sig Start Date End Date Taking? Authorizing Provider   aspirin 81 MG EC tablet Take 1 tablet by mouth daily 11/12/20   Heriberto Chavez MD   sodium bicarbonate 650 MG tablet Take 2 tablets by mouth 2 times daily 11/12/20   Heriberto Chavez MD   nitroGLYCERIN (NITROSTAT) 0.4 MG SL tablet up to max of 3 total doses. If no relief after 1 dose, call 911. 11/12/20   Heriberto Chavez MD   metFORMIN (GLUCOPHAGE) 500 MG tablet Take 3 tablets by mouth 2 times daily (with meals) Resume this on 11/13/20 with supper dose. 11/12/20   Heriberto Chavez MD   ezetimibe (ZETIA) 10 MG tablet Take 1 tablet by mouth nightly 11/12/20   Heriberto Chavez MD   metoprolol succinate (TOPROL XL) 25 MG extended release tablet Take 1 tablet by mouth daily 11/12/20   Heriberto Chavez MD   ticagrelor (BRILINTA) 90 MG TABS tablet Take 1 tablet by mouth 2 times daily 11/12/20   Heriberto Chavez MD   empagliflozin (JARDIANCE) 25 MG tablet Take 25 mg by mouth daily    Historical Provider, MD   raloxifene (EVISTA) 60 MG tablet Take 60 mg by mouth nightly    Historical Provider, MD   magnesium oxide (MAG-OX) 400 MG tablet Take 400 mg by mouth daily. Historical Provider, MD   montelukast (SINGULAIR) 10 MG tablet Take 10 mg by mouth nightly. Historical Provider, MD   glimepiride (AMARYL) 4 MG tablet Take 4 mg by mouth 2 times daily (with meals). Historical Provider, MD   fenofibrate 160 MG tablet Take 160 mg by mouth nightly. Historical Provider, MD   Cholecalciferol (VITAMIN D-3) 5000 UNITS TABS Take 2,000 Units by mouth daily. Last dose 4/15/14    Historical Provider, MD   fluticasone-salmeterol (ADVAIR) 250-50 MCG/DOSE AEPB Inhale 1 puff into the lungs as needed.  Is weaning off as of 2/5/2014    Historical Provider, MD       Allergies   Allergen Reactions    Aspirin Hives    Statins Other (See Comments)     Muscle weakness    Nsaids Rash       Family History   Problem Relation Age of Onset    Stroke Mother     Cancer Father         colon    Heart Disease Father        Social History     Tobacco Use    Smoking status: Never Smoker    Smokeless tobacco: Never Used   Substance Use Topics    Alcohol use: Yes     Alcohol/week: 0.0 standard drinks     Comment: socially    Drug use: No         Review of Systems   General ROS: negative  Hematological and Lymphatic ROS: negative  Respiratory ROS: negative  Cardiovascular ROS: negative  Gastrointestinal ROS: As above  Genito-Urinary ROS: negative  Musculoskeletal ROS: negative      PHYSICAL EXAM:    Vitals:    12/02/20 1833   BP: (!) 163/65   Pulse: 72   Resp: 15   Temp:    SpO2:        General Appearance:  awake, alert, oriented, in no acute distress  Skin:  Skin color, texture, turgor normal. No rashes or lesions. Head/face:  NCAT  Eyes:  No gross abnormalities. Lungs: No increased work of breathing on room air  Heart:  RR and normotensive  Abdomen:  Soft, non-tender, normal bowel sounds. No bruits, organomegaly or masses. Extremities: Extremities warm to touch, pink, with no edema. Female Rectal: No lesions, masses, or hemorrhoids. Black stool on glove. FOBT+    LABS:    CBC  Recent Labs     12/02/20  1535   WBC 6.4   HGB 6.6*   HCT 22.9*        BMP  Recent Labs     12/02/20  1535   *   K 3.9   CL 96*   CO2 21*   BUN 37*   CREATININE 2.6*   CALCIUM 9.9     Liver Function  Recent Labs     12/02/20  1535   BILITOT 0.3   AST 24   ALT 9   ALKPHOS 46   PROT 5.9*   LABALBU 3.7     No results for input(s): LACTATE in the last 72 hours.   Recent Labs     12/02/20  1610   INR 1.1       RADIOLOGY    Xr Chest Portable    Result Date: 12/2/2020  EXAMINATION: ONE XRAY VIEW OF THE CHEST 12/2/2020 4:15 pm COMPARISON: 11/07/2020 HISTORY: ORDERING SYSTEM PROVIDED HISTORY: dyspnea TECHNOLOGIST PROVIDED HISTORY: Reason for exam:->dyspnea What reading provider will be dictating this exam?->CRC FINDINGS: There is a small patchy infiltrate seen within the right lung base. I cannot exclude a small patchy infiltrate within the left perihilar region. The lungs are hyperinflated. The heart is enlarged. There is no evidence of failure. 1. Small patchy infiltrate seen within the right lower lobe 2. Questionable patchy infiltrate within the left perihilar region 3. Cardiomegaly. There are no findings of failure      ASSESSMENT:  77 y.o. female with anemia and melena, FOBT+, likely due to upper GI bleed    PLAN:  - Admit to medicine  - Okay for CLD tonight  - NPO at midnight for procedure  - Plan for EGD tomorrow vs Friday  - Ensure adequate resuscitation with IVFs and blood products  - PPI BID  - Unable to hold anticoagulation due to recent stents  - Monitor H/H, transfuse per primary  - Discussed with Dr. Dunia Soni    Electronically signed by Maged Cordoba MD on 12/2/20 at 6:53 PM EST    I saw and examined the patient. I reviewed the above resident's note. I agree with the assessment and plan as outlined. REVIEW OF SYSTEMS:    Constitutional: negative  Eyes: negative  Ears, nose, mouth, throat, and face: negative  Respiratory: negative  Cardiovascular: negative  Gastrointestinal: as in hpi  Genitourinary:negative  Integument/breast: negative  Hematologic/lymphatic: negative  Musculoskeletal:negative  Neurological: negative  Allergic/Immunologic: negative    PHYSICAL EXAM   BP (!) 125/58   Pulse 80   Temp 97 °F (36.1 °C) (Temporal)   Resp 16   Ht 5' 3\" (1.6 m)   Wt 119 lb 12.8 oz (54.3 kg)   SpO2 98%   BMI 21.22 kg/m²     General appearance: alert, cooperative and in no acute distress. Eyes: grossly normal  Lungs: normal work of breathing  Heart: regular rate  Abdomen: soft, non-tender, non distended, no masses,  no organomegaly  Skin: No skin abnormalities  Neurologic: Alert and oriented x 3.  Grossly normal  Musculoskeletal: No edema    A/P GI bleed on anticoagulation with

## 2020-12-02 NOTE — ED PROVIDER NOTES
ED PROVIDER NOTE    Chief Complaint   Patient presents with    GI Bleeding     dark red stool x 1 week, hx anemia    Shortness of Breath     x 1 month, hx of MI       HPI:  12/2/20,   Time: 4:13 PM EMY Mancilla is a 77 y.o. female presenting to the ED for dark stools and shortness of breath. Gradual onset over the past few days, progressively worsening, moderate in severity, one episode of associated lightheadedness. No syncope, chest pain, abdominal pain, nausea, vomiting, diarrhea. On ASA and brilinta for recent cath and stent placement 1m ago. Decreased appetite. Normal urine output. Chart review: hx of HFrEF (30-40%), DM, CAD s/p stent placement    Review of Systems:     Review of Systems   Constitutional: Positive for appetite change and fatigue. Negative for chills and fever. HENT: Negative for congestion and rhinorrhea. Eyes: Negative for visual disturbance. Respiratory: Positive for shortness of breath. Negative for cough. Cardiovascular: Negative for chest pain. Gastrointestinal: Positive for blood in stool. Negative for abdominal pain, nausea and vomiting. Genitourinary: Negative for decreased urine volume and difficulty urinating. Musculoskeletal: Negative for back pain and neck pain. Skin: Negative for color change. Neurological: Positive for light-headedness.  Negative for dizziness, syncope, weakness, numbness and headaches.       --------------------------------------------- PAST HISTORY ---------------------------------------------  Past Medical History:   Past Medical History:   Diagnosis Date    Anemia     4/2014 put on iron supplement    Arthritis     Asthma     Bronchial asthma, getting better, going to Dr. Justin Amaral,  weaning off allergy shots and inhalers    Colon polyps 2/20/2014    Diabetes ([facility] [address]     Diabetes mellitus ([facility] [address]     am glucose running 130    Environmental allergies     spring/winter    Full dentures     Gastric ulcer 2/20/2014    GERD (gastroesophageal reflux disease)     High cholesterol     History of blood transfusion     Hypertension     124/70, has been good    Osteopenia        Past Surgical History:   Past Surgical History:   Procedure Laterality Date    APPENDECTOMY      CARDIAC CATHETERIZATION  11/11/2020    Dr Rafal Farley, OPEN  0767'F early    COLONOSCOPY      CORONARY ANGIOPLASTY WITH STENT PLACEMENT  11/11/2020    DR Floridalma Daily Pl [id number]  3.0x 22prox    ENDOSCOPY, COLON, DIAGNOSTIC      KNEE ARTHROCENTESIS      KNEE ARTHROSCOPY Left 1980's    OTHER SURGICAL HISTORY  10/13/2014    antrogade balloon enteroscopy with biopsy of polyp and scleratherapy    TUBAL LIGATION      UPPER GASTROINTESTINAL ENDOSCOPY      UPPER GASTROINTESTINAL ENDOSCOPY  04/18/2014       Social History:   Social History     Socioeconomic History    Marital status: Single     Spouse name: None    Number of children: None    Years of education: None    Highest education level: None   Occupational History    None   Social Needs    Financial resource strain: None    Food insecurity     Worry: None     Inability: None    Transportation needs     Medical: None     Non-medical: None   Tobacco Use    Smoking status: Never Smoker    Smokeless tobacco: Never Used   Substance and Sexual Activity    Alcohol use:  Yes     Alcohol/week: 0.0 standard drinks     Comment: socially    Drug use: No    Sexual activity: Not Currently     Partners: Male   Lifestyle    Physical activity     Days per week: None     Minutes per session: None    Stress: None   Relationships    Social connections     Talks on phone: None     Gets together: None     Attends Mu-ism service: None     Active member of club or organization: None     Attends meetings of clubs or organizations: None     Relationship status: None    Intimate partner violence     Fear of current or ex partner: None     Emotionally abused: None     Physically abused: None Forced sexual activity: None   Other Topics Concern    None   Social History Narrative    None       Family History:   Family History   Problem Relation Age of Onset    Stroke Mother     Cancer Father         colon    Heart Disease Father        The patients home medications have been reviewed. Allergies: Allergies   Allergen Reactions    Aspirin Hives    Statins Other (See Comments)     Muscle weakness    Nsaids Rash           ---------------------------------------------------PHYSICAL EXAM--------------------------------------    BP (!) 147/82   Pulse 64   Temp 97.5 °F (36.4 °C)   Resp 16   Ht 5' 3\" (1.6 m)   Wt 120 lb (54.4 kg)   SpO2 100%   BMI 21.26 kg/m²     Physical Exam  Constitutional:       General: She is not in acute distress. Appearance: She is not toxic-appearing. HENT:      Mouth/Throat:      Mouth: Mucous membranes are moist.   Eyes:      General: No scleral icterus. Extraocular Movements: Extraocular movements intact. Pupils: Pupils are equal, round, and reactive to light. Neck:      Musculoskeletal: Normal range of motion and neck supple. Cardiovascular:      Rate and Rhythm: Normal rate. Rhythm irregular. Pulses: Normal pulses. Heart sounds: Normal heart sounds. No murmur. Pulmonary:      Effort: Pulmonary effort is normal. No respiratory distress. Breath sounds: Normal breath sounds. No wheezing or rales. Abdominal:      General: There is no distension. Palpations: Abdomen is soft. Tenderness: There is no abdominal tenderness. Genitourinary:     Rectum: Guaiac result positive (melena). Musculoskeletal: Normal range of motion. General: No swelling or tenderness. Skin:     General: Skin is warm and dry. Neurological:      Mental Status: She is alert and oriented to person, place, and time.       Comments: Strength 5/5 and sensation grossly intact to light touch and equal bilaterally throughout all extremities -------------------------------------------------- RESULTS -------------------------------------------------  I have personally reviewed all laboratory and imaging results for this patient. Results are listed below. LABS:  Labs Reviewed   CBC - Abnormal; Notable for the following components:       Result Value    RBC 2.61 (*)     Hemoglobin 6.6 (*)     Hematocrit 22.9 (*)     MCH 25.3 (*)     MCHC 28.8 (*)     RDW 17.2 (*)     All other components within normal limits    Narrative:     CALL  [facility]  H34 tel. ,  Hematology results called to and read back by  DR OCASIO, 12/02/2020 16:31, by  Ty Arreaga METABOLIC PANEL - Abnormal; Notable for the following components:    Sodium 131 (*)     Chloride 96 (*)     CO2 21 (*)     Glucose 157 (*)     BUN 37 (*)     CREATININE 2.6 (*)     Total Protein 5.9 (*)     All other components within normal limits   BRAIN NATRIURETIC PEPTIDE - Abnormal; Notable for the following components:    Pro-BNP 10,704 (*)     All other components within normal limits   PROTIME-INR - Abnormal; Notable for the following components:    Protime 12.8 (*)     All other components within normal limits   TROPONIN   APTT   MAGNESIUM   TYPE AND SCREEN   PREPARE RBC (CROSSMATCH)       RADIOLOGY:  Interpreted personally and by Radiologist.  XR CHEST PORTABLE   Final Result   1. Small patchy infiltrate seen within the right lower lobe   2. Questionable patchy infiltrate within the left perihilar region   3. Cardiomegaly. There are no findings of failure          EKG: This EKG is signed and interpreted by the EP. Sinus rhythm, vent rate 57bpm, Mobitz I AV block vs bigeminy, diffuse ST depression and TWI that are new from prior EKG      ------------------------- NURSING NOTES AND VITALS REVIEWED ---------------------------   The nursing notes within the ED encounter and vital signs as below have been reviewed by myself.   BP (!) 147/82   Pulse 64   Temp 97.5 °F (36.4 °C)   Resp 16 Ht 5' 3\" (1.6 m)   Wt 120 lb (54.4 kg)   SpO2 100%   BMI 21.26 kg/m²   Oxygen Saturation Interpretation: Normal    The patients available past medical records and past encounters were reviewed. ------------------------------ ED COURSE/MEDICAL DECISION MAKING----------------------  Medications   0.9 % sodium chloride bolus (250 mLs Intravenous New Bag 20)   pantoprazole (PROTONIX) injection 40 mg (0 mg Intravenous Held 20)     And   sodium chloride (PF) 0.9 % injection 10 mL (10 mLs Intravenous Given 20)   sodium chloride flush 0.9 % injection 10 mL (has no administration in time range)   sodium chloride flush 0.9 % injection 10 mL (has no administration in time range)   acetaminophen (TYLENOL) tablet 650 mg (has no administration in time range)   pantoprazole (PROTONIX) 80 mg in sodium chloride 0.9 % 100 mL bolus (0 mg Intravenous Stopped 20)     Consultations:             General surgery    Critical Care: Please note that the withdrawal or failure to initiate urgent interventions for this patient would likely result in a life threatening deterioration or permanent disability. Accordingly this patient received 35 minutes of critical care time, excluding separately billable procedures. Counseling: The emergency provider has spoken with the patient and discussed todays results, in addition to providing specific details for the plan of care and counseling regarding the diagnosis and prognosis. Questions are answered at this time and they are agreeable with the plan. ED Course/Medical Decision Makin y.o. female here with melena. Non-toxic appearing, afebrile, hemodynamically stable, and in no acute distress. Workup shows acute anemia, mild increased creatinine, elevated pro BNP. Melena guaiac positive. On DAPT for recent stent placement. Clinically does not appear hypervolemic, no respiratory distress. Started IV PPI and PRBC transfusion.  General surgery consulted. Discussed w/ admitting physician who agrees w/ plan for admission for further management. Admitted in stable condition.       --------------------------------- IMPRESSION AND DISPOSITION ---------------------------------    IMPRESSION  1. UGIB (upper gastrointestinal bleed)    2. Acute blood loss anemia        DISPOSITION  Disposition: Admit to telemetry  Patient condition is stable    NOTE: This report was transcribed using voice recognition software.  Every effort was made to ensure accuracy; however, inadvertent computerized transcription errors may be present    Vicente Robin MD  Attending Emergency Physician          Vicente Robin MD  12/03/20 [id number]

## 2020-12-03 PROBLEM — I25.2 OLD MYOCARDIAL INFARCT: Chronic | Status: ACTIVE | Noted: 2020-11-10

## 2020-12-03 PROBLEM — R79.89 ELEVATED TROPONIN: Status: RESOLVED | Noted: 2020-11-07 | Resolved: 2020-12-03

## 2020-12-03 PROBLEM — E44.0 MODERATE MALNUTRITION (HCC): Status: ACTIVE | Noted: 2020-12-03

## 2020-12-03 PROBLEM — I50.22 CHRONIC SYSTOLIC (CONGESTIVE) HEART FAILURE (HCC): Chronic | Status: ACTIVE | Noted: 2020-12-03

## 2020-12-03 PROBLEM — I25.5 ISCHEMIC CARDIOMYOPATHY: Chronic | Status: ACTIVE | Noted: 2020-12-03

## 2020-12-03 PROBLEM — Z51.81 ENCOUNTER FOR MONITORING ANTIPLATELET THERAPY: Status: ACTIVE | Noted: 2020-12-03

## 2020-12-03 PROBLEM — K92.2 GI BLEED: Status: ACTIVE | Noted: 2020-12-03

## 2020-12-03 PROBLEM — I25.5 ISCHEMIC CARDIOMYOPATHY: Status: ACTIVE | Noted: 2020-12-03

## 2020-12-03 PROBLEM — R77.8 ELEVATED TROPONIN: Status: RESOLVED | Noted: 2020-11-07 | Resolved: 2020-12-03

## 2020-12-03 PROBLEM — I21.4 NON-ST ELEVATION MI (NSTEMI) (HCC): Status: RESOLVED | Noted: 2020-11-08 | Resolved: 2020-12-03

## 2020-12-03 PROBLEM — D50.0 IRON DEFICIENCY ANEMIA DUE TO CHRONIC BLOOD LOSS: Chronic | Status: ACTIVE | Noted: 2020-12-03

## 2020-12-03 PROBLEM — N17.9 ACUTE KIDNEY INJURY (HCC): Status: RESOLVED | Noted: 2020-11-09 | Resolved: 2020-12-03

## 2020-12-03 PROBLEM — Z79.02 ENCOUNTER FOR MONITORING ANTIPLATELET THERAPY: Status: ACTIVE | Noted: 2020-12-03

## 2020-12-03 PROBLEM — K92.2 UPPER GI BLEEDING: Status: ACTIVE | Noted: 2020-12-03

## 2020-12-03 PROBLEM — K92.2 UPPER GI BLEEDING: Status: RESOLVED | Noted: 2020-12-03 | Resolved: 2020-12-03

## 2020-12-03 PROBLEM — I25.2 OLD MYOCARDIAL INFARCT: Status: ACTIVE | Noted: 2020-11-10

## 2020-12-03 PROBLEM — I34.0 MITRAL REGURGITATION: Chronic | Status: ACTIVE | Noted: 2020-11-08

## 2020-12-03 PROBLEM — K92.2 GI BLEED: Status: RESOLVED | Noted: 2020-12-03 | Resolved: 2020-12-03

## 2020-12-03 PROBLEM — I50.22 CHRONIC SYSTOLIC (CONGESTIVE) HEART FAILURE (HCC): Status: ACTIVE | Noted: 2020-12-03

## 2020-12-03 PROBLEM — N18.9 ANEMIA OF CHRONIC KIDNEY FAILURE: Chronic | Status: ACTIVE | Noted: 2020-12-03

## 2020-12-03 PROBLEM — D63.1 ANEMIA OF CHRONIC KIDNEY FAILURE: Chronic | Status: ACTIVE | Noted: 2020-12-03

## 2020-12-03 PROBLEM — D62 ACUTE BLOOD LOSS ANEMIA: Status: ACTIVE | Noted: 2020-12-03

## 2020-12-03 LAB
EKG ATRIAL RATE: 87 BPM
EKG Q-T INTERVAL: 484 MS
EKG QRS DURATION: 102 MS
EKG QTC CALCULATION (BAZETT): 471 MS
EKG R AXIS: -7 DEGREES
EKG T AXIS: -143 DEGREES
EKG VENTRICULAR RATE: 57 BPM
HCT VFR BLD CALC: 29.4 % (ref 34–48)
HEMOGLOBIN: 9.3 G/DL (ref 11.5–15.5)
METER GLUCOSE: 119 MG/DL (ref 74–99)
METER GLUCOSE: 121 MG/DL (ref 74–99)
METER GLUCOSE: 159 MG/DL (ref 74–99)
METER GLUCOSE: 192 MG/DL (ref 74–99)

## 2020-12-03 PROCEDURE — 85014 HEMATOCRIT: CPT

## 2020-12-03 PROCEDURE — 6360000002 HC RX W HCPCS: Performed by: STUDENT IN AN ORGANIZED HEALTH CARE EDUCATION/TRAINING PROGRAM

## 2020-12-03 PROCEDURE — 6370000000 HC RX 637 (ALT 250 FOR IP): Performed by: INTERNAL MEDICINE

## 2020-12-03 PROCEDURE — 93010 ELECTROCARDIOGRAM REPORT: CPT | Performed by: INTERNAL MEDICINE

## 2020-12-03 PROCEDURE — C9113 INJ PANTOPRAZOLE SODIUM, VIA: HCPCS | Performed by: STUDENT IN AN ORGANIZED HEALTH CARE EDUCATION/TRAINING PROGRAM

## 2020-12-03 PROCEDURE — 82962 GLUCOSE BLOOD TEST: CPT

## 2020-12-03 PROCEDURE — 2580000003 HC RX 258: Performed by: STUDENT IN AN ORGANIZED HEALTH CARE EDUCATION/TRAINING PROGRAM

## 2020-12-03 PROCEDURE — 2580000003 HC RX 258: Performed by: FAMILY MEDICINE

## 2020-12-03 PROCEDURE — 2580000003 HC RX 258: Performed by: INTERNAL MEDICINE

## 2020-12-03 PROCEDURE — 85018 HEMOGLOBIN: CPT

## 2020-12-03 PROCEDURE — 36415 COLL VENOUS BLD VENIPUNCTURE: CPT

## 2020-12-03 PROCEDURE — 2140000000 HC CCU INTERMEDIATE R&B

## 2020-12-03 RX ORDER — DEXTROSE MONOHYDRATE 50 MG/ML
100 INJECTION, SOLUTION INTRAVENOUS PRN
Status: DISCONTINUED | OUTPATIENT
Start: 2020-12-03 | End: 2020-12-06 | Stop reason: HOSPADM

## 2020-12-03 RX ORDER — NICOTINE POLACRILEX 4 MG
15 LOZENGE BUCCAL PRN
Status: DISCONTINUED | OUTPATIENT
Start: 2020-12-03 | End: 2020-12-06 | Stop reason: HOSPADM

## 2020-12-03 RX ORDER — EZETIMIBE 10 MG/1
10 TABLET ORAL NIGHTLY
Status: DISCONTINUED | OUTPATIENT
Start: 2020-12-03 | End: 2020-12-03 | Stop reason: SDUPTHER

## 2020-12-03 RX ORDER — RALOXIFENE HYDROCHLORIDE 60 MG/1
60 TABLET, FILM COATED ORAL NIGHTLY
Status: DISCONTINUED | OUTPATIENT
Start: 2020-12-03 | End: 2020-12-06 | Stop reason: HOSPADM

## 2020-12-03 RX ORDER — METOPROLOL SUCCINATE 25 MG/1
25 TABLET, EXTENDED RELEASE ORAL DAILY
Status: DISCONTINUED | OUTPATIENT
Start: 2020-12-03 | End: 2020-12-04

## 2020-12-03 RX ORDER — CHOLECALCIFEROL (VITAMIN D3) 50 MCG
2000 TABLET ORAL DAILY
Status: DISCONTINUED | OUTPATIENT
Start: 2020-12-03 | End: 2020-12-06 | Stop reason: HOSPADM

## 2020-12-03 RX ORDER — DEXTROSE, SODIUM CHLORIDE, SODIUM LACTATE, POTASSIUM CHLORIDE, AND CALCIUM CHLORIDE 5; .6; .31; .03; .02 G/100ML; G/100ML; G/100ML; G/100ML; G/100ML
INJECTION, SOLUTION INTRAVENOUS CONTINUOUS
Status: DISCONTINUED | OUTPATIENT
Start: 2020-12-03 | End: 2020-12-03

## 2020-12-03 RX ORDER — EZETIMIBE 10 MG/1
10 TABLET ORAL NIGHTLY
Status: DISCONTINUED | OUTPATIENT
Start: 2020-12-03 | End: 2020-12-06 | Stop reason: HOSPADM

## 2020-12-03 RX ORDER — DEXTROSE MONOHYDRATE 25 G/50ML
12.5 INJECTION, SOLUTION INTRAVENOUS PRN
Status: DISCONTINUED | OUTPATIENT
Start: 2020-12-03 | End: 2020-12-06 | Stop reason: HOSPADM

## 2020-12-03 RX ORDER — MONTELUKAST SODIUM 10 MG/1
10 TABLET ORAL NIGHTLY
Status: DISCONTINUED | OUTPATIENT
Start: 2020-12-03 | End: 2020-12-06 | Stop reason: HOSPADM

## 2020-12-03 RX ORDER — FENOFIBRATE 160 MG/1
160 TABLET ORAL DAILY
Status: DISCONTINUED | OUTPATIENT
Start: 2020-12-03 | End: 2020-12-06 | Stop reason: HOSPADM

## 2020-12-03 RX ORDER — HYDRALAZINE HYDROCHLORIDE 25 MG/1
25 TABLET, FILM COATED ORAL 2 TIMES DAILY
Status: DISCONTINUED | OUTPATIENT
Start: 2020-12-03 | End: 2020-12-06 | Stop reason: HOSPADM

## 2020-12-03 RX ORDER — SODIUM BICARBONATE 650 MG/1
1300 TABLET ORAL 2 TIMES DAILY
Status: DISCONTINUED | OUTPATIENT
Start: 2020-12-03 | End: 2020-12-06 | Stop reason: HOSPADM

## 2020-12-03 RX ORDER — ISOSORBIDE DINITRATE 10 MG/1
20 TABLET ORAL 2 TIMES DAILY
Status: DISCONTINUED | OUTPATIENT
Start: 2020-12-03 | End: 2020-12-06 | Stop reason: HOSPADM

## 2020-12-03 RX ADMIN — Medication 2000 UNITS: at 11:48

## 2020-12-03 RX ADMIN — MONTELUKAST SODIUM 10 MG: 10 TABLET, FILM COATED ORAL at 20:07

## 2020-12-03 RX ADMIN — Medication 10 ML: at 08:28

## 2020-12-03 RX ADMIN — ISOSORBIDE DINITRATE 20 MG: 10 TABLET ORAL at 21:46

## 2020-12-03 RX ADMIN — PANTOPRAZOLE SODIUM 40 MG: 40 INJECTION, POWDER, FOR SOLUTION INTRAVENOUS at 20:07

## 2020-12-03 RX ADMIN — INSULIN LISPRO 1 UNITS: 100 INJECTION, SOLUTION INTRAVENOUS; SUBCUTANEOUS at 17:21

## 2020-12-03 RX ADMIN — SODIUM CHLORIDE, PRESERVATIVE FREE 10 ML: 5 INJECTION INTRAVENOUS at 20:19

## 2020-12-03 RX ADMIN — Medication 10 ML: at 20:08

## 2020-12-03 RX ADMIN — HYDRALAZINE HYDROCHLORIDE 25 MG: 25 TABLET, FILM COATED ORAL at 20:07

## 2020-12-03 RX ADMIN — METOPROLOL SUCCINATE 25 MG: 25 TABLET, EXTENDED RELEASE ORAL at 08:36

## 2020-12-03 RX ADMIN — MAGNESIUM GLUCONATE 500 MG ORAL TABLET 400 MG: 500 TABLET ORAL at 11:48

## 2020-12-03 RX ADMIN — ISOSORBIDE DINITRATE 20 MG: 10 TABLET ORAL at 14:07

## 2020-12-03 RX ADMIN — HYDRALAZINE HYDROCHLORIDE 25 MG: 25 TABLET, FILM COATED ORAL at 08:36

## 2020-12-03 RX ADMIN — SODIUM CHLORIDE, PRESERVATIVE FREE 10 ML: 5 INJECTION INTRAVENOUS at 08:29

## 2020-12-03 RX ADMIN — INSULIN LISPRO 1 UNITS: 100 INJECTION, SOLUTION INTRAVENOUS; SUBCUTANEOUS at 20:08

## 2020-12-03 RX ADMIN — FENOFIBRATE 160 MG: 160 TABLET ORAL at 14:07

## 2020-12-03 RX ADMIN — SODIUM BICARBONATE 1300 MG: 650 TABLET ORAL at 20:07

## 2020-12-03 RX ADMIN — SODIUM BICARBONATE 1300 MG: 650 TABLET ORAL at 11:48

## 2020-12-03 RX ADMIN — PANTOPRAZOLE SODIUM 40 MG: 40 INJECTION, POWDER, FOR SOLUTION INTRAVENOUS at 08:28

## 2020-12-03 RX ADMIN — SODIUM CHLORIDE, SODIUM LACTATE, POTASSIUM CHLORIDE, CALCIUM CHLORIDE AND DEXTROSE MONOHYDRATE: 5; 600; 310; 30; 20 INJECTION, SOLUTION INTRAVENOUS at 11:47

## 2020-12-03 RX ADMIN — EZETIMIBE 10 MG: 10 TABLET ORAL at 20:07

## 2020-12-03 ASSESSMENT — PAIN SCALES - GENERAL
PAINLEVEL_OUTOF10: 0
PAINLEVEL_OUTOF10: 0

## 2020-12-03 NOTE — CARE COORDINATION
Transition of care: Cardio and general surgery consulted. Met with pt in room. Pt lives alone in a [address] style home. Independent with ADLs and drives. DME- FWW, cane and a glucometer. Discharge plan is to return home. [facility] has good neighbors and a cousin, Kamla Whitfield, who assist her as needed.  PCP is Dr. Darryl Juarez and pharmacy is [facility] on [address]. Iker Anglin will follow

## 2020-12-03 NOTE — CONSULTS
Taking? Authorizing Provider   aspirin 81 MG EC tablet Take 1 tablet by mouth daily 11/12/20  Yes Art Webb MD   ezetimibe (ZETIA) 10 MG tablet Take 1 tablet by mouth nightly 11/12/20  Yes Art Webb MD   metoprolol succinate (TOPROL XL) 25 MG extended release tablet Take 1 tablet by mouth daily 11/12/20  Yes Art Webb MD   sodium bicarbonate 650 MG tablet Take 2 tablets by mouth 2 times daily 11/12/20   Art Webb MD   nitroGLYCERIN (NITROSTAT) 0.4 MG SL tablet up to max of 3 total doses. If no relief after 1 dose, call 911. 11/12/20   Art Webb MD   metFORMIN (GLUCOPHAGE) 500 MG tablet Take 3 tablets by mouth 2 times daily (with meals) Resume this on 11/13/20 with supper dose. 11/12/20   Art Webb MD   ticagrelor (BRILINTA) 90 MG TABS tablet Take 1 tablet by mouth 2 times daily 11/12/20   Art Webb MD   empagliflozin (JARDIANCE) 25 MG tablet Take 25 mg by mouth daily    Historical Provider, MD   raloxifene (EVISTA) 60 MG tablet Take 60 mg by mouth nightly    Historical Provider, MD   magnesium oxide (MAG-OX) 400 MG tablet Take 400 mg by mouth daily. Historical Provider, MD   montelukast (SINGULAIR) 10 MG tablet Take 10 mg by mouth nightly. Historical Provider, MD   glimepiride (AMARYL) 4 MG tablet Take 4 mg by mouth 2 times daily (with meals). Historical Provider, MD   fenofibrate 160 MG tablet Take 160 mg by mouth nightly. Historical Provider, MD   Cholecalciferol (VITAMIN D-3) 5000 UNITS TABS Take 2,000 Units by mouth daily. Last dose 4/15/14    Historical Provider, MD   fluticasone-salmeterol (ADVAIR) 250-50 MCG/DOSE AEPB Inhale 1 puff into the lungs as needed.  Is weaning off as of 2/5/2014    Historical Provider, MD       Current Medications:  Current Facility-Administered Medications   Medication Dose Route Frequency Provider Last Rate Last Dose    0.9 % sodium chloride bolus  250 mL Intravenous Once Ania Rodriguez MD 20 mL/hr at 12/02/20 1943 250 mL at 12/02/20 1943  pantoprazole (PROTONIX) injection 40 mg  40 mg Intravenous BID Jon Young MD   Stopped at 12/02/20 2230    And    sodium chloride (PF) 0.9 % injection 10 mL  10 mL Intravenous BID Jon Young MD   10 mL at 12/02/20 2230    sodium chloride flush 0.9 % injection 10 mL  10 mL Intravenous 2 times per day Brittany Madera MD   10 mL at 12/02/20 2100    sodium chloride flush 0.9 % injection 10 mL  10 mL Intravenous PRN Brittany Madera MD        acetaminophen (TYLENOL) tablet 650 mg  650 mg Oral Q4H PRN Brittany Madera MD             Review of Systems:   Constitutional: No fever, chills, sweats  Cardiac: As per HPI  Pulmonary: No cough, wheeze, hemoptysis  HEENT: No visual disturbances or difficult swallowing  GI: No nausea, vomiting, diarrhea, abdominal pain, positive melena Bleeding  : No dysuria or hematuria  Endocrine: No excessive thirst, heat or cold intolerance. Musculoskeletal: No joint pain or muscle aches. No claudication  Skin: No skin breakdown or rashes  Neuro: No headache, confusion, or seizures  Psych: No depression, anxiety    Physical Exam:  BP (!) 183/76   Pulse 73   Temp 98.3 °F (36.8 °C) (Temporal)   Resp 16   Ht 5' 3\" (1.6 m)   Wt 119 lb 12.8 oz (54.3 kg)   SpO2 98%   BMI 21.22 kg/m²   Weight change: Wt Readings from Last 3 Encounters:   12/02/20 119 lb 12.8 oz (54.3 kg)   11/12/20 129 lb 9.6 oz (58.8 kg)   11/10/20 128 lb 3.2 oz (58.2 kg)       General: Awake, alert, oriented x3, no acute distress    HEENT: Normocephalic and atraumatic, pupils equal and round. Sclera nonicteric and oral mucosa moist.  Tongue and trachea midline. Neck: No JVD or bruits. No thyroid enlargement or adenopathy    Cardiac: Regular rate and rhythm, normal S1 and S2, no S3. Apical impulse is normal.  No murmurs, rubs or clicks. No carotid bruits and no abdominal bruits. No peripheral edema, cyanosis or clubbing. Normal pulses in the upper and lower extremities bilaterally.     Resp: Unlabored respirations at rest.  No wheezes, rales or rhonchi. Abdomen: soft, nontender, nondistended, no gross organomegaly or mass    Skin: Warm and dry, no cyanosis. Musculoskeletal: normal tone and strength in the upper and lower extremities bilaterally    Neuro: Moves all extremities appropriately to command. Normal sensation to light touch in the upper and lower extremities bilaterally    Psych: Cooperative, and normal affect    Intake/Output:    Intake/Output Summary (Last 24 hours) at 12/3/2020 0806  Last data filed at 12/3/2020 0601  Gross per 24 hour   Intake 1050 ml   Output 150 ml   Net 900 ml     No intake/output data recorded.     Laboratory Tests:  Lab Results   Component Value Date    CREATININE 2.6 (H) 12/02/2020    BUN 37 (H) 12/02/2020     (L) 12/02/2020    K 3.9 12/02/2020    CL 96 (L) 12/02/2020    CO2 21 (L) 12/02/2020     Recent Labs     12/02/20  1535   TROPONINI <0.01     Lab Results   Component Value Date    PROBNP 10,704 (H) 12/02/2020     Lab Results   Component Value Date    WBC 6.4 12/02/2020    RBC 2.61 12/02/2020    HGB 9.3 12/03/2020    HCT 29.4 12/03/2020    MCV 87.7 12/02/2020    MCH 25.3 12/02/2020    MCHC 28.8 12/02/2020    RDW 17.2 12/02/2020     12/02/2020    MPV 9.7 12/02/2020     Recent Labs     12/02/20  1535   ALKPHOS 46   ALT 9   AST 24   PROT 5.9*   BILITOT 0.3   LABALBU 3.7     Lab Results   Component Value Date    MG 2.3 12/02/2020     Lab Results   Component Value Date    PROTIME 12.8 12/02/2020    INR 1.1 12/02/2020     No results found for: TSH  No components found for: CHLPL  Lab Results   Component Value Date    TRIG 387 (H) 11/10/2020     Lab Results   Component Value Date    HDL 38 11/10/2020     Lab Results   Component Value Date    LDLCALC 106 (H) 11/10/2020           Active Hospital Problems    Diagnosis    Mitral regurgitation [I34.0]     Priority: High    Stage 3b chronic kidney disease [N18.32]     Priority: Medium    Encounter for monitoring antiplatelet therapy [Y35.25, Z79.02]    Ischemic cardiomyopathy [I25.5]    Chronic systolic (congestive) heart failure (HCC) [I50.22]    UGIB (upper gastrointestinal bleed) [K92.2]    Old myocardial infarct [I25.2]    Essential hypertension [I10]             ASSESSMENT / PLAN:  1. GI bleed with recent non-STEMI and circumflex MARLON 2 weeks ago. Scheduled for endoscopy today. Would like to restart baby aspirin and Brilinta as soon as possible. High risk for acute stent thrombosis with recurrent MI    2. Ischemic cardiomyopathy and LVEF 35-40%, stable chronic systolic heart failure. Restart beta-blocker and use low-dose Isordil/hydralazine combination rather than ACE/ARB due to chronic kidney disease    3. Hypertension: Management as above    4. Hyperlipidemia: Restart statin    5.  chronic kidney disease.   Current creatinine 2.6 and was 2.3 last month          Electronically signed by Kimberli Akhtar MD on 12/3/2020 at 8:06 AM

## 2020-12-03 NOTE — PROGRESS NOTES
Comprehensive Nutrition Assessment    Type and Reason for Visit:  Initial, Positive Nutrition Screen    Nutrition Recommendations/Plan: Recommend continuing NPO status. Note, pt hypertriglyceridemia (387 mg/dl 11/10). Upon nutrition progression suggest cardiac diet w/ low fiber. Nutrition Assessment:  Pt w/ pmh DM, CKD III, HF, sp heart cath w/ stent x2 admin for tarry stoolsx1 wk. Pending EGD. Pt w/ significant wt loss & reduced po intake PTA. Malnutrition Assessment:  Malnutrition Status: Moderate malnutrition    Context:  Chronic Illness     Findings of the 6 clinical characteristics of malnutrition:  Energy Intake:  7 - 75% or less estimated energy requirements for 1 month or longer  Weight Loss:  7 - Greater than 10% over 6 months     Body Fat Loss:  Unable to assess     Muscle Mass Loss:  Unable to assess    Fluid Accumulation:  No significant fluid accumulation     Strength:  Not Performed    Estimated Daily Nutrient Needs:  Energy (kcal):  1629; kcal; Weight Used for Energy Requirements:  Current     Protein (g):  55-75g; Weight Used for Protein Requirements:  Current(1.1.-1.4 (Note pt w/ CKD III))        Fluid (ml/day):  Per clinical judgement; Method Used for Fluid Requirements:         Nutrition Related Findings:  A/Ox4, dentures, dark tarry stoolsx1 wk, +BS, no noted edema, I/O's WDL.       Wounds:  None       Current Nutrition Therapies:    Diet NPO Effective Now    Anthropometric Measures:  · Height: 5' 3\" (160 cm)  · Current Body Weight: 119 lb 11.4 oz (54.3 kg)(12/2)   · Admission Body Weight: (Same as CBW)    · Usual Body Weight: 128 lb 3.2 oz (58.2 kg)(11/7 Per EMR Poor wt hx Bed scale)     · Ideal Body Weight: 115 lbs; % Ideal Body Weight 104.1 %   · BMI: 21.2  · BMI Categories: Underweight (BMI less than 22) age over 72       Nutrition Diagnosis:   · Moderate malnutrition related to altered GI structure as evidenced by poor intake prior to admission, intake 0-25%, weight loss greater than or equal to 10% in 6 months      Nutrition Interventions:   Food and/or Nutrient Delivery:  Continue NPO  Nutrition Education/Counseling:  Education not indicated   Coordination of Nutrition Care:  Continue to monitor while inpatient    Goals:  Nutrition Progression       Nutrition Monitoring and Evaluation:   Food/Nutrient Intake Outcomes:  Diet Advancement/Tolerance  Physical Signs/Symptoms Outcomes:  Biochemical Data, Chewing or Swallowing, Nutrition Focused Physical Findings, Skin, Weight, Diarrhea, GI Status, Fluid Status or Edema, Hemodynamic Status     Discharge Planning:     Too soon to determine     Electronically signed by Raiza Edwards RD, LD on 12/3/20 at 8:51 AM EST    Contact: [phone]

## 2020-12-03 NOTE — H&P
History & Physical        Reason for admission:    History Obtained From:  patient    HISTORY OF PRESENT ILLNESS:    The patient is a 77 y.o. female who presents to the hospital complaining of melena she also complained of shortness of breath and weakness, patient states she had the melena approximately 4 days ago and then after 2 days it stopped to recur again the day she presented to the emergency room.   Patient has history of gastric ulcer, she had endoscopy done 2014 and 2017 which showed possible AVM, patient also has history of colonic polyps, patient hemoglobin was found to be 6.6 in the emergency room  Patient is on chronic aspirin and Brilinta, patient had non-STEMI 2 weeks ago which required placing her on aspirin and Brilinta, she has history of ischemic cardiomyopathy    Past Medical History:        Diagnosis Date    Anemia     4/2014 put on iron supplement    Arthritis     Asthma     Bronchial asthma, getting better, going to Dr. Sharlene Richards,  weaning off allergy shots and inhalers    Chronic systolic (congestive) heart failure ([address] 12/3/2020    Colon polyps 2/20/2014    Diabetes ([address]     Diabetes mellitus ([address]     am glucose running 130    Environmental allergies     spring/winter    Full dentures     Gastric ulcer 2/20/2014    GERD (gastroesophageal reflux disease)     High cholesterol     History of blood transfusion     Hypertension     124/70, has been good    Osteopenia        Past Surgical History:        Procedure Laterality Date    APPENDECTOMY      CARDIAC CATHETERIZATION  11/11/2020    Dr Georgina Beverly, OPEN  1980's early    COLONOSCOPY      CORONARY ANGIOPLASTY WITH STENT PLACEMENT  11/11/2020    DR Helms Rasp Resolute [id number]  3.0x 22prox    ENDOSCOPY, COLON, DIAGNOSTIC      KNEE ARTHROCENTESIS      KNEE ARTHROSCOPY Left 1980's    OTHER SURGICAL HISTORY  10/13/2014    antrogade balloon enteroscopy with biopsy of polyp and scleratherapy    TUBAL 01/19/2018    PROTIME 12.8 (H) 12/02/2020    PROTIME 31.0 (H) 01/20/2018    PROTIME 14.0 (H) 01/19/2018        U/A:  No results found for: NITRITE, COLORU, PHUR, LABCAST, WBCUA, RBCUA, MUCUS, TRICHOMONAS, YEAST, BACTERIA, CLARITYU, SPECGRAV, LEUKOCYTESUR, UROBILINOGEN, BILIRUBINUR, BLOODU, GLUCOSEU, AMORPHOUS       RADIOLOGY:   XR CHEST PORTABLE   Final Result   1. Small patchy infiltrate seen within the right lower lobe   2. Questionable patchy infiltrate within the left perihilar region   3. Cardiomegaly. There are no findings of failure          ASSESSMENT:  Anemia and melena likely upper GI bleed  History of non-STEMI 2 weeks ago  Ischemic cardiomyopathy  Patient Active Problem List   Diagnosis Code    Colon polyps K63.5    Elevated troponin R77.8    Non-ST elevation MI (NSTEMI) ([facility]) I21.4    Essential hypertension I10    Type 2 diabetes mellitus ([facility]) E11.9    Mitral regurgitation I34.0    Stage 3b chronic kidney disease N18.32    Acute kidney injury ([address] N17.9    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    UGIB (upper gastrointestinal bleed) K92.2    Encounter for monitoring antiplatelet therapy S50.29, Z79.02    Ischemic cardiomyopathy I25.5    Chronic systolic (congestive) heart failure ([facility]) I50.22    Moderate malnutrition ([facility]) E44.0    GI bleed K92.2    Upper GI bleeding K92.2       PLAN:  We will have to stop aspirin and Brilinta  Surgical consult, patient will require EGD  Blood transfusion  Cardiology to further evaluate.   See orders  Disposition:      Electronically signed by Monica Anaya MD on 12/3/2020 at 6:33 PM

## 2020-12-03 NOTE — PROGRESS NOTES
Message sent to surgical resident regarding patient deciding she does want to go through with the EGD.

## 2020-12-04 ENCOUNTER — ANESTHESIA EVENT (OUTPATIENT)
Dept: ENDOSCOPY | Age: 75
DRG: 393 | End: 2020-12-04
Payer: MEDICARE

## 2020-12-04 ENCOUNTER — ANESTHESIA (OUTPATIENT)
Dept: ENDOSCOPY | Age: 75
DRG: 393 | End: 2020-12-04
Payer: MEDICARE

## 2020-12-04 VITALS
OXYGEN SATURATION: 100 % | SYSTOLIC BLOOD PRESSURE: 142 MMHG | RESPIRATION RATE: 21 BRPM | DIASTOLIC BLOOD PRESSURE: 63 MMHG

## 2020-12-04 PROBLEM — I44.1 MOBITZ TYPE 1 SECOND DEGREE ATRIOVENTRICULAR BLOCK: Status: ACTIVE | Noted: 2020-12-04

## 2020-12-04 LAB
ANION GAP SERPL CALCULATED.3IONS-SCNC: 19 MMOL/L (ref 7–16)
BUN BLDV-MCNC: 35 MG/DL (ref 8–23)
CALCIUM SERPL-MCNC: 8.6 MG/DL (ref 8.6–10.2)
CHLORIDE BLD-SCNC: 97 MMOL/L (ref 98–107)
CO2: 16 MMOL/L (ref 22–29)
CREAT SERPL-MCNC: 2.6 MG/DL (ref 0.5–1)
GFR AFRICAN AMERICAN: 22
GFR NON-AFRICAN AMERICAN: 18 ML/MIN/1.73
GLUCOSE BLD-MCNC: 159 MG/DL (ref 74–99)
HCT VFR BLD CALC: 29.3 % (ref 34–48)
HEMOGLOBIN: 8.9 G/DL (ref 11.5–15.5)
METER GLUCOSE: 146 MG/DL (ref 74–99)
METER GLUCOSE: 152 MG/DL (ref 74–99)
METER GLUCOSE: 164 MG/DL (ref 74–99)
METER GLUCOSE: 183 MG/DL (ref 74–99)
POTASSIUM SERPL-SCNC: 3.5 MMOL/L (ref 3.5–5)
PROCALCITONIN: 0.31 NG/ML (ref 0–0.08)
SODIUM BLD-SCNC: 132 MMOL/L (ref 132–146)

## 2020-12-04 PROCEDURE — 2709999900 HC NON-CHARGEABLE SUPPLY: Performed by: SURGERY

## 2020-12-04 PROCEDURE — 3700000000 HC ANESTHESIA ATTENDED CARE: Performed by: SURGERY

## 2020-12-04 PROCEDURE — 2580000003 HC RX 258: Performed by: STUDENT IN AN ORGANIZED HEALTH CARE EDUCATION/TRAINING PROGRAM

## 2020-12-04 PROCEDURE — 0DJ08ZZ INSPECTION OF UPPER INTESTINAL TRACT, VIA NATURAL OR ARTIFICIAL OPENING ENDOSCOPIC: ICD-10-PCS | Performed by: SURGERY

## 2020-12-04 PROCEDURE — 80048 BASIC METABOLIC PNL TOTAL CA: CPT

## 2020-12-04 PROCEDURE — 7100000000 HC PACU RECOVERY - FIRST 15 MIN: Performed by: SURGERY

## 2020-12-04 PROCEDURE — 2140000000 HC CCU INTERMEDIATE R&B

## 2020-12-04 PROCEDURE — 85014 HEMATOCRIT: CPT

## 2020-12-04 PROCEDURE — 6360000002 HC RX W HCPCS: Performed by: STUDENT IN AN ORGANIZED HEALTH CARE EDUCATION/TRAINING PROGRAM

## 2020-12-04 PROCEDURE — 2580000003 HC RX 258: Performed by: FAMILY MEDICINE

## 2020-12-04 PROCEDURE — 82962 GLUCOSE BLOOD TEST: CPT

## 2020-12-04 PROCEDURE — 3609017100 HC EGD: Performed by: SURGERY

## 2020-12-04 PROCEDURE — 36415 COLL VENOUS BLD VENIPUNCTURE: CPT

## 2020-12-04 PROCEDURE — 7100000001 HC PACU RECOVERY - ADDTL 15 MIN: Performed by: SURGERY

## 2020-12-04 PROCEDURE — 6370000000 HC RX 637 (ALT 250 FOR IP): Performed by: INTERNAL MEDICINE

## 2020-12-04 PROCEDURE — 43235 EGD DIAGNOSTIC BRUSH WASH: CPT | Performed by: SURGERY

## 2020-12-04 PROCEDURE — C9113 INJ PANTOPRAZOLE SODIUM, VIA: HCPCS | Performed by: STUDENT IN AN ORGANIZED HEALTH CARE EDUCATION/TRAINING PROGRAM

## 2020-12-04 PROCEDURE — 84145 PROCALCITONIN (PCT): CPT

## 2020-12-04 PROCEDURE — 85018 HEMOGLOBIN: CPT

## 2020-12-04 PROCEDURE — 6370000000 HC RX 637 (ALT 250 FOR IP): Performed by: SURGERY

## 2020-12-04 PROCEDURE — 3700000001 HC ADD 15 MINUTES (ANESTHESIA): Performed by: SURGERY

## 2020-12-04 RX ORDER — METOPROLOL SUCCINATE 25 MG/1
12.5 TABLET, EXTENDED RELEASE ORAL DAILY
Status: DISCONTINUED | OUTPATIENT
Start: 2020-12-05 | End: 2020-12-05

## 2020-12-04 RX ORDER — ASPIRIN 81 MG/1
81 TABLET ORAL DAILY
Status: DISCONTINUED | OUTPATIENT
Start: 2020-12-04 | End: 2020-12-06 | Stop reason: HOSPADM

## 2020-12-04 RX ADMIN — BISACODYL 10 MG: 5 TABLET, COATED ORAL at 21:19

## 2020-12-04 RX ADMIN — ISOSORBIDE DINITRATE 20 MG: 10 TABLET ORAL at 09:22

## 2020-12-04 RX ADMIN — INSULIN LISPRO 1 UNITS: 100 INJECTION, SOLUTION INTRAVENOUS; SUBCUTANEOUS at 17:49

## 2020-12-04 RX ADMIN — PANTOPRAZOLE SODIUM 40 MG: 40 INJECTION, POWDER, FOR SOLUTION INTRAVENOUS at 09:22

## 2020-12-04 RX ADMIN — SODIUM BICARBONATE 1300 MG: 650 TABLET ORAL at 09:23

## 2020-12-04 RX ADMIN — HYDRALAZINE HYDROCHLORIDE 25 MG: 25 TABLET, FILM COATED ORAL at 09:22

## 2020-12-04 RX ADMIN — SODIUM CHLORIDE, PRESERVATIVE FREE 10 ML: 5 INJECTION INTRAVENOUS at 09:24

## 2020-12-04 RX ADMIN — MAGNESIUM GLUCONATE 500 MG ORAL TABLET 400 MG: 500 TABLET ORAL at 09:24

## 2020-12-04 RX ADMIN — FENOFIBRATE 160 MG: 160 TABLET ORAL at 09:22

## 2020-12-04 RX ADMIN — MAGNESIUM CITRATE 592 ML: 1.75 LIQUID ORAL at 16:02

## 2020-12-04 RX ADMIN — Medication 2000 UNITS: at 09:23

## 2020-12-04 RX ADMIN — EZETIMIBE 10 MG: 10 TABLET ORAL at 21:11

## 2020-12-04 RX ADMIN — BISACODYL 10 MG: 5 TABLET, COATED ORAL at 11:45

## 2020-12-04 RX ADMIN — SODIUM BICARBONATE 1300 MG: 650 TABLET ORAL at 21:11

## 2020-12-04 RX ADMIN — ISOSORBIDE DINITRATE 20 MG: 10 TABLET ORAL at 21:11

## 2020-12-04 RX ADMIN — Medication 10 ML: at 09:22

## 2020-12-04 RX ADMIN — HYDRALAZINE HYDROCHLORIDE 25 MG: 25 TABLET, FILM COATED ORAL at 21:11

## 2020-12-04 RX ADMIN — Medication 10 ML: at 21:12

## 2020-12-04 RX ADMIN — SODIUM CHLORIDE, PRESERVATIVE FREE 10 ML: 5 INJECTION INTRAVENOUS at 21:12

## 2020-12-04 RX ADMIN — PANTOPRAZOLE SODIUM 40 MG: 40 INJECTION, POWDER, FOR SOLUTION INTRAVENOUS at 21:12

## 2020-12-04 RX ADMIN — METOPROLOL SUCCINATE 25 MG: 25 TABLET, EXTENDED RELEASE ORAL at 09:22

## 2020-12-04 RX ADMIN — INSULIN LISPRO 1 UNITS: 100 INJECTION, SOLUTION INTRAVENOUS; SUBCUTANEOUS at 11:45

## 2020-12-04 RX ADMIN — ASPIRIN 81 MG: 81 TABLET, COATED ORAL at 16:02

## 2020-12-04 RX ADMIN — MONTELUKAST SODIUM 10 MG: 10 TABLET, FILM COATED ORAL at 21:11

## 2020-12-04 ASSESSMENT — PAIN SCALES - GENERAL
PAINLEVEL_OUTOF10: 0

## 2020-12-04 NOTE — PROGRESS NOTES
Message sent to surgery regarding restarting brilinta and aspirin, per surgery team okay to restart aspirin but would like to hold off on brilinta until after colonoscopy tomorrow

## 2020-12-04 NOTE — PROGRESS NOTES
[facility] Cardiology    INPATIENT CARDIOLOGY FOLLOW-UP    Name: Deidra Talbot    Age: 77 y.o. Date of Admission: 12/2/2020  3:57 PM    Date of Service: 12/4/2020    Chief Complaint: Follow-up for coronary artery disease with recent non-STEMI and circumflex MARLON, ischemic cardiomyopathy and systolic heart failure, GI bleed/anemia, chronic kidney disease    Interim History:  No new overnight cardiac complaints. Currently with no complaints of CP, SOB, palpitations, dizziness, or lightheadedness. EGD negative, and scheduled for colonoscopy tomorrow      Telemetry  reviewed and showed sinus rhythm with asymptomatic type I second-degree heart block yesterday night  Hemoglobin 8.9      Review of Systems:   Cardiac: As per HPI  General: No fever, chills  Pulmonary: No cough, wheeze, or shortness of breath  GI: No nausea, vomiting,or abdominal pain  Neuro: No headache or confusion      Allergies:   Allergies   Allergen Reactions    Aspirin Hives    Statins Other (See Comments)     Muscle weakness    Nsaids Rash       Current Medications:  Current Facility-Administered Medications   Medication Dose Route Frequency Provider Last Rate Last Dose    bisacodyl (DULCOLAX) EC tablet 10 mg  10 mg Oral Once Vi Patricia MD        magnesium citrate solution 592 mL  592 mL Oral Once Vi Patricia MD        bisacodyl (DULCOLAX) EC tablet 10 mg  10 mg Oral Once Vi Patricia MD        [START ON 12/5/2020] metoprolol succinate (TOPROL XL) extended release tablet 12.5 mg  12.5 mg Oral Daily Isis Jorgensen MD        aspirin EC tablet 81 mg  81 mg Oral Daily Isis Jorgensen MD        [facility]) tablet 90 mg  90 mg Oral BID Isis Jorgensen MD        isosorbide dinitrate (ISORDIL) tablet 20 mg  20 mg Oral BID Isis Jorgensen MD   20 mg at 12/04/20 9362    hydrALAZINE (APRESOLINE) tablet 25 mg  25 mg Oral BID Isis Jorgensen MD   25 mg at 12/04/20 6699    fenofibrate (TRIGLIDE) tablet 160 mg  160 mg Oral Daily Guille Rand MD   160 mg at 12/04/20 3097    vitamin D (CHOLECALCIFEROL) tablet 2,000 Units  2,000 Units Oral Daily Krunal Bhagat MD   2,000 Units at 12/04/20 2206    magnesium oxide (MAG-OX) tablet 400 mg  400 mg Oral Daily Krunal Bhagat MD   400 mg at 12/04/20 6498    ezetimibe (ZETIA) tablet 10 mg  10 mg Oral Nightly Krunal Bhagat MD   10 mg at 12/03/20 2007    montelukast (SINGULAIR) tablet 10 mg  10 mg Oral Nightly Krunal Bhagat MD   10 mg at 12/03/20 2007    raloxifene (EVISTA) tablet 60 mg  60 mg Oral Nightly Krunal Bhagat MD        sodium bicarbonate tablet 1,300 mg  1,300 mg Oral BID Krunal Bhagat MD   1,300 mg at 12/04/20 0923    glucose (GLUTOSE) 40 % oral gel 15 g  15 g Oral PRN Krunal Bhagat MD        dextrose 50 % IV solution  12.5 g Intravenous PRN Krunal Bhagat MD        glucagon (rDNA) injection 1 mg  1 mg Intramuscular PRN Krunal Bhagat MD        dextrose 5 % solution  100 mL/hr Intravenous PRN Krunal Bhagat MD        insulin lispro (HUMALOG) injection vial 0-6 Units  0-6 Units Subcutaneous TID WC Krunal Bhagat MD   1 Units at 12/03/20 1721    insulin lispro (HUMALOG) injection vial 0-3 Units  0-3 Units Subcutaneous Nightly Krunal Bhagat MD   1 Units at 12/03/20 2008    pantoprazole (PROTONIX) injection 40 mg  40 mg Intravenous BID Mikey Weiss MD   40 mg at 12/04/20 1018    And    sodium chloride (PF) 0.9 % injection 10 mL  10 mL Intravenous BID Mikey Weiss MD   10 mL at 12/04/20 0924    sodium chloride flush 0.9 % injection 10 mL  10 mL Intravenous 2 times per day Sandie Dolan MD   10 mL at 12/04/20 0398    sodium chloride flush 0.9 % injection 10 mL  10 mL Intravenous PRN Sandie Dolan MD        acetaminophen (TYLENOL) tablet 650 mg  650 mg Oral Q4H PRN Sandie Kelsi, MD          dextrose         Physical Exam:  BP (!) 155/69   Pulse 78   Temp 96.7 °F (35.9 °C)   Resp 18   Ht 5' 3\" (1.6 m)   Wt 119 INR 1.1 12/02/2020     No results found for: TSH  No components found for: CHLPL  Lab Results   Component Value Date    TRIG 387 (H) 11/10/2020     Lab Results   Component Value Date    HDL 38 11/10/2020     Lab Results   Component Value Date    LDLCALC 106 (H) 11/10/2020       Radiology:  XR CHEST PORTABLE   Final Result   1. Small patchy infiltrate seen within the right lower lobe   2. Questionable patchy infiltrate within the left perihilar region   3. Cardiomegaly. There are no findings of failure            Problem List:  Active Hospital Problems    Diagnosis    Acute blood loss anemia [D62]     Priority: High    Mitral regurgitation [I34.0]     Priority: High    Anemia of chronic kidney failure [N18.9, D63.1]     Priority: Medium    Iron deficiency anemia due to chronic blood loss [D50.0]     Priority: Medium    Stage 3b chronic kidney disease [N18.32]     Priority: Medium    Type 2 diabetes mellitus (HCC) [E11.9]     Priority: Medium    Mobitz type 1 second degree atrioventricular block [I44.1]    Encounter for monitoring antiplatelet therapy [N48.29, Z79.02]    Ischemic cardiomyopathy [I25.5]    Chronic systolic (congestive) heart failure (HCC) [I50.22]    Moderate malnutrition (HCC) [E44.0]    UGIB (upper gastrointestinal bleed) [K92.2]    Old myocardial infarct [I25.2]    Essential hypertension [I10]    Colon polyps [K63.5]           ASSESSMENT/PLAN:      1. GI bleed with recent non-STEMI and circumflex MARLON 2 weeks ago. Negative EGD with plans for colonoscopy tomorrow. Would like to restart baby aspirin and Brilinta today. High risk for acute stent thrombosis with recurrent MI. Hemoglobin stable after transfusion 2 days ago.     2. Ischemic cardiomyopathy and LVEF 35-40%, stable chronic systolic heart failure.     Decrease metoprolol dose because of type I second-degree heart block, and use low-dose Isordil/hydralazine combination rather than ACE/ARB due to chronic kidney disease    3. Type I second-degree heart block: Decrease metoprolol dose     3. Hypertension: Management as above     4. Hyperlipidemia: Restart statin     5.  chronic kidney disease.   Current creatinine 2.6 and was 2.3 last month    Electronically signed by Oval Lesch, MD on 12/4/2020 at 11:06 AM

## 2020-12-04 NOTE — ANESTHESIA PRE PROCEDURE
fluticasone-salmeterol (ADVAIR) 250-50 MCG/DOSE AEPB Inhale 1 puff into the lungs as needed.  Is weaning off as of 2/5/2014    Historical Provider, MD       Current medications:    Current Facility-Administered Medications   Medication Dose Route Frequency Provider Last Rate Last Dose    bisacodyl (DULCOLAX) EC tablet 10 mg  10 mg Oral Once Pradeep Mc MD        [START ON 12/5/2020] metoprolol succinate (TOPROL XL) extended release tablet 12.5 mg  12.5 mg Oral Daily Mey Henriquez MD        aspirin EC tablet 81 mg  81 mg Oral Daily Mey Henriquez MD   81 mg at 12/04/20 1602    ticagrelor (BRILINTA) tablet 90 mg  90 mg Oral BID Mey Henriquez MD        isosorbide dinitrate (ISORDIL) tablet 20 mg  20 mg Oral BID Mey Henriquez MD   20 mg at 12/04/20 5745    hydrALAZINE (APRESOLINE) tablet 25 mg  25 mg Oral BID Mey Henriquez MD   25 mg at 12/04/20 4866    fenofibrate (TRIGLIDE) tablet 160 mg  160 mg Oral Daily Mey Henriquez MD   160 mg at 12/04/20 8241    vitamin D (CHOLECALCIFEROL) tablet 2,000 Units  2,000 Units Oral Daily Mary Zayas MD   2,000 Units at 12/04/20 7409    magnesium oxide (MAG-OX) tablet 400 mg  400 mg Oral Daily Mary Zayas MD   400 mg at 12/04/20 9117    ezetimibe (ZETIA) tablet 10 mg  10 mg Oral Nightly Mary Zayas MD   10 mg at 12/03/20 2007    montelukast (SINGULAIR) tablet 10 mg  10 mg Oral Nightly Mary Zayas MD   10 mg at 12/03/20 2007    raloxifene (EVISTA) tablet 60 mg  60 mg Oral Nightly Mary Zayas MD        sodium bicarbonate tablet 1,300 mg  1,300 mg Oral BID Mary Zayas MD   1,300 mg at 12/04/20 0923    glucose (GLUTOSE) 40 % oral gel 15 g  15 g Oral PRN Mary Zayas MD        dextrose 50 % IV solution  12.5 g Intravenous PRN Mary Zayas MD        glucagon (rDNA) injection 1 mg  1 mg Intramuscular PRN Mary Zayas MD        dextrose 5 % solution  100 mL/hr Intravenous PRN Mary Zayas MD        insulin lispro (HUMALOG) injection vial 0-6 Units  0-6 Units Subcutaneous TID WC Ila Solomon MD   1 Units at 12/04/20 1749    insulin lispro (HUMALOG) injection vial 0-3 Units  0-3 Units Subcutaneous Nightly Ila Solomon MD   1 Units at 12/03/20 2008    pantoprazole (PROTONIX) injection 40 mg  40 mg Intravenous BID Yue Taylor MD   40 mg at 12/04/20 9917    And    sodium chloride (PF) 0.9 % injection 10 mL  10 mL Intravenous BID Yue Taylor MD   10 mL at 12/04/20 0924    sodium chloride flush 0.9 % injection 10 mL  10 mL Intravenous 2 times per day Trace Dia MD   10 mL at 12/04/20 6065    sodium chloride flush 0.9 % injection 10 mL  10 mL Intravenous PRN Trace Dia MD        acetaminophen (TYLENOL) tablet 650 mg  650 mg Oral Q4H PRN Trace Dia MD           Allergies:     Allergies   Allergen Reactions    Aspirin Hives    Statins Other (See Comments)     Muscle weakness    Nsaids Rash       Problem List:    Patient Active Problem List   Diagnosis Code    Colon polyps K63.5    Essential hypertension I10    Type 2 diabetes mellitus ([facility] [address] E11.9    Mitral regurgitation I34.0    Stage 3b chronic kidney disease N18.32    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    UGIB (upper gastrointestinal bleed) K92.2    Encounter for monitoring antiplatelet therapy S90.23, Z79.02    Ischemic cardiomyopathy I25.5    Chronic systolic (congestive) heart failure (HCC) I50.22    Moderate malnutrition (HCC) E44.0    Acute blood loss anemia D62    Anemia of chronic kidney failure N18.9, D63.1    Iron deficiency anemia due to chronic blood loss D50.0    Mobitz type 1 second degree atrioventricular block I44.1       Past Medical History:        Diagnosis Date    Anemia     4/2014 put on iron supplement    Arthritis     Asthma     Bronchial asthma, getting better, going to Dr. Hermann Richards,  weaning off allergy shots and inhalers    Chronic systolic (congestive) heart failure ([facility] [address] 12/3/2020    Colon polyps 2/20/2014    Diabetes ([facility] [address]     Diabetes mellitus ([facility] [address]     am glucose running 130    Environmental allergies     spring/winter    Full dentures     Gastric ulcer 2/20/2014    GERD (gastroesophageal reflux disease)     High cholesterol     History of blood transfusion     Hypertension     124/70, has been good    Osteopenia        Past Surgical History:        Procedure Laterality Date    APPENDECTOMY      CARDIAC CATHETERIZATION  11/11/2020    Dr Abida Chase, OPEN  7696'[address] early    COLONOSCOPY      CORONARY ANGIOPLASTY WITH STENT PLACEMENT  11/11/2020    DR Kirby Mcdonough Resolute [id number]  3.0x 22prox    ENDOSCOPY, COLON, DIAGNOSTIC      KNEE ARTHROCENTESIS      KNEE ARTHROSCOPY Left 1980's    OTHER SURGICAL HISTORY  10/13/2014    antrogade balloon enteroscopy with biopsy of polyp and scleratherapy    TUBAL LIGATION      UPPER GASTROINTESTINAL ENDOSCOPY      UPPER GASTROINTESTINAL ENDOSCOPY  04/18/2014    UPPER GASTROINTESTINAL ENDOSCOPY N/A 12/4/2020    EGD ESOPHAGOGASTRODUODENOSCOPY performed by Vero Mend[facility]ola MD at [facility] History:    Social History     Tobacco Use    Smoking status: Never Smoker    Smokeless tobacco: Never Used   Substance Use Topics    Alcohol use:  Yes     Alcohol/week: 0.0 standard drinks     Comment: socially                                Counseling given: Not Answered      Vital Signs (Current):   Vitals:    12/04/20 0845 12/04/20 0857 12/04/20 0923 12/04/20 1512   BP: (!) 161/76 (!) 152/68 (!) 155/69 (!) 154/67   Pulse: 79 75 78 68   Resp: 21 19 18 18   Temp:  36.7 °C (98.1 °F) 35.9 °C (96.7 °F) 36.6 °C (97.8 °F)   TempSrc:  Temporal     SpO2: 99% 99% 100% 98%   Weight:       Height:                                                  BP Readings from Last 3 Encounters:   12/04/20 (!) 154/67   12/04/20 (!) 142/63   11/12/20 135/77       NPO Status:  pt and nursing staff instructed for pt to be NPO after midnight BMI:   Wt Readings from Last 3 Encounters:   12/02/20 119 lb 12.8 oz (54.3 kg)   11/12/20 129 lb 9.6 oz (58.8 kg)   11/10/20 128 lb 3.2 oz (58.2 kg)     Body mass index is 21.22 kg/m². CBC:   Lab Results   Component Value Date    WBC 6.4 12/02/2020    RBC 2.61 12/02/2020    HGB 8.9 12/04/2020    HCT 29.3 12/04/2020    MCV 87.7 12/02/2020    RDW 17.2 12/02/2020     12/02/2020       CMP:   Lab Results   Component Value Date     12/04/2020    K 3.5 12/04/2020    K 4.3 11/08/2020    CL 97 12/04/2020    CO2 16 12/04/2020    BUN 35 12/04/2020    CREATININE 2.6 12/04/2020    GFRAA 22 12/04/2020    LABGLOM 18 12/04/2020    GLUCOSE 159 12/04/2020    PROT 5.9 12/02/2020    CALCIUM 8.6 12/04/2020    BILITOT 0.3 12/02/2020    ALKPHOS 46 12/02/2020    AST 24 12/02/2020    ALT 9 12/02/2020       POC Tests: No results for input(s): POCGLU, POCNA, POCK, POCCL, POCBUN, POCHEMO, POCHCT in the last 72 hours.     Coags:   Lab Results   Component Value Date    PROTIME 12.8 12/02/2020    INR 1.1 12/02/2020    APTT 28.6 12/02/2020       HCG (If Applicable): No results found for: PREGTESTUR, PREGSERUM, HCG, HCGQUANT     ABGs: No results found for: PHART, PO2ART, RTW3EQT, NKA6CEN, BEART, J2JVSLDR     Type & Screen (If Applicable):  No results found for: LABABO, LABRH    Drug/Infectious Status (If Applicable):  No results found for: HIV, HEPCAB    COVID-19 Screening (If Applicable): No results found for: COVID19     ekg 12/2/20  12/3/2020  9:03 AM - Hunter, Mhy Incoming Ekg Results From [facility]     Component  Value  Ref Range & Units  Status  Collected  Lab    Ventricular Rate  57  BPM  Final  12/02/2020  3:42 PM  [facility]    Atrial Rate  87  BPM  Final  12/02/2020  3:42 PM  [facility]    QRS Duration  102  ms  Final  12/02/2020  3:42 PM  [facility]    Q-T Interval  484  ms  Final  12/02/2020  3:42 PM  [facility]    QTc Calculation (Bazett)  471  ms  Final  12/02/2020  3:42 PM  [facility]    R Axis  -7  degrees  Final  12/02/2020  3:42 PM  [facility]    T Axis  -143  degrees  Final  12/02/2020  3:42 PM  [facility]    Testing Performed By     Lab - Abbreviation  Name  Director  Address  Valid Date Range    360-[facility]  [facility] MUSE  Unknown  Unknown  04/18/16 0721-Present    Narrative & Impression     Sinus rhythm with 2nd degree AV block (Mobitz I) with 2:1 AV conduction  Left ventricular hypertrophy with repolarization abnormality  Cannot rule out Septal infarct , age undetermined  Abnormal ECG  When compared with ECG of 12-NOV-2020 06:30,  Significant changes have occurred  Confirmed by Murali Roberson ([id number]) on 12/3/2020 9:03:34 AM       Echo 11/8/20  Type of Study      TTE procedure:Echo Complete W/Doppler & Color Flow. Procedure Date  Date: 11/08/2020 Start: 01:48 PM     Study Location: Portable  Technical Quality: Adequate visualization     Indications:Mitral regurgitation and Congestive heart failure.     Patient Status: Routine     Height: 63 inches Weight: 124 pounds BSA: 1.58 m^2 BMI: 21.97 kg/m^2     HR: 96 bpm BP: 126/59 mmHg      Findings      Left Ventricle   Left ventricle size is normal.   Severe left ventricular concentric hypertrophy noted. Mid-distal anteroseptal hypo- to akinesis. Ejection fraction is visually estimated at 35-40%. Indeterminate diastolic function. Right Ventricle   Normal right ventricular size. Right ventricle global systolic function is mildly reduced. TAPSE 1.4. Left Atrium   Normal sized left atrium. Interatrial septum appears intact. Right Atrium   Normal right atrium size. Mitral Valve   Mild thickening of the mitral valve leaflets. No mitral valve stenosis present. Mild mitral regurgitation is present. Tricuspid Valve   The tricuspid valve appears structurally normal.   Mild tricuspid regurgitation. RVSP is normal.      Aortic Valve   The aortic valve leaflets were not well visualized.    The aortic valve appears moderately sclerotic. No hemodynamically significant aortic stenosis is present. No evidence of aortic valve regurgitation. Pulmonic Valve   The pulmonic valve was not well visualized. No evidence of pulmonic valve stenosis. No evidence of any pulmonic regurgitation. Pericardial Effusion   No evidence of pericardial effusion. Pleural Effusion   No evidence of pleural effusion. Aorta   Aortic root dimension within normal limits. Conclusions      Signature      ----------------------------------------------------------------   Electronically signed by Damari Elias MD(Interpreting   physician) on 11/09/2020 06:56 AM            Anesthesia Evaluation  Patient summary reviewed and Nursing notes reviewed no history of anesthetic complications:   Airway: Mallampati: II  TM distance: >3 FB   Neck ROM: full  Mouth opening: > = 3 FB Dental:    (+) upper dentures and lower dentures      Pulmonary: breath sounds clear to auscultation  (+) asthma: seasonal asthma,           Patient did not smoke on day of surgery. Cardiovascular:  Exercise tolerance: poor (<4 METS),   (+) hypertension:, past MI: 1-6 months, CABG/stent:, CHF:, TREVINO:, hyperlipidemia      ECG reviewed  Rhythm: regular  Rate: normal           Beta Blocker:  Dose within 24 Hrs         Neuro/Psych:               GI/Hepatic/Renal:   (+) GERD:, PUD, renal disease: CRI,           Endo/Other:    (+) DiabetesType II DM, , blood dyscrasia (HGB 8.7): anemia, arthritis:., .                 Abdominal:       Abdomen: soft. Vascular:                                      Anesthesia Plan      MAC     ASA 3       Induction: intravenous. Anesthetic plan and risks discussed with patient. Use of blood products discussed with patient whom. Plan discussed with CRNA and attending. Mitch Louis RN   12/4/2020      Pt seen, examined, chart reviewed, plan discussed.   Tish Lacey [facility]  12/5/2020  9:31 AM

## 2020-12-04 NOTE — PROGRESS NOTES
GENERAL SURGERY  DAILY PROGRESS NOTE  12/4/2020    Subjective:  No N/V  Was having a BM this morning   Discussed benefits of scope yesterday   Plan for EGD 12/4        Objective:  BP (!) 125/58   Pulse 80   Temp 97 °F (36.1 °C) (Temporal)   Resp 16   Ht 5' 3\" (1.6 m)   Wt 119 lb 12.8 oz (54.3 kg)   SpO2 98%   BMI 21.22 kg/m²     GENERAL:  Laying in bed, cooperative, no apparent distress  HEAD: Normocephalic  LUNGS:  No increased work of breathing, on room air  CARDIOVASCULAR:  RR  ABDOMEN:  Soft, non-tender, non-distended  EXTREMITIES: No edema      Assessment/Plan:  77 y.o. female with anemia and melena, FOBT+, likely due to upper GI bleed    Overall care per primary   PPI BID  EGD today 12/4  Continued NPO and IV fluids  There is no substantial change in her physical status since the time of her pre-anesthesia testing, allergies to drugs and biologics were reviewed on chart and are unchanged. She is fit for the proposed surgical procedure. Sanjay Quintero has no further questions regarding the risks, benefits, nature and alternatives of surgery and wishes to proceed.       Electronically signed by Noemi Lozada DO on 12/4/2020 at 6:30 AM

## 2020-12-04 NOTE — ANESTHESIA POSTPROCEDURE EVALUATION
Department of Anesthesiology  Postprocedure Note    Patient: Royer Billingsley  MRN: [id number]  YOB: 1945  Date of evaluation: 12/4/2020  Time:  9:11 AM     Procedure Summary     Date:  12/04/20 Room / Location:  [facility] 01 / [facility]    Anesthesia Start:  4748 Anesthesia Stop:  5560    Procedure:  EGD ESOPHAGOGASTRODUODENOSCOPY (N/A ) Diagnosis:  (.)    Surgeon:  Leora Joaquin MD Responsible Provider:  Alaina Pennington MD    Anesthesia Type:  MAC ASA Status:  4          Anesthesia Type: MAC    Emilia Phase I: Emilia Score: 10    Emilia Phase II:      Last vitals: Reviewed and per EMR flowsheets.        Anesthesia Post Evaluation    Patient location during evaluation: PACU  Patient participation: complete - patient participated  Level of consciousness: awake and alert  Airway patency: patent  Nausea & Vomiting: no nausea and no vomiting  Complications: no  Cardiovascular status: hemodynamically stable  Respiratory status: acceptable  Hydration status: euvolemic

## 2020-12-04 NOTE — ANESTHESIA PRE PROCEDURE
Department of Anesthesiology  Preprocedure Note       Name:  Cole Lucas   Age:  77 y.o.  :  1945                                          MRN:  [id number]         Date:  2020      Surgeon: Leatha Romero):  Rashida Scott MD    Procedure: Procedure(s):  EGD ESOPHAGOGASTRODUODENOSCOPY    Medications prior to admission:   Prior to Admission medications    Medication Sig Start Date End Date Taking? Authorizing Provider   aspirin 81 MG EC tablet Take 1 tablet by mouth daily 20  Yes Dave Beyer MD   ezetimibe (ZETIA) 10 MG tablet Take 1 tablet by mouth nightly 20  Yes Dave Beyer MD   metoprolol succinate (TOPROL XL) 25 MG extended release tablet Take 1 tablet by mouth daily 20  Yes Dave Beyer MD   sodium bicarbonate 650 MG tablet Take 2 tablets by mouth 2 times daily 20   Dave Beyer MD   nitroGLYCERIN (NITROSTAT) 0.4 MG SL tablet up to max of 3 total doses. If no relief after 1 dose, call 911. 20   Dave Beyer MD   metFORMIN (GLUCOPHAGE) 500 MG tablet Take 3 tablets by mouth 2 times daily (with meals) Resume this on 20 with supper dose. 20   Dave Beyer MD   ticagrelor (BRILINTA) 90 MG TABS tablet Take 1 tablet by mouth 2 times daily 20   Dave Beyer MD   empagliflozin (JARDIANCE) 25 MG tablet Take 25 mg by mouth daily    Historical Provider, MD   raloxifene (EVISTA) 60 MG tablet Take 60 mg by mouth nightly    Historical Provider, MD   magnesium oxide (MAG-OX) 400 MG tablet Take 400 mg by mouth daily. Historical Provider, MD   montelukast (SINGULAIR) 10 MG tablet Take 10 mg by mouth nightly. Historical Provider, MD   glimepiride (AMARYL) 4 MG tablet Take 4 mg by mouth 2 times daily (with meals). Historical Provider, MD   fenofibrate 160 MG tablet Take 160 mg by mouth nightly. Historical Provider, MD   Cholecalciferol (VITAMIN D-3) 5000 UNITS TABS Take 2,000 Units by mouth daily.  Last dose 4/15/14    Historical Provider, MD   fluticasone-salmeterol (ADVAIR) 250-50 MCG/DOSE AEPB Inhale 1 puff into the lungs as needed.  Is weaning off as of 2/5/2014    Historical Provider, MD       Current medications:    Current Facility-Administered Medications   Medication Dose Route Frequency Provider Last Rate Last Dose    metoprolol succinate (TOPROL XL) extended release tablet 25 mg  25 mg Oral Daily Guille Rand MD   25 mg at 12/03/20 0836    isosorbide dinitrate (ISORDIL) tablet 20 mg  20 mg Oral BID Guille Rand MD   20 mg at 12/03/20 2146    hydrALAZINE (APRESOLINE) tablet 25 mg  25 mg Oral BID Guille Rand MD   25 mg at 12/03/20 2007    fenofibrate (TRIGLIDE) tablet 160 mg  160 mg Oral Daily Guille Rand MD   160 mg at 12/03/20 1407    vitamin D (CHOLECALCIFEROL) tablet 2,000 Units  2,000 Units Oral Daily Krunal Bhagat MD   2,000 Units at 12/03/20 1148    magnesium oxide (MAG-OX) tablet 400 mg  400 mg Oral Daily Krunal Bhagat MD   400 mg at 12/03/20 1148    ezetimibe (ZETIA) tablet 10 mg  10 mg Oral Nightly Krunal Bhagat MD   10 mg at 12/03/20 2007    montelukast (SINGULAIR) tablet 10 mg  10 mg Oral Nightly Krunal Bhagat MD   10 mg at 12/03/20 2007    raloxifene (EVISTA) tablet 60 mg  60 mg Oral Nightly Krunal Bhagat MD        sodium bicarbonate tablet 1,300 mg  1,300 mg Oral BID Krunal Bhagat MD   1,300 mg at 12/03/20 2007    glucose (GLUTOSE) 40 % oral gel 15 g  15 g Oral PRN Krunal Bhagat MD        dextrose 50 % IV solution  12.5 g Intravenous PRN Krunal Bhagat MD        glucagon (rDNA) injection 1 mg  1 mg Intramuscular PRN Krunal Bhagat MD        dextrose 5 % solution  100 mL/hr Intravenous PRN Krunal Bhagat MD        insulin lispro (HUMALOG) injection vial 0-6 Units  0-6 Units Subcutaneous TID WC Krunal Bhagat MD   1 Units at 12/03/20 1721    insulin lispro (HUMALOG) injection vial 0-3 Units  0-3 Units Subcutaneous Nightly Krunal Bhagat MD   1 Units at 12/03/20 2008    pantoprazole (PROTONIX) injection 40 mg  40 mg Intravenous BID Adam Gandhi MD   40 mg at 12/03/20 2007    And    sodium chloride (PF) 0.9 % injection 10 mL  10 mL Intravenous BID Adam Gandhi MD   10 mL at 12/03/20 2019    sodium chloride flush 0.9 % injection 10 mL  10 mL Intravenous 2 times per day Elena Louis MD   10 mL at 12/03/20 2008    sodium chloride flush 0.9 % injection 10 mL  10 mL Intravenous PRN Elena Louis MD        acetaminophen (TYLENOL) tablet 650 mg  650 mg Oral Q4H PRN Elena Louis MD           Allergies:     Allergies   Allergen Reactions    Aspirin Hives    Statins Other (See Comments)     Muscle weakness    Nsaids Rash       Problem List:    Patient Active Problem List   Diagnosis Code    Colon polyps K63.5    Essential hypertension I10    Type 2 diabetes mellitus ([facility] [address] E11.9    Mitral regurgitation I34.0    Stage 3b chronic kidney disease N18.32    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    UGIB (upper gastrointestinal bleed) K92.2    Encounter for monitoring antiplatelet therapy Z70.28, Z79.02    Ischemic cardiomyopathy I25.5    Chronic systolic (congestive) heart failure (HCC) I50.22    Moderate malnutrition (HCC) E44.0    Acute blood loss anemia D62    Anemia of chronic kidney failure N18.9, D63.1    Iron deficiency anemia due to chronic blood loss D50.0       Past Medical History:        Diagnosis Date    Anemia     4/2014 put on iron supplement    Arthritis     Asthma     Bronchial asthma, getting better, going to Dr. Ana Luisa Baldwin,  weaning off allergy shots and inhalers    Chronic systolic (congestive) heart failure ([facility] [address] 12/3/2020    Colon polyps 2/20/2014    Diabetes ([facility] [address]     Diabetes mellitus (HCC)     am glucose running 130    Environmental allergies     spring/winter    Full dentures     Gastric ulcer 2/20/2014    GERD (gastroesophageal reflux disease)     High cholesterol     History of blood transfusion     Hypertension     124/70, has been good  Osteopenia        Past Surgical History:        Procedure Laterality Date    APPENDECTOMY      CARDIAC CATHETERIZATION  11/11/2020    Dr Bruce Merritt, OPEN  6803'C early    COLONOSCOPY      CORONARY ANGIOPLASTY WITH STENT PLACEMENT  11/11/2020    DR Ying Meyer Resolute [id number]  3.0x 22prox    ENDOSCOPY, COLON, DIAGNOSTIC      KNEE ARTHROCENTESIS      KNEE ARTHROSCOPY Left 1980's    OTHER SURGICAL HISTORY  10/13/2014    antrogade balloon enteroscopy with biopsy of polyp and scleratherapy    TUBAL LIGATION      UPPER GASTROINTESTINAL ENDOSCOPY      UPPER GASTROINTESTINAL ENDOSCOPY  04/18/2014       Social History:    Social History     Tobacco Use    Smoking status: Never Smoker    Smokeless tobacco: Never Used   Substance Use Topics    Alcohol use: Yes     Alcohol/week: 0.0 standard drinks     Comment: socially                                Counseling given: Not Answered      Vital Signs (Current):   Vitals:    12/03/20 1536 12/03/20 2005 12/03/20 2308 12/04/20 0510   BP: (!) 116/57 (!) 121/58 (!) 117/57 (!) 125/58   Pulse: 85 75 83 80   Resp: 16 16 16 16   Temp: 36.9 °C (98.5 °F) 36.8 °C (98.3 °F) 36.7 °C (98 °F) 36.1 °C (97 °F)   TempSrc:  Temporal Temporal Temporal   SpO2: 95% 98% 97% 98%   Weight:       Height:                                                  BP Readings from Last 3 Encounters:   12/04/20 (!) 125/58   11/12/20 135/77   11/10/20 (!) 118/59       NPO Status:  12/3/20 2200                                                                               BMI:   Wt Readings from Last 3 Encounters:   12/02/20 119 lb 12.8 oz (54.3 kg)   11/12/20 129 lb 9.6 oz (58.8 kg)   11/10/20 128 lb 3.2 oz (58.2 kg)     Body mass index is 21.22 kg/m².     CBC:   Lab Results   Component Value Date    WBC 6.4 12/02/2020    RBC 2.61 12/02/2020    HGB 8.9 12/04/2020    HCT 29.3 12/04/2020    MCV 87.7 12/02/2020    RDW 17.2 12/02/2020     12/02/2020       CMP:   Lab Results valve leaflets. No mitral valve stenosis present. Mild mitral regurgitation is present. Tricuspid Valve   The tricuspid valve appears structurally normal.   Mild tricuspid regurgitation. RVSP is normal.      Aortic Valve   The aortic valve leaflets were not well visualized. The aortic valve appears moderately sclerotic. No hemodynamically significant aortic stenosis is present. No evidence of aortic valve regurgitation. Pulmonic Valve   The pulmonic valve was not well visualized. No evidence of pulmonic valve stenosis. No evidence of any pulmonic regurgitation. Pericardial Effusion   No evidence of pericardial effusion. Pleural Effusion   No evidence of pleural effusion. Aorta   Aortic root dimension within normal limits. Conclusions      Signature      ----------------------------------------------------------------   Electronically signed by Adina Juarez MD(Interpreting   physician) on 11/09/2020 06:56 AM    CXR 12/2/20  1. Small patchy infiltrate seen within the right lower lobe    2. Questionable patchy infiltrate within the left perihilar region    3. Cardiomegaly. [place] Gent[place] are no findings of failure        Anesthesia Evaluation  Patient summary reviewed and Nursing notes reviewed  Airway: Mallampati: I  TM distance: >3 FB   Neck ROM: full  Mouth opening: > = 3 FB Dental:    (+) upper dentures and lower dentures  Comment: Upper and lower dentures    Pulmonary: breath sounds clear to auscultation  (+) asthma: seasonal asthma,                            Cardiovascular:  Exercise tolerance: poor (<4 METS),   (+) hypertension:, valvular problems/murmurs: MR, past MI ( MI 11/2020 ): < 1 month, CHF: systolic, hyperlipidemia    CABG/stent:  2 stents 11/2020.     ECG reviewed  Rhythm: regular  Rate: normal  Echocardiogram reviewed  Stress test reviewed       Beta Blocker:  Dose within 24 Hrs         Neuro/Psych:   Negative Neuro/Psych ROS              GI/Hepatic/Renal:   (+)

## 2020-12-04 NOTE — PROGRESS NOTES
Admit Date: 12/2/2020    Subjective:     Patient states she has not had a bowel movement since being in the hospital.  She does not complain of abdominal pain or nausea. Scheduled Meds:   metoprolol succinate  25 mg Oral Daily    isosorbide dinitrate  20 mg Oral BID    hydrALAZINE  25 mg Oral BID    fenofibrate  160 mg Oral Daily    vitamin D  2,000 Units Oral Daily    magnesium oxide  400 mg Oral Daily    ezetimibe  10 mg Oral Nightly    montelukast  10 mg Oral Nightly    raloxifene  60 mg Oral Nightly    sodium bicarbonate  1,300 mg Oral BID    insulin lispro  0-6 Units Subcutaneous TID WC    insulin lispro  0-3 Units Subcutaneous Nightly    pantoprazole  40 mg Intravenous BID    And    sodium chloride (PF)  10 mL Intravenous BID    sodium chloride flush  10 mL Intravenous 2 times per day     Continuous Infusions:   dextrose       PRN Meds:glucose, dextrose, glucagon (rDNA), dextrose, sodium chloride flush, acetaminophen      Objective:     I/O last 3 completed shifts: In: 240 [P.O.:240]  Out: 150 [Urine:150]  No intake/output data recorded.   BP (!) 125/58   Pulse 80   Temp 97 °F (36.1 °C) (Temporal)   Resp 16   Ht 5' 3\" (1.6 m)   Wt 119 lb 12.8 oz (54.3 kg)   SpO2 98%   BMI 21.22 kg/m²     LUNGS:  No increased work of breathing, good air exchange, clear to auscultation bilaterally, no crackles or wheezing  CARDIOVASCULAR:  Normal apical impulse, regular rate and rhythm, normal S1 and S2, no S3 or S4, and no murmur noted  ABDOMEN:  Flat, soft, non-tender, no HSM, no masses  MUSCULOSKELETAL:  No cyanosis, no edema, no clubbing    Data Review  CBC:   Recent Labs     12/02/20  1535 12/03/20  0456   WBC 6.4  --    HGB 6.6* 9.3*     --      BMP:    Recent Labs     12/02/20  1535   *   K 3.9   CL 96*   CO2 21*   BUN 37*   CREATININE 2.6*   GLUCOSE 157*     Coagulation:   Lab Results   Component Value Date    INR 1.1 12/02/2020    APTT 28.6 12/02/2020     Cardiac markers: No results found for: CKMB, TROPONINT, MYOGLOBIN  Lab Results   Component Value Date    LABPROT 0.1 11/09/2020    LABPROT 0.1 11/09/2020    LABALBU 3.7 12/02/2020     Lab Results   Component Value Date    ALT 9 12/02/2020    AST 24 12/02/2020    ALKPHOS 46 12/02/2020    BILITOT 0.3 12/02/2020         Assessment:     Patient Active Problem List    Diagnosis Date Noted    Acute blood loss anemia 12/03/2020     Priority: High    UGIB (upper gastrointestinal bleed) 12/02/2020     Priority: High    Ischemic cardiomyopathy 12/03/2020     Priority: Medium    Chronic systolic (congestive) heart failure ([facility] [address] 12/03/2020     Priority: Medium    Anemia of chronic kidney failure 12/03/2020     Priority: Medium    Iron deficiency anemia due to chronic blood loss 12/03/2020     Priority: Medium    Old myocardial infarct 11/10/2020     Priority: Medium    Type 2 diabetes mellitus ([facility] [address] 11/08/2020     Priority: Medium    Stage 3b chronic kidney disease 11/08/2020     Priority: Medium    Encounter for monitoring antiplatelet therapy 59/67/0647     Priority: Low    Moderate malnutrition ([facility] [address] 12/03/2020     Priority: Low    Essential hypertension 11/08/2020     Priority: Low    Mitral regurgitation 11/08/2020     Priority: Low    Colon polyps 02/20/2014     Priority: Low         Cardiac monitor showing [place]chebach block    Plan: For EGD today. Cardiologist to advise if pacemaker is indicated. Follow renal function and H&H. Resume Brilinta as soon as possible.

## 2020-12-04 NOTE — OP NOTE
Operative Note: EGD    Manas Rasmussen     DATE OF PROCEDURE: 12/4/2020  SURGEON: Dr. Oziel Law M.D. PREOPERATIVE DIAGNOSES:   Rectal bleeding    POSTOPERATIVE DIAGNOSES:  Moderate gastritis, duodenitis      OPERATION:    EGD esophagogastroduodenoscopy    SPECIMENS:  * No specimens in log *    ANESTHESIA: LMAC    BLOOD LOSS: Minimal    CONSENT AND INDICATIONS:  This is a 77y.o. year old female who is having the above issues. I have discussed with the patient and/or the patient representative the indication, alternatives, and the possible risks and/or complications of the planned procedure and the anesthesia methods. The patient and/or patient representative appear to understand and agree to proceed. OPERATIONS: The patient was placed on the table and sedated by anesthesia. Bite block was placed. A lubricated scope was easily passed into the upper esophagus which looked normal. The distal esophagus looked abnormal: GERD. The gastroesophageal junction was at 35 cm. The scope was passed into the stomach and retroflexed. There was a small sliding hiatal hernia. The scope was passed down toward the pylorus. The antral mucosa all looked abnormal: moderate gastritis. The scope was then passed through the pylorus into the duodenal bulb which looked abnormal: moderate duodenitis, then around to the distal duodenum which looked normal, and the scope was then withdrawn. The patient tolerated the procedure well. PLAN: No upper GI source of bleeding or anemia. Physician Signature: Electronically signed by Dr. Oziel Law M.D. FACS    Send copy of H&P to PCP, Blossom Cotton MD and referring physician, No ref.  provider found

## 2020-12-05 ENCOUNTER — ANESTHESIA (OUTPATIENT)
Dept: ENDOSCOPY | Age: 75
DRG: 393 | End: 2020-12-05
Payer: MEDICARE

## 2020-12-05 VITALS
DIASTOLIC BLOOD PRESSURE: 61 MMHG | RESPIRATION RATE: 18 BRPM | SYSTOLIC BLOOD PRESSURE: 128 MMHG | OXYGEN SATURATION: 100 %

## 2020-12-05 LAB
ANION GAP SERPL CALCULATED.3IONS-SCNC: 13 MMOL/L (ref 7–16)
BUN BLDV-MCNC: 32 MG/DL (ref 8–23)
CALCIUM SERPL-MCNC: 8.4 MG/DL (ref 8.6–10.2)
CHLORIDE BLD-SCNC: 103 MMOL/L (ref 98–107)
CO2: 21 MMOL/L (ref 22–29)
CREAT SERPL-MCNC: 2.7 MG/DL (ref 0.5–1)
GFR AFRICAN AMERICAN: 21
GFR NON-AFRICAN AMERICAN: 17 ML/MIN/1.73
GLUCOSE BLD-MCNC: 133 MG/DL (ref 74–99)
HCT VFR BLD CALC: 28.3 % (ref 34–48)
HEMOGLOBIN: 8.7 G/DL (ref 11.5–15.5)
METER GLUCOSE: 130 MG/DL (ref 74–99)
METER GLUCOSE: 140 MG/DL (ref 74–99)
METER GLUCOSE: 147 MG/DL (ref 74–99)
METER GLUCOSE: 148 MG/DL (ref 74–99)
METER GLUCOSE: 174 MG/DL (ref 74–99)
POTASSIUM SERPL-SCNC: 3.8 MMOL/L (ref 3.5–5)
SODIUM BLD-SCNC: 137 MMOL/L (ref 132–146)

## 2020-12-05 PROCEDURE — 2580000003 HC RX 258: Performed by: FAMILY MEDICINE

## 2020-12-05 PROCEDURE — 0DBL8ZZ EXCISION OF TRANSVERSE COLON, VIA NATURAL OR ARTIFICIAL OPENING ENDOSCOPIC: ICD-10-PCS | Performed by: TRANSPLANT SURGERY

## 2020-12-05 PROCEDURE — 2580000003 HC RX 258: Performed by: NURSE ANESTHETIST, CERTIFIED REGISTERED

## 2020-12-05 PROCEDURE — 85014 HEMATOCRIT: CPT

## 2020-12-05 PROCEDURE — 2580000003 HC RX 258: Performed by: STUDENT IN AN ORGANIZED HEALTH CARE EDUCATION/TRAINING PROGRAM

## 2020-12-05 PROCEDURE — 45380 COLONOSCOPY AND BIOPSY: CPT | Performed by: TRANSPLANT SURGERY

## 2020-12-05 PROCEDURE — 6360000002 HC RX W HCPCS: Performed by: NURSE ANESTHETIST, CERTIFIED REGISTERED

## 2020-12-05 PROCEDURE — 2709999900 HC NON-CHARGEABLE SUPPLY: Performed by: TRANSPLANT SURGERY

## 2020-12-05 PROCEDURE — 36415 COLL VENOUS BLD VENIPUNCTURE: CPT

## 2020-12-05 PROCEDURE — 6370000000 HC RX 637 (ALT 250 FOR IP): Performed by: TRANSPLANT SURGERY

## 2020-12-05 PROCEDURE — 7100000000 HC PACU RECOVERY - FIRST 15 MIN: Performed by: TRANSPLANT SURGERY

## 2020-12-05 PROCEDURE — C9113 INJ PANTOPRAZOLE SODIUM, VIA: HCPCS | Performed by: STUDENT IN AN ORGANIZED HEALTH CARE EDUCATION/TRAINING PROGRAM

## 2020-12-05 PROCEDURE — 6370000000 HC RX 637 (ALT 250 FOR IP): Performed by: INTERNAL MEDICINE

## 2020-12-05 PROCEDURE — 88305 TISSUE EXAM BY PATHOLOGIST: CPT

## 2020-12-05 PROCEDURE — 2140000000 HC CCU INTERMEDIATE R&B

## 2020-12-05 PROCEDURE — 3700000001 HC ADD 15 MINUTES (ANESTHESIA): Performed by: TRANSPLANT SURGERY

## 2020-12-05 PROCEDURE — 2580000003 HC RX 258: Performed by: TRANSPLANT SURGERY

## 2020-12-05 PROCEDURE — C9113 INJ PANTOPRAZOLE SODIUM, VIA: HCPCS | Performed by: TRANSPLANT SURGERY

## 2020-12-05 PROCEDURE — 3700000000 HC ANESTHESIA ATTENDED CARE: Performed by: TRANSPLANT SURGERY

## 2020-12-05 PROCEDURE — 6360000002 HC RX W HCPCS: Performed by: STUDENT IN AN ORGANIZED HEALTH CARE EDUCATION/TRAINING PROGRAM

## 2020-12-05 PROCEDURE — 82962 GLUCOSE BLOOD TEST: CPT

## 2020-12-05 PROCEDURE — 85018 HEMOGLOBIN: CPT

## 2020-12-05 PROCEDURE — 80048 BASIC METABOLIC PNL TOTAL CA: CPT

## 2020-12-05 PROCEDURE — 3609010600 HC COLONOSCOPY POLYPECTOMY SNARE/COLD BIOPSY: Performed by: TRANSPLANT SURGERY

## 2020-12-05 PROCEDURE — 6360000002 HC RX W HCPCS: Performed by: TRANSPLANT SURGERY

## 2020-12-05 PROCEDURE — 7100000001 HC PACU RECOVERY - ADDTL 15 MIN: Performed by: TRANSPLANT SURGERY

## 2020-12-05 RX ORDER — PROPOFOL 10 MG/ML
INJECTION, EMULSION INTRAVENOUS PRN
Status: DISCONTINUED | OUTPATIENT
Start: 2020-12-05 | End: 2020-12-05 | Stop reason: SDUPTHER

## 2020-12-05 RX ORDER — SODIUM CHLORIDE 9 MG/ML
INJECTION, SOLUTION INTRAVENOUS CONTINUOUS PRN
Status: DISCONTINUED | OUTPATIENT
Start: 2020-12-05 | End: 2020-12-05 | Stop reason: SDUPTHER

## 2020-12-05 RX ADMIN — SODIUM BICARBONATE 1300 MG: 650 TABLET ORAL at 20:32

## 2020-12-05 RX ADMIN — TICAGRELOR 90 MG: 90 TABLET ORAL at 20:32

## 2020-12-05 RX ADMIN — MONTELUKAST SODIUM 10 MG: 10 TABLET, FILM COATED ORAL at 20:32

## 2020-12-05 RX ADMIN — HYDRALAZINE HYDROCHLORIDE 25 MG: 25 TABLET, FILM COATED ORAL at 07:45

## 2020-12-05 RX ADMIN — SODIUM CHLORIDE: 9 INJECTION, SOLUTION INTRAVENOUS at 11:33

## 2020-12-05 RX ADMIN — ISOSORBIDE DINITRATE 20 MG: 10 TABLET ORAL at 07:44

## 2020-12-05 RX ADMIN — ASPIRIN 81 MG: 81 TABLET, COATED ORAL at 14:16

## 2020-12-05 RX ADMIN — FENOFIBRATE 160 MG: 160 TABLET ORAL at 14:17

## 2020-12-05 RX ADMIN — MAGNESIUM GLUCONATE 500 MG ORAL TABLET 400 MG: 500 TABLET ORAL at 14:16

## 2020-12-05 RX ADMIN — HYDRALAZINE HYDROCHLORIDE 25 MG: 25 TABLET, FILM COATED ORAL at 20:33

## 2020-12-05 RX ADMIN — PANTOPRAZOLE SODIUM 40 MG: 40 INJECTION, POWDER, FOR SOLUTION INTRAVENOUS at 07:44

## 2020-12-05 RX ADMIN — Medication 10 ML: at 07:46

## 2020-12-05 RX ADMIN — METOPROLOL SUCCINATE 12.5 MG: 25 TABLET, EXTENDED RELEASE ORAL at 07:44

## 2020-12-05 RX ADMIN — SODIUM BICARBONATE 1300 MG: 650 TABLET ORAL at 14:16

## 2020-12-05 RX ADMIN — Medication 10 ML: at 20:33

## 2020-12-05 RX ADMIN — SODIUM CHLORIDE, PRESERVATIVE FREE 10 ML: 5 INJECTION INTRAVENOUS at 08:31

## 2020-12-05 RX ADMIN — EZETIMIBE 10 MG: 10 TABLET ORAL at 20:32

## 2020-12-05 RX ADMIN — PROPOFOL 500 MG: 10 INJECTION, EMULSION INTRAVENOUS at 11:37

## 2020-12-05 RX ADMIN — ISOSORBIDE DINITRATE 20 MG: 10 TABLET ORAL at 20:32

## 2020-12-05 RX ADMIN — SODIUM CHLORIDE, PRESERVATIVE FREE 10 ML: 5 INJECTION INTRAVENOUS at 20:32

## 2020-12-05 RX ADMIN — PANTOPRAZOLE SODIUM 40 MG: 40 INJECTION, POWDER, FOR SOLUTION INTRAVENOUS at 20:32

## 2020-12-05 RX ADMIN — Medication 2000 UNITS: at 14:16

## 2020-12-05 ASSESSMENT — PAIN SCALES - GENERAL
PAINLEVEL_OUTOF10: 0

## 2020-12-05 NOTE — PROGRESS NOTES
(1.6 m)   Wt 119 lb 12.8 oz (54.3 kg)   SpO2 96%   BMI 21.22 kg/m²   24HR INTAKE/OUTPUT:      Intake/Output Summary (Last 24 hours) at 12/5/2020 0444  Last data filed at 12/4/2020 2106  Gross per 24 hour   Intake 480 ml   Output --   Net 480 ml     LUNGS:  No increased work of breathing, good air exchange, clear to auscultation bilaterally, no crackles or wheezing  CARDIOVASCULAR:  Normal apical impulse, regular rate and rhythm, normal S1 and S2, no S3 or S4, and no murmur noted  Data    CBC with Differential:    Lab Results   Component Value Date    WBC 6.4 12/02/2020    RBC 2.61 12/02/2020    HGB 8.7 12/05/2020    HCT 28.3 12/05/2020     12/02/2020    MCV 87.7 12/02/2020    MCH 25.3 12/02/2020    MCHC 28.8 12/02/2020    RDW 17.2 12/02/2020    LYMPHOPCT 15.4 11/07/2020    MONOPCT 7.6 11/07/2020    BASOPCT 0.5 11/07/2020    MONOSABS 0.50 11/07/2020    LYMPHSABS 1.01 11/07/2020    EOSABS 0.08 11/07/2020    BASOSABS 0.03 11/07/2020     CMP:    Lab Results   Component Value Date     12/04/2020    K 3.5 12/04/2020    K 4.3 11/08/2020    CL 97 12/04/2020    CO2 16 12/04/2020    BUN 35 12/04/2020    CREATININE 2.6 12/04/2020    GFRAA 22 12/04/2020    LABGLOM 18 12/04/2020    GLUCOSE 159 12/04/2020    PROT 5.9 12/02/2020    LABALBU 3.7 12/02/2020    CALCIUM 8.6 12/04/2020    BILITOT 0.3 12/02/2020    ALKPHOS 46 12/02/2020    AST 24 12/02/2020    ALT 9 12/02/2020     PT/INR:    Lab Results   Component Value Date    PROTIME 12.8 12/02/2020    INR 1.1 12/02/2020       ASSESSMENT AND PLAN      Principal Problem:    Acute blood loss anemia  Plan: Colonoscopy today  Active Problems:  Gastrointestinal bleed most likely from arteriovenous malformations  Coronary artery disease with recent stent placement and high risk of thrombosis  Chronic kidney disease stage IIIb  Diabetes mellitus          Electronically signed by Lyndon Mayo MD on 12/5/2020 at 7:26 AM

## 2020-12-05 NOTE — PROGRESS NOTES
GENERAL SURGERY  DAILY PROGRESS NOTE  12/5/2020    Subjective:  No acute events overnight. EGD yesterday did not reveal any source of upper GI bleed. Plan is for colonoscopy today. She tolerated her prep and is clear.         Objective:  BP (!) 120/91   Pulse 80   Temp 98.6 °F (37 °C) (Axillary)   Resp 16   Ht 5' 3\" (1.6 m)   Wt 119 lb 12.8 oz (54.3 kg)   SpO2 96%   BMI 21.22 kg/m²     GENERAL:  Laying in bed, cooperative, no apparent distress  HEAD: Normocephalic  LUNGS:  No increased work of breathing, on room air  CARDIOVASCULAR:  RR  ABDOMEN:  Soft, non-tender, non-distended  EXTREMITIES: No edema      Assessment/Plan:  77 y.o. female with anemia and melena, FOBT+, EGD with no source of bleed    Overall care per primary   PPI BID  Colonoscopy today  NPO until after procedure     Electronically signed by Piotr Arellano MD on 12/5/2020 at 8:08 AM

## 2020-12-05 NOTE — PROGRESS NOTES
[facility] CARDIOLOGY PROGRESS NOTE  [facility]        Subjective: Seeing patient with significant anemia hemoglobin around 6 on admission on dual antiplatelet therapy outpatient aspirin and Brilinta with recent non-ST elevation myocardial infarction and 2 stents to the circumflex about   ago. LVEF 35 to 40% and chronic kidney disease. Received 2 units of blood and hemoglobin is in the 9 range now and today hemoglobin 8.7. Denies any melena overnight. No chest pain or distress reported. No indication of abdominal pain. Laying flat in bed without respiratory distress. She reports a prior history of AV malformation. Underwent upper endoscopy yesterday that did not show any significant abnormality. Objective: Labs chart medications and telemetry are reviewed. Patient Vitals for the past 24 hrs:   BP Temp Temp src Pulse Resp SpO2   12/05/20 0744 (!) 120/91 98.4 °F (36.9 °C) -- 80 18 98 %   12/05/20 0008 (!) 107/53 98.6 °F (37 °C) Axillary 88 16 96 %   12/04/20 2051 (!) 145/70 98.4 °F (36.9 °C) Temporal 83 16 96 %   12/04/20 1512 (!) 154/67 97.8 °F (36.6 °C) -- 68 18 98 %         Intake/Output Summary (Last 24 hours) at 12/5/2020 0940  Last data filed at 12/4/2020 2106  Gross per 24 hour   Intake 480 ml   Output --   Net 480 ml       Wt Readings from Last 3 Encounters:   12/02/20 119 lb 12.8 oz (54.3 kg)   11/12/20 129 lb 9.6 oz (58.8 kg)   11/10/20 128 lb 3.2 oz (58.2 kg)       Telemetry: I personally reviewed and shows second-degree type I Sarah Cassette this morning with variable  4 to 3 and 3-2 block without long pauses documented.     Current meds: Scheduled Meds:   metoprolol succinate  12.5 mg Oral Daily    aspirin  81 mg Oral Daily    ticagrelor  90 mg Oral BID    isosorbide dinitrate  20 mg Oral BID    hydrALAZINE  25 mg Oral BID    fenofibrate  160 mg Oral Daily    vitamin D  2,000 Units Oral Daily    magnesium oxide  400 mg Oral Daily    ezetimibe  10 mg Oral Nightly    montelukast 10 mg Oral Nightly    raloxifene  60 mg Oral Nightly    sodium bicarbonate  1,300 mg Oral BID    insulin lispro  0-6 Units Subcutaneous TID WC    pantoprazole  40 mg Intravenous BID    And    sodium chloride (PF)  10 mL Intravenous BID    sodium chloride flush  10 mL Intravenous 2 times per day     Continuous Infusions:   dextrose       PRN Meds:.glucose, dextrose, glucagon (rDNA), dextrose, sodium chloride flush, acetaminophen    Allergies: Aspirin; Statins; and Nsaids      Labs:   Recent Labs     12/02/20  1535 12/03/20  0456 12/04/20  0615 12/05/20  0600   WBC 6.4  --   --   --    HGB 6.6* 9.3* 8.9* 8.7*   HCT 22.9* 29.4* 29.3* 28.3*   MCV 87.7  --   --   --      --   --   --        Labs:   Recent Labs     12/02/20  1535 12/04/20  0615 12/05/20  0600   * 132 137   K 3.9 3.5 3.8   CL 96* 97* 103   CO2 21* 16* 21*   BUN 37* 35* 32*   CREATININE 2.6* 2.6* 2.7*       Labs:   Recent Labs     12/02/20  1535   TROPONINI <0.01       Labs: No results for input(s): BNP in the last 72 hours. Labs: No results for input(s): CHOL, HDL, TRIG in the last 72 hours. Invalid input(s): Sharrie Oppenheim    Labs:   Recent Labs     12/02/20  1535 12/02/20  1610   PROT 5.9*  --    INR  --  1.1       Review of systems: No reported significant weight gain or weight loss. no dysuria or frequency, no dizziness, falls or trauma, no change in bowel or bladder habits, no hematochezia, hemoptysis or hematuria. No fevers, chills, nausea or vomiting reported. No significant wheezing or sputum production. No headache or visual changes. The remainder of the 10 review of systems otherwise negative. Exam      General: Patient comfortable in no distress and currently denies any chest pain. HEENT: Face symmetrical and no apparent cranial nerve deficit. Neck: No jugular venous distention, carotid bruit or thyromegaly. Lungs: Clear bilaterally without rales, wheezes or dullness.       Cardiac: Regular rate and rhythm, no S3, S4, no rub or gallop. Abdomen: No rebound or guarding, no hepatosplenomegaly. Extremities: Without significant clubbing , cyanosis, or edema. Neuro:  No focal motor or sensory deficit apparent. Skin: No petechiae, no significant bruising. Assessment: See plan below        Plan: #1 GI bleed and anemia and no recurrent melena per patient and restarted aspirin 81 mg daily yesterday morning and Brilinta 90 mg twice a day with recent 2 stents to the circumflex. To undergo colonoscopy today. #2 second-degree type I AV block when awake this morning so we will stop metoprolol together. Avoid AV blocking agents. #3 coronary artery disease and medical management at this time but no beta-blocker. Continue dual antiplatelet therapy aspirin and Brilinta, with chronic kidney disease avoid ACE and ARB. #4 chronic kidney disease and creatinine 2.6 yesterday up to 2.7 today. Avoid dye load and nephrotoxic medications. Creatinine was 2.0 about 4 weeks ago. #5 moderate LV systolic dysfunction and currently no overt CHF. No ACE or ARB and afterload reduction with hydralazine and nitrates. Advance activity as tolerated. May be able to discharge to home later today or tomorrow from cardiology viewpoint and plan outpatient follow-up [facility] cardiology in 2 weeks.         Electronically signed by Amanuel Hayes MD on 12/5/2020 at 9:40 AM

## 2020-12-05 NOTE — OP NOTE
PROCEDURE NOTE    DATE OF PROCEDURE: 12/5/2020    SURGEON: Negin Hamilton MD    ASSISTANT: None    PREOPERATIVE DIAGNOSIS: Diagnostic colonoscopy for acute blood loss anemia    POSTOPERATIVE DIAGNOSIS: Same, pandiverticulosis, 3mm transverse colon polyp, no evidence of bleeding or old blood    OPERATION: Total colonoscopy to the cecum with cold forcep polypectomy    ANESTHESIA: Local monitored anesthesia. ESTIMATED BLOOD LOSS (ml): less than 50     COMPLICATIONS: None    SPECIMENS:  Was Obtained:     HISTORY: The patient is a 77y.o. year old female with history of above preop diagnosis. I recommended colonoscopy with possible biopsy or polypectomy and I explained the risk, benefits, expected outcome, and alternatives to the procedure. Risks included but are not limited to bleeding, infection, respiratory distress, hypotension, and perforation of the colon. The patient understands and is in agreement. PROCEDURE: The patient was given IV conscious sedation per anesthesia. The patient was given supplemental oxygen by nasal cannula. The colonoscope was inserted per rectum and advanced under direct vision to the cecum with difficulty - tortuous colon, pediatric scope used. The prep was good so exam was adequate. FINDINGS:  Cecum/Ascending colon: diverticular disease    Transverse colon: diverticular disease and 3mm transverse colon polyp that was removed with cold forceps    Descending/Sigmoid colon: tortuous and diverticular disease    Rectum/Anus: examined in normal and retroflexed positions and only Grade 3 hemorrhoids were seen    The colon was decompressed and the scope was removed. The withdraw time was approximately 12 minutes. The patient tolerated the procedure well. ASSESSMENT/PLAN:   1. Await pathology - no evidence of bleeding, general diet  2.  Recommend follow up colonoscopy in 7 years    Electronically signed by Negin Hamilton MD on 12/5/20 at 12:21 PM EST

## 2020-12-05 NOTE — ANESTHESIA POSTPROCEDURE EVALUATION
Department of Anesthesiology  Postprocedure Note    Patient: Brandi Sanchez  MRN: [id number]  YOB: 1945  Date of evaluation: 12/5/2020  Time:  4:21 PM     Procedure Summary     Date:  12/05/20 Room / Location:  [address] [place] [address] / [facility]    Anesthesia Start:  1133 Anesthesia Stop:  8098    Procedure:  COLONOSCOPY POLYPECTOMY SNARE/COLD BIOPSY (N/A ) Diagnosis:  (.)    Surgeon:  Elise Michael MD Responsible Provider:  Agata Mitchell MD    Anesthesia Type:  MAC ASA Status:  3          Anesthesia Type: MAC    Emilia Phase I: Emilia Score: 10    Emilia Phase II:      Last vitals: Reviewed and per EMR flowsheets.        Anesthesia Post Evaluation    Patient location during evaluation: PACU  Patient participation: complete - patient participated  Level of consciousness: awake  Airway patency: patent  Nausea & Vomiting: no nausea and no vomiting  Complications: no  Cardiovascular status: hemodynamically stable  Respiratory status: acceptable  Hydration status: stable

## 2020-12-06 VITALS
OXYGEN SATURATION: 99 % | HEART RATE: 82 BPM | RESPIRATION RATE: 16 BRPM | HEIGHT: 63 IN | TEMPERATURE: 98.4 F | WEIGHT: 119.8 LBS | DIASTOLIC BLOOD PRESSURE: 68 MMHG | SYSTOLIC BLOOD PRESSURE: 149 MMHG | BODY MASS INDEX: 21.23 KG/M2

## 2020-12-06 LAB
ANION GAP SERPL CALCULATED.3IONS-SCNC: 10 MMOL/L (ref 7–16)
BUN BLDV-MCNC: 31 MG/DL (ref 8–23)
CALCIUM SERPL-MCNC: 8.7 MG/DL (ref 8.6–10.2)
CHLORIDE BLD-SCNC: 99 MMOL/L (ref 98–107)
CO2: 24 MMOL/L (ref 22–29)
CREAT SERPL-MCNC: 2.8 MG/DL (ref 0.5–1)
GFR AFRICAN AMERICAN: 20
GFR NON-AFRICAN AMERICAN: 16 ML/MIN/1.73
GLUCOSE BLD-MCNC: 111 MG/DL (ref 74–99)
HCT VFR BLD CALC: 30.6 % (ref 34–48)
HEMOGLOBIN: 9.3 G/DL (ref 11.5–15.5)
METER GLUCOSE: 137 MG/DL (ref 74–99)
POTASSIUM SERPL-SCNC: 4 MMOL/L (ref 3.5–5)
SODIUM BLD-SCNC: 133 MMOL/L (ref 132–146)

## 2020-12-06 PROCEDURE — 6370000000 HC RX 637 (ALT 250 FOR IP): Performed by: TRANSPLANT SURGERY

## 2020-12-06 PROCEDURE — 85018 HEMOGLOBIN: CPT

## 2020-12-06 PROCEDURE — 2580000003 HC RX 258: Performed by: TRANSPLANT SURGERY

## 2020-12-06 PROCEDURE — 82962 GLUCOSE BLOOD TEST: CPT

## 2020-12-06 PROCEDURE — 36415 COLL VENOUS BLD VENIPUNCTURE: CPT

## 2020-12-06 PROCEDURE — 85014 HEMATOCRIT: CPT

## 2020-12-06 PROCEDURE — 80048 BASIC METABOLIC PNL TOTAL CA: CPT

## 2020-12-06 RX ORDER — HYDRALAZINE HYDROCHLORIDE 25 MG/1
25 TABLET, FILM COATED ORAL 2 TIMES DAILY
Qty: 90 TABLET | Refills: 3 | Status: SHIPPED | OUTPATIENT
Start: 2020-12-06

## 2020-12-06 RX ORDER — ISOSORBIDE DINITRATE 20 MG/1
20 TABLET ORAL 2 TIMES DAILY
Qty: 90 TABLET | Refills: 3 | Status: SHIPPED | OUTPATIENT
Start: 2020-12-06 | End: 2021-01-22

## 2020-12-06 RX ORDER — ISOSORBIDE DINITRATE 20 MG/1
20 TABLET ORAL 2 TIMES DAILY
Qty: 90 TABLET | Refills: 3
Start: 2020-12-06 | End: 2020-12-06

## 2020-12-06 RX ORDER — HYDRALAZINE HYDROCHLORIDE 25 MG/1
25 TABLET, FILM COATED ORAL 2 TIMES DAILY
Qty: 90 TABLET | Refills: 3 | Status: SHIPPED | OUTPATIENT
Start: 2020-12-06 | End: 2020-12-06

## 2020-12-06 RX ORDER — ISOSORBIDE DINITRATE 20 MG/1
20 TABLET ORAL 2 TIMES DAILY
Qty: 90 TABLET | Refills: 3 | Status: SHIPPED | OUTPATIENT
Start: 2020-12-06 | End: 2020-12-06

## 2020-12-06 RX ORDER — HYDRALAZINE HYDROCHLORIDE 25 MG/1
25 TABLET, FILM COATED ORAL 2 TIMES DAILY
Qty: 90 TABLET | Refills: 3
Start: 2020-12-06 | End: 2020-12-06

## 2020-12-06 RX ADMIN — SODIUM BICARBONATE 1300 MG: 650 TABLET ORAL at 08:17

## 2020-12-06 RX ADMIN — FENOFIBRATE 160 MG: 160 TABLET ORAL at 08:17

## 2020-12-06 RX ADMIN — ISOSORBIDE DINITRATE 20 MG: 10 TABLET ORAL at 08:17

## 2020-12-06 RX ADMIN — Medication 10 ML: at 08:17

## 2020-12-06 RX ADMIN — HYDRALAZINE HYDROCHLORIDE 25 MG: 25 TABLET, FILM COATED ORAL at 08:17

## 2020-12-06 RX ADMIN — ASPIRIN 81 MG: 81 TABLET, COATED ORAL at 08:17

## 2020-12-06 RX ADMIN — Medication 2000 UNITS: at 08:18

## 2020-12-06 RX ADMIN — MAGNESIUM GLUCONATE 500 MG ORAL TABLET 400 MG: 500 TABLET ORAL at 08:18

## 2020-12-06 RX ADMIN — TICAGRELOR 90 MG: 90 TABLET ORAL at 08:16

## 2020-12-06 ASSESSMENT — PAIN SCALES - GENERAL: PAINLEVEL_OUTOF10: 0

## 2020-12-06 NOTE — PLAN OF CARE
Problem: Falls - Risk of:  Goal: Will remain free from falls  Description: Will remain free from falls  12/6/2020 0749 by Wilfrid Flores RN  Outcome: Met This Shift  12/5/2020 5645 by Wilfrid Flores RN  Outcome: Met This Shift

## 2020-12-06 NOTE — PLAN OF CARE
Problem: Falls - Risk of:  Goal: Will remain free from falls  Description: Will remain free from falls  12/6/2020 0749 by Martina Bro RN  Outcome: Completed  12/6/2020 0749 by Martina Bro RN  Outcome: Met This Shift  12/5/2020 2307 by Martina Bro RN  Outcome: Met This Shift  Goal: Absence of physical injury  Description: Absence of physical injury  Outcome: Completed

## 2020-12-06 NOTE — DISCHARGE SUMMARY
Discharge Summary     Patient ID:  Lemuel Lara  [id number]  70 y.o. 1945 female  Alaina Mason MD        Admit date: 12/2/2020    Discharge date and time:  12/6/2020  7:43 AM      Activity level: Walking around  Disposition: To home  Condition on Discharge: Fair    Admit Diagnoses: Gastrointestinal hemorrhage    Discharge Diagnoses: Blood loss anemia most likely secondary to hemorrhoids.   Coronary artery disease diabetes mellitus chronic kidney disease stage IIIb    Consults:  IP CONSULT TO GENERAL SURGERY  IP CONSULT TO GENERAL SURGERY  IP CONSULT TO INTERNAL MEDICINE  IP CONSULT TO CARDIOLOGY    Procedures: Endoscopy and colonoscopy not demonstrating any bleeding    Hospital Course: 52-year-old woman recently had a non-STEMI was placed on aspirin and Brilinta after 2 stents placed she has had previous arteriovenous malformations was found to have a hemoglobin of 6.6 admitted to the hospital.  She seemed to stabilize and despite endoscopy and colonoscopy which did show hemorrhoids she is stabilized with the aspirin and Brilinta she was encouraged to talk with her primary care about newer cholesterol medications as well as will need close follow-up on aspirin and Brilinta as far as her blood count goes she will be discharged home today      LABS:  Recent Labs     12/04/20  0615 12/05/20  0600    137   K 3.5 3.8   CL 97* 103   CO2 16* 21*   BUN 35* 32*   CREATININE 2.6* 2.7*   GLUCOSE 159* 133*   CALCIUM 8.6 8.4*       Recent Labs     12/04/20  0615 12/05/20  0600   HGB 8.9* 8.7*   HCT 29.3* 28.3*       Recent Labs     12/06/20  0655   GLUMET 137*         Patient Instructions:   Current Discharge Medication List      START taking these medications    Details   isosorbide dinitrate (ISORDIL) 20 MG tablet Take 1 tablet by mouth 2 times daily  Qty: 90 tablet, Refills: 3      hydrALAZINE (APRESOLINE) 25 MG tablet Take 1 tablet by mouth 2 times daily  Qty: 90 tablet, Refills: 3         CONTINUE these medications which have NOT CHANGED    Details   aspirin 81 MG EC tablet Take 1 tablet by mouth daily  Qty: 30 tablet, Refills: 3      ezetimibe (ZETIA) 10 MG tablet Take 1 tablet by mouth nightly  Qty: 30 tablet, Refills: 3      metoprolol succinate (TOPROL XL) 25 MG extended release tablet Take 1 tablet by mouth daily  Qty: 30 tablet, Refills: 3      sodium bicarbonate 650 MG tablet Take 2 tablets by mouth 2 times daily  Qty: 60 tablet, Refills: 5      nitroGLYCERIN (NITROSTAT) 0.4 MG SL tablet up to max of 3 total doses. If no relief after 1 dose, call 911. Qty: 25 tablet, Refills: 3      metFORMIN (GLUCOPHAGE) 500 MG tablet Take 3 tablets by mouth 2 times daily (with meals) Resume this on 11/13/20 with supper dose. Qty: 60 tablet, Refills: 3      ticagrelor (BRILINTA) 90 MG TABS tablet Take 1 tablet by mouth 2 times daily  Qty: 60 tablet, Refills: 5      empagliflozin (JARDIANCE) 25 MG tablet Take 25 mg by mouth daily      raloxifene (EVISTA) 60 MG tablet Take 60 mg by mouth nightly      magnesium oxide (MAG-OX) 400 MG tablet Take 400 mg by mouth daily. montelukast (SINGULAIR) 10 MG tablet Take 10 mg by mouth nightly. fenofibrate 160 MG tablet Take 160 mg by mouth nightly. Cholecalciferol (VITAMIN D-3) 5000 UNITS TABS Take 2,000 Units by mouth daily. Last dose 4/15/14      fluticasone-salmeterol (ADVAIR) 250-50 MCG/DOSE AEPB Inhale 1 puff into the lungs as needed.  Is weaning off as of 2/5/2014         STOP taking these medications       glimepiride (AMARYL) 4 MG tablet Comments:   Reason for Stopping:                   Signed:  Electronically signed by Alex Earl MD on 12/6/2020 at 7:44 AM

## 2020-12-06 NOTE — PROGRESS NOTES
Continuous Infusions:   dextrose       PRN Meds:.glucose, dextrose, glucagon (rDNA), dextrose, sodium chloride flush, acetaminophen    Allergies: Aspirin; Statins; and Nsaids      Labs:   Recent Labs     12/04/20  0615 12/05/20  0600 12/06/20  0631   HGB 8.9* 8.7* 9.3*   HCT 29.3* 28.3* 30.6*       Labs:   Recent Labs     12/04/20  0615 12/05/20  0600 12/06/20  0631    137 133   K 3.5 3.8 4.0   CL 97* 103 99   CO2 16* 21* 24   BUN 35* 32* 31*   CREATININE 2.6* 2.7* 2.8*       Labs: No results for input(s): CKTOTAL, CKMB, CKMBINDEX, TROPONINI in the last 72 hours. Labs: No results for input(s): BNP in the last 72 hours. Labs: No results for input(s): CHOL, HDL, TRIG in the last 72 hours. Invalid input(s): CHOLHDLR, LDLCALCU    Labs: No results for input(s): PROT, INR in the last 72 hours. Review of systems: No reported significant weight gain or weight loss. no dysuria or frequency, no dizziness, falls or trauma, no change in bowel or bladder habits, no hematochezia, hemoptysis or hematuria. No fevers, chills, nausea or vomiting reported. No significant wheezing or sputum production. No headache or visual changes. The remainder of the 10 review of systems otherwise negative. Exam      General: Patient comfortable in no distress and currently denies any chest pain. HEENT: Face symmetrical and no apparent cranial nerve deficit. Neck: No jugular venous distention, carotid bruit or thyromegaly. Lungs: Clear bilaterally without rales, wheezes or dullness. Cardiac: Regular rate and rhythm, no S3, S4, no rub or gallop. Abdomen: No rebound or guarding, no hepatosplenomegaly. Extremities: Without significant clubbing , cyanosis, or edema. Neuro:  No focal motor or sensory deficit apparent. Skin: No petechiae, no significant bruising. Assessment: See plan below        Plan: #1 GI bleed has now resolved and stabilized and today hemoglobin 9.3. Restarted on dual antiplatelet therapy aspirin and Brilinta and will reevaluate in the office in a couple of weeks time. #2 chronic CAD status post non-ST elevation myocardial infarction and 2 stents to the circumflex November 11, 2020. Continue above medical regimen. #3 moderate LV systolic dysfunction without overt CHF at this time. Afterload reduction with hydralazine and nitrates. #4 chronic kidney disease and creatinine today 2.8 up from 2.7 yesterday and 2.6 the day before. Would avoid dehydrating her. Baseline creatinine a month ago was around 2-2.3. #5 hypertension and blood pressure under good control. Should be seen back in my office in 2 to 4 weeks at [facility] cardiology.         Electronically signed by Wyatt Welch MD on 12/6/2020 at 2:22 PM

## 2020-12-06 NOTE — PROGRESS NOTES
[facility] SURGERY  DAILY PROGRESS NOTE  12/6/2020    Harrison covering for [facility]    Subjective:  No acute events overnight. EGD did not reveal any source of upper GI bleed. Colonoscopy with pan-diverticulosis. One polyp removed. No active hemorrhage identified. Patient states she feels good today. She is tolerating her diet. No more bloody bowel movements. Denies nausea or vomiting. Cardiology has already restarted her blood thinners. Objective:  BP (!) 149/68   Pulse 82   Temp 98.4 °F (36.9 °C) (Temporal)   Resp 16   Ht 5' 3\" (1.6 m)   Wt 119 lb 12.8 oz (54.3 kg)   SpO2 99%   BMI 21.22 kg/m²     GENERAL:  Laying in bed, cooperative, no apparent distress  HEAD: Normocephalic  LUNGS:  No increased work of breathing, on room air  CARDIOVASCULAR:  RR  ABDOMEN:  Soft, non-tender, non-distended  EXTREMITIES: No edema      Assessment/Plan:  77 y.o. female with anemia and melena, FOBT+, EGD with no source of bleed.  Colonoscopy with pandiverticulosis and single polypectomy, no active hemorrhage identified    -Overall care per primary   -PPI BID  -Patient is okay for discharge from general surgery perspective    Electronically signed by Brendan Munoz DO on 12/6/2020 at 10:16 AM     Agree with above  Okay to follow up with Dr. Daniela Casillas    Electronically signed by Shukri Mcclain MD on 12/6/2020 at 4:19 PM

## 2020-12-07 ENCOUNTER — CARE COORDINATION (OUTPATIENT)
Dept: CASE MANAGEMENT | Age: 75
End: 2020-12-07

## 2020-12-07 NOTE — CARE COORDINATION
[facility] Transitions Initial Follow Up Call    Call within 2 business days of discharge: Yes    Patient: Agus Packer Patient : 1945   MRN: [id number]  Reason for Admission: UGIB  Discharge Date: 20 RARS: Readmission Risk Score: 20      Last Discharge [address] [address]       Complaint Diagnosis Description Type Department Provider    20 GI Bleeding; Shortness of Breath UGIB (upper gastrointestinal bleed) . .. ED to Hosp-Admission (Discharged) (ADMITTED) DIXIE Lee MD; Vicente Robin MD... Spoke with: No one    Facility: [facility]    First attempt to reach the patient for initial BPCI [phone]) Care Transition call post hospital discharge. Message left with CTN's contact information requesting return phone call. Follow Up  No future appointments.     Arielle Cordero RN

## 2020-12-08 ENCOUNTER — CARE COORDINATION (OUTPATIENT)
Dept: CASE MANAGEMENT | Age: 75
End: 2020-12-08

## 2020-12-08 NOTE — CARE COORDINATION
D[facility]el 45 Transitions Initial Follow Up Call    Call within 2 business days of discharge: Yes    Patient: Sanjay Quintero Patient : 1945   MRN: [id number]  Reason for Admission: Acute blood loss anemia  Discharge Date: 20 RARS: Readmission Risk Score: 20      Last Discharge [facility]       Complaint Diagnosis Description Type Department Provider    20 GI Bleeding; Shortness of Breath UGIB (upper gastrointestinal bleed) . .. ED to Hosp-Admission (Discharged) (ADMITTED) DIXIE Lee MD; Marco Antonio Pacheco MD... Spoke with: Sanjay Quintero, patient    Facility: [facility]      Challenges to be reviewed by the provider   Additional needs identified to be addressed with provider No  none         Method of communication with provider : none    Was this a readmission? Yes  Patient stated reason for admission: bloody stool  Patients top risk factors for readmission: medical condition    Care Transition Nurse (CTN) contacted the patient by telephone to perform post hospital discharge assessment. Verified name and  with patient as identifiers. Provided introduction to self, and explanation of the CTN role. CTN reviewed discharge instructions, medical action plan and red flags with patient who verbalized understanding. Patient given an opportunity to ask questions and does not have any further questions or concerns at this time. Were discharge instructions available to patient? Yes. Reviewed appropriate site of care based on symptoms and resources available to patient including: PCP, Specialist and When to call 911. The patient agrees to contact the PCP office for questions related to their healthcare. Medication reconciliation was performed with patient, who verbalizes understanding of administration of home medications. Advised obtaining a 90-day supply of all daily and as-needed medications. Discussed follow-up appointments.  If no appointment was previously scheduled, appointment scheduling offered: No. Is follow up appointment scheduled within 7 days of discharge? Patient awaiting return call from Dr. Skyla Thakur office to schedule follow up appt  Non-[facility] follow up appointment(s): Patient awaiting return call from Dr. Skyla Thakur office to schedule follow up appt. Patient reports follow up with Dr. Munir More on 12/7/20    Plan for follow-up call in 7-10 days based on severity of symptoms and risk factors. Plan for next call: symptom management-Shortness of breath, fatigue, bleeding, self management-DM and HF, follow up appointment-scheduled follow up with Dr. Juan Fajardo. and medication management-medication changes or questions. CTN provided contact information for future needs. Non-face-to-face services provided:  Scheduled appointment with PCP-Patient awaiting return call from Dr. Skyla Thakur office to schedule follow up appt  Scheduled appointment with Marlin Grimes had follow up with Dr. Munir More on 12/7/20. Patient reports she was told no need to follow up with Dr. Michael Bryan; awaiting call regarding polyp from surgeon's office. Obtained and reviewed discharge summary and/or continuity of care documents    Care Transitions 24 Hour Call    Do you have any ongoing symptoms?:  No  Do you have a copy of your discharge instructions?:  Yes  Do you have all of your prescriptions and are they filled?:  Yes  Have you been contacted by a [id number] [employer] Pharmacist?:  No  Have you scheduled your follow up appointment?:  No  Were you discharged with any Home Care or Post Acute Services:  No  Do you feel like you have everything you need to keep you well at home?:  Yes  Care Transitions Interventions  No Identified Needs       Spoke with patient for initial BPCI care transition call post hospital discharge. Patient is agreeable to follow up post discharge from the hospital. Med review completed. 1111f not entered. Due to Brilinta and Aspirin, bleeding precautions reviewed with patient.   Patient

## 2020-12-09 ENCOUNTER — APPOINTMENT (OUTPATIENT)
Dept: GENERAL RADIOLOGY | Age: 75
DRG: 812 | End: 2020-12-09
Payer: MEDICARE

## 2020-12-09 ENCOUNTER — HOSPITAL ENCOUNTER (INPATIENT)
Age: 75
LOS: 2 days | Discharge: HOME | DRG: 812 | End: 2020-12-11
Attending: EMERGENCY MEDICINE | Admitting: FAMILY MEDICINE
Payer: MEDICARE

## 2020-12-09 LAB
ABO/RH: NORMAL
ALBUMIN SERPL-MCNC: 3.6 G/DL (ref 3.5–5.2)
ALP BLD-CCNC: 43 U/L (ref 35–104)
ALT SERPL-CCNC: 10 U/L (ref 0–32)
ANION GAP SERPL CALCULATED.3IONS-SCNC: 10 MMOL/L (ref 7–16)
ANISOCYTOSIS: ABNORMAL
ANTIBODY SCREEN: NORMAL
AST SERPL-CCNC: 21 U/L (ref 0–31)
BASOPHILS ABSOLUTE: 0.01 E9/L (ref 0–0.2)
BASOPHILS RELATIVE PERCENT: 0.2 % (ref 0–2)
BILIRUB SERPL-MCNC: 0.4 MG/DL (ref 0–1.2)
BLOOD BANK DISPENSE STATUS: NORMAL
BLOOD BANK PRODUCT CODE: NORMAL
BPU ID: NORMAL
BUN BLDV-MCNC: 46 MG/DL (ref 8–23)
CALCIUM SERPL-MCNC: 9.6 MG/DL (ref 8.6–10.2)
CHLORIDE BLD-SCNC: 101 MMOL/L (ref 98–107)
CO2: 25 MMOL/L (ref 22–29)
CREAT SERPL-MCNC: 2.7 MG/DL (ref 0.5–1)
DESCRIPTION BLOOD BANK: NORMAL
EKG ATRIAL RATE: 57 BPM
EKG P AXIS: 59 DEGREES
EKG P-R INTERVAL: 196 MS
EKG Q-T INTERVAL: 436 MS
EKG QRS DURATION: 86 MS
EKG QTC CALCULATION (BAZETT): 424 MS
EKG R AXIS: -9 DEGREES
EKG T AXIS: -141 DEGREES
EKG VENTRICULAR RATE: 57 BPM
EOSINOPHILS ABSOLUTE: 0.01 E9/L (ref 0.05–0.5)
EOSINOPHILS RELATIVE PERCENT: 0.2 % (ref 0–6)
GFR AFRICAN AMERICAN: 21
GFR NON-AFRICAN AMERICAN: 17 ML/MIN/1.73
GLUCOSE BLD-MCNC: 234 MG/DL (ref 74–99)
HCT VFR BLD CALC: 23.8 % (ref 34–48)
HCT VFR BLD CALC: 28.9 % (ref 34–48)
HEMOGLOBIN: 7.2 G/DL (ref 11.5–15.5)
HEMOGLOBIN: 9.2 G/DL (ref 11.5–15.5)
IMMATURE GRANULOCYTES #: 0.05 E9/L
IMMATURE GRANULOCYTES %: 0.8 % (ref 0–5)
INR BLD: 1.1
LYMPHOCYTES ABSOLUTE: 0.46 E9/L (ref 1.5–4)
LYMPHOCYTES RELATIVE PERCENT: 7.5 % (ref 20–42)
MCH RBC QN AUTO: 26.1 PG (ref 26–35)
MCHC RBC AUTO-ENTMCNC: 30.3 % (ref 32–34.5)
MCV RBC AUTO: 86.2 FL (ref 80–99.9)
MONOCYTES ABSOLUTE: 0.44 E9/L (ref 0.1–0.95)
MONOCYTES RELATIVE PERCENT: 7.2 % (ref 2–12)
NEUTROPHILS ABSOLUTE: 5.14 E9/L (ref 1.8–7.3)
NEUTROPHILS RELATIVE PERCENT: 84.1 % (ref 43–80)
OVALOCYTES: ABNORMAL
PDW BLD-RTO: 16.2 FL (ref 11.5–15)
PLATELET # BLD: 270 E9/L (ref 130–450)
PMV BLD AUTO: 10.1 FL (ref 7–12)
POIKILOCYTES: ABNORMAL
POLYCHROMASIA: ABNORMAL
POTASSIUM REFLEX MAGNESIUM: 3.7 MMOL/L (ref 3.5–5)
PRO-BNP: ABNORMAL PG/ML (ref 0–450)
PROTHROMBIN TIME: 12.8 SEC (ref 9.3–12.4)
RBC # BLD: 2.76 E12/L (ref 3.5–5.5)
SODIUM BLD-SCNC: 136 MMOL/L (ref 132–146)
TOTAL PROTEIN: 5.9 G/DL (ref 6.4–8.3)
TROPONIN: <0.01 NG/ML (ref 0–0.03)
WBC # BLD: 6.1 E9/L (ref 4.5–11.5)

## 2020-12-09 PROCEDURE — 71045 X-RAY EXAM CHEST 1 VIEW: CPT

## 2020-12-09 PROCEDURE — 2060000000 HC ICU INTERMEDIATE R&B

## 2020-12-09 PROCEDURE — 83880 ASSAY OF NATRIURETIC PEPTIDE: CPT

## 2020-12-09 PROCEDURE — 2580000003 HC RX 258: Performed by: STUDENT IN AN ORGANIZED HEALTH CARE EDUCATION/TRAINING PROGRAM

## 2020-12-09 PROCEDURE — P9016 RBC LEUKOCYTES REDUCED: HCPCS

## 2020-12-09 PROCEDURE — 6370000000 HC RX 637 (ALT 250 FOR IP): Performed by: FAMILY MEDICINE

## 2020-12-09 PROCEDURE — 36415 COLL VENOUS BLD VENIPUNCTURE: CPT

## 2020-12-09 PROCEDURE — 36430 TRANSFUSION BLD/BLD COMPNT: CPT

## 2020-12-09 PROCEDURE — 85014 HEMATOCRIT: CPT

## 2020-12-09 PROCEDURE — 86850 RBC ANTIBODY SCREEN: CPT

## 2020-12-09 PROCEDURE — 2580000003 HC RX 258: Performed by: FAMILY MEDICINE

## 2020-12-09 PROCEDURE — 86900 BLOOD TYPING SEROLOGIC ABO: CPT

## 2020-12-09 PROCEDURE — 86901 BLOOD TYPING SEROLOGIC RH(D): CPT

## 2020-12-09 PROCEDURE — 80053 COMPREHEN METABOLIC PANEL: CPT

## 2020-12-09 PROCEDURE — 85610 PROTHROMBIN TIME: CPT

## 2020-12-09 PROCEDURE — 93005 ELECTROCARDIOGRAM TRACING: CPT | Performed by: EMERGENCY MEDICINE

## 2020-12-09 PROCEDURE — 99284 EMERGENCY DEPT VISIT MOD MDM: CPT

## 2020-12-09 PROCEDURE — 86923 COMPATIBILITY TEST ELECTRIC: CPT

## 2020-12-09 PROCEDURE — 84484 ASSAY OF TROPONIN QUANT: CPT

## 2020-12-09 PROCEDURE — 85025 COMPLETE CBC W/AUTO DIFF WBC: CPT

## 2020-12-09 PROCEDURE — 93010 ELECTROCARDIOGRAM REPORT: CPT | Performed by: INTERNAL MEDICINE

## 2020-12-09 PROCEDURE — 6360000002 HC RX W HCPCS: Performed by: STUDENT IN AN ORGANIZED HEALTH CARE EDUCATION/TRAINING PROGRAM

## 2020-12-09 PROCEDURE — 85018 HEMOGLOBIN: CPT

## 2020-12-09 RX ORDER — ARFORMOTEROL TARTRATE 15 UG/2ML
15 SOLUTION RESPIRATORY (INHALATION) 2 TIMES DAILY
Status: DISCONTINUED | OUTPATIENT
Start: 2020-12-09 | End: 2020-12-11 | Stop reason: HOSPADM

## 2020-12-09 RX ORDER — PANTOPRAZOLE SODIUM 40 MG/1
40 TABLET, DELAYED RELEASE ORAL
Status: DISCONTINUED | OUTPATIENT
Start: 2020-12-10 | End: 2020-12-11 | Stop reason: HOSPADM

## 2020-12-09 RX ORDER — MONTELUKAST SODIUM 10 MG/1
10 TABLET ORAL NIGHTLY
Status: DISCONTINUED | OUTPATIENT
Start: 2020-12-09 | End: 2020-12-11 | Stop reason: HOSPADM

## 2020-12-09 RX ORDER — PANTOPRAZOLE SODIUM 40 MG/10ML
80 INJECTION, POWDER, LYOPHILIZED, FOR SOLUTION INTRAVENOUS ONCE
Status: DISCONTINUED | OUTPATIENT
Start: 2020-12-09 | End: 2020-12-09

## 2020-12-09 RX ORDER — ACETAMINOPHEN 650 MG/1
650 SUPPOSITORY RECTAL EVERY 6 HOURS PRN
Status: DISCONTINUED | OUTPATIENT
Start: 2020-12-09 | End: 2020-12-11 | Stop reason: HOSPADM

## 2020-12-09 RX ORDER — DIPHENHYDRAMINE HYDROCHLORIDE 50 MG/ML
25 INJECTION INTRAMUSCULAR; INTRAVENOUS ONCE
Status: COMPLETED | OUTPATIENT
Start: 2020-12-09 | End: 2020-12-09

## 2020-12-09 RX ORDER — SODIUM BICARBONATE 650 MG/1
1300 TABLET ORAL 2 TIMES DAILY
Status: DISCONTINUED | OUTPATIENT
Start: 2020-12-09 | End: 2020-12-11 | Stop reason: HOSPADM

## 2020-12-09 RX ORDER — NITROGLYCERIN 0.4 MG/1
0.4 TABLET SUBLINGUAL EVERY 5 MIN PRN
Status: DISCONTINUED | OUTPATIENT
Start: 2020-12-09 | End: 2020-12-11 | Stop reason: HOSPADM

## 2020-12-09 RX ORDER — FENOFIBRATE 160 MG/1
160 TABLET ORAL DAILY
Status: DISCONTINUED | OUTPATIENT
Start: 2020-12-10 | End: 2020-12-11 | Stop reason: HOSPADM

## 2020-12-09 RX ORDER — EZETIMIBE 10 MG/1
10 TABLET ORAL NIGHTLY
Status: DISCONTINUED | OUTPATIENT
Start: 2020-12-09 | End: 2020-12-11 | Stop reason: HOSPADM

## 2020-12-09 RX ORDER — HYDRALAZINE HYDROCHLORIDE 25 MG/1
25 TABLET, FILM COATED ORAL 2 TIMES DAILY
Status: DISCONTINUED | OUTPATIENT
Start: 2020-12-09 | End: 2020-12-11 | Stop reason: HOSPADM

## 2020-12-09 RX ORDER — ONDANSETRON 2 MG/ML
4 INJECTION INTRAMUSCULAR; INTRAVENOUS EVERY 6 HOURS PRN
Status: DISCONTINUED | OUTPATIENT
Start: 2020-12-09 | End: 2020-12-11 | Stop reason: HOSPADM

## 2020-12-09 RX ORDER — PROMETHAZINE HYDROCHLORIDE 25 MG/1
12.5 TABLET ORAL EVERY 6 HOURS PRN
Status: DISCONTINUED | OUTPATIENT
Start: 2020-12-09 | End: 2020-12-11 | Stop reason: HOSPADM

## 2020-12-09 RX ORDER — ACETAMINOPHEN 325 MG/1
650 TABLET ORAL EVERY 6 HOURS PRN
Status: DISCONTINUED | OUTPATIENT
Start: 2020-12-09 | End: 2020-12-11 | Stop reason: HOSPADM

## 2020-12-09 RX ORDER — BUDESONIDE AND FORMOTEROL FUMARATE DIHYDRATE 160; 4.5 UG/1; UG/1
2 AEROSOL RESPIRATORY (INHALATION) 2 TIMES DAILY
Status: DISCONTINUED | OUTPATIENT
Start: 2020-12-09 | End: 2020-12-09 | Stop reason: CLARIF

## 2020-12-09 RX ORDER — 0.9 % SODIUM CHLORIDE 0.9 %
20 INTRAVENOUS SOLUTION INTRAVENOUS ONCE
Status: COMPLETED | OUTPATIENT
Start: 2020-12-09 | End: 2020-12-09

## 2020-12-09 RX ORDER — ISOSORBIDE DINITRATE 10 MG/1
20 TABLET ORAL 2 TIMES DAILY
Status: DISCONTINUED | OUTPATIENT
Start: 2020-12-09 | End: 2020-12-11 | Stop reason: HOSPADM

## 2020-12-09 RX ORDER — POLYETHYLENE GLYCOL 3350 17 G/17G
17 POWDER, FOR SOLUTION ORAL DAILY PRN
Status: DISCONTINUED | OUTPATIENT
Start: 2020-12-09 | End: 2020-12-11 | Stop reason: HOSPADM

## 2020-12-09 RX ORDER — SODIUM CHLORIDE 0.9 % (FLUSH) 0.9 %
10 SYRINGE (ML) INJECTION EVERY 12 HOURS SCHEDULED
Status: DISCONTINUED | OUTPATIENT
Start: 2020-12-09 | End: 2020-12-11 | Stop reason: HOSPADM

## 2020-12-09 RX ORDER — SODIUM CHLORIDE 0.9 % (FLUSH) 0.9 %
10 SYRINGE (ML) INJECTION PRN
Status: DISCONTINUED | OUTPATIENT
Start: 2020-12-09 | End: 2020-12-11 | Stop reason: HOSPADM

## 2020-12-09 RX ORDER — BUDESONIDE 0.5 MG/2ML
0.5 INHALANT ORAL 2 TIMES DAILY
Status: DISCONTINUED | OUTPATIENT
Start: 2020-12-09 | End: 2020-12-11 | Stop reason: HOSPADM

## 2020-12-09 RX ADMIN — DIPHENHYDRAMINE HYDROCHLORIDE 25 MG: 50 INJECTION, SOLUTION INTRAMUSCULAR; INTRAVENOUS at 20:09

## 2020-12-09 RX ADMIN — ISOSORBIDE DINITRATE 20 MG: 10 TABLET ORAL at 22:49

## 2020-12-09 RX ADMIN — SODIUM BICARBONATE 1300 MG: 650 TABLET ORAL at 22:49

## 2020-12-09 RX ADMIN — EZETIMIBE 10 MG: 10 TABLET ORAL at 22:50

## 2020-12-09 RX ADMIN — MONTELUKAST SODIUM 10 MG: 10 TABLET, FILM COATED ORAL at 22:49

## 2020-12-09 RX ADMIN — SODIUM CHLORIDE 20 ML: 9 INJECTION, SOLUTION INTRAVENOUS at 20:09

## 2020-12-09 RX ADMIN — HYDRALAZINE HYDROCHLORIDE 25 MG: 25 TABLET, FILM COATED ORAL at 22:49

## 2020-12-09 RX ADMIN — SODIUM CHLORIDE, PRESERVATIVE FREE 10 ML: 5 INJECTION INTRAVENOUS at 22:50

## 2020-12-09 ASSESSMENT — ENCOUNTER SYMPTOMS
SORE THROAT: 0
COUGH: 0
BACK PAIN: 0
BLOOD IN STOOL: 1
ABDOMINAL PAIN: 0
NAUSEA: 0
VOMITING: 0
RHINORRHEA: 0
SHORTNESS OF BREATH: 1
DIARRHEA: 0

## 2020-12-09 ASSESSMENT — PAIN SCALES - GENERAL: PAINLEVEL_OUTOF10: 0

## 2020-12-09 NOTE — ED PROVIDER NOTES
FIRST PROVIDER CONTACT ASSESSMENT NOTE      Department of Emergency Medicine   ED  First Provider Note   12/9/20  12:49 PM EST    Chief Complaint: Shortness of Breath (SOB, hx heart cath and two stents placed last month. on brilinta) and Rectal Bleeding      History of Present Illness:    Don Davis is a 77 y.o. female who presents to the ED by private car for bright red rectal bleeding and shortness of breath. The patient had a recent MI. She is now on Brilinta. History of AVMs with recurrent GI bleeding. States that she has had ongoing blood in her stool and is concerned about being anemic. Focused Screening Exam:  Constitutional:  Alert, appears stated age and is in no distress.    Pallor      *ALLERGIES*     Aspirin; Statins; and Nsaids     ED Triage Vitals [12/09/20 1246]   BP Temp Temp Source Pulse Resp SpO2 Height Weight   (!) 148/71 96 °F (35.6 °C) Tympanic 58 16 100 % 5' 3\" (1.6 m) 119 lb (54 kg)        Initial Plan of Care:  Initiate Treatment-Testing, Proceed toTreatment Area When Bed Available for ED Attending/MLP to Continue Care    -----------------END OF FIRST PROVIDER CONTACT ASSESSMENT NOTE--------------  Electronically signed by Storm Ramon PA-C   DD: 12/9/20       Storm Ramon PA-C  12/09/20 8767

## 2020-12-09 NOTE — ED PROVIDER NOTES
Karrie La is a 77 y.o. female with a PMHx significant for AVM malformations, CAD, Mitral regurgitation, HTN who presents for evaluation of shortness of breath and blood in stool, beginning prior to arrival.  The complaint has been persistent, moderate in severity, and worsened by ambulation. The patient states that she had an MI one month ago and had two stents placed. She has been on aspirin and Brilinta for this. Patient has history of AVMs in her stomach, causing chronic anemia and will have some bleeding. She goes to the [facility] for iron infusions. Since starting the Brilinta she continues to have bleeding. She was discharged on 12/06. Follows with Dr. Lanny Blankenship (cardiology). Patient notes that she had been constipated and then had a bowel movement today that was dark and tarry. She notes worsening shortness of breath with difficulty ambulating     The history is provided by the patient and medical records. Review of Systems   Constitutional: Positive for fatigue. Negative for chills, diaphoresis and fever. HENT: Negative for congestion, rhinorrhea and sore throat. Eyes: Negative for visual disturbance. Respiratory: Positive for shortness of breath. Negative for cough. Cardiovascular: Negative for chest pain. Gastrointestinal: Positive for blood in stool (dark stools). Negative for abdominal pain, diarrhea, nausea and vomiting. Genitourinary: Negative for dysuria and urgency. Musculoskeletal: Negative for back pain. Skin: Negative for rash. Neurological: Negative for dizziness, light-headedness, numbness and headaches. Physical Exam  Vitals signs and nursing note reviewed. Constitutional:       General: She is not in acute distress. Appearance: She is well-developed. She is not ill-appearing. HENT:      Head: Normocephalic and atraumatic. Eyes:      General:         Right eye: No discharge. Left eye: No discharge.       Conjunctiva/sclera: Conjunctivae normal.   Neck:      Musculoskeletal: Normal range of motion and neck supple. Cardiovascular:      Rate and Rhythm: Normal rate and regular rhythm. Heart sounds: Normal heart sounds. No murmur. Pulmonary:      Effort: Pulmonary effort is normal. No respiratory distress. Breath sounds: Normal breath sounds. No stridor. No wheezing. Abdominal:      General: There is no distension. Palpations: Abdomen is soft. There is no mass. Tenderness: There is no abdominal tenderness. Hernia: No hernia is present. Genitourinary:     Comments: Declined rectal exam    Musculoskeletal: Normal range of motion. General: No deformity. Skin:     General: Skin is warm. Coloration: Skin is not jaundiced or pale. Neurological:      General: No focal deficit present. Mental Status: She is alert and oriented to person, place, and time. Cranial Nerves: No cranial nerve deficit. Coordination: Coordination normal.          Procedures     [facility]     ED Course as of Dec 09 2211   Wed Dec 09, 2020   1634 Discussed with patient need for rectal exam to check for blood. She is refusing at this time stating this has been on going for a while now and it was positive the last time she was in here. [BB]   [id number] Spoke with Dr. Sapna Whitley, she states that she will provide consult. [BB]   [id number] Spoke with Dr. Chuy Corral, he states he will admit the patient. [BB]   2211 EKG: This EKG is signed and interpreted by me. Rate: 57  Rhythm: Sinus  Interpretation: sinus bradycardia w/ PAC  Comparison: stable as compared to patient's most recent EKG 12/02/2020        [BB]      ED Course User Index  [BB] DO Harris Ramsay Dora presents to the ED for evaluation of dark tarry stools. Patient only on anticoagulation, history of AVM malformation and recent anemia. Hemoglobin was 7.2 but patient is symptomatic notes dyspnea on exertion when she was walking into the ER. Case was discussed with general surgery who provide consult. Case was discussed with PCP will admit. 1 unit ordered to transfuse. Of note patient started having some itchiness and discomfort during transfusion and was given Benadryl. Patient requires continued workup and management of their symptoms and will be admitted to the hospital for further evaluation and treatment.      --------------------------------------------- PAST HISTORY ---------------------------------------------  Past Medical History:  has a past medical history of Anemia, Arthritis, Asthma, Chronic systolic (congestive) heart failure ([facility] [address] Colon polyps, Diabetes ([facility] [address] Diabetes mellitus ([facility] [address] Environmental allergies, Full dentures, Gastric ulcer, GERD (gastroesophageal reflux disease), High cholesterol, History of blood transfusion, Hypertension, and Osteopenia. Past Surgical History:  has a past surgical history that includes Knee Arthrocentesis; Knee arthroscopy (Left, 1980's); Cholecystectomy, open (1980's early); Tubal ligation; Colonoscopy; Upper gastrointestinal endoscopy; Endoscopy, colon, diagnostic; Upper gastrointestinal endoscopy (04/18/2014); Appendectomy; other surgical history (10/13/2014); Cardiac catheterization (11/11/2020); Coronary angioplasty with stent (11/11/2020); Upper gastrointestinal endoscopy (N/A, 12/4/2020); and Colonoscopy (N/A, 12/5/2020). Social History:  reports that she has never smoked. She has never used smokeless tobacco. She reports current alcohol use. She reports that she does not use drugs. Family History: family history includes Cancer in her father; Heart Disease in her father; Stroke in her mother. The patients home medications have been reviewed.     Allergies: Aspirin; Statins; and Nsaids    -------------------------------------------------- RESULTS -------------------------------------------------    LABS:  Results for orders placed or performed during the hospital encounter of [facility] with Dr. Salbador Frias. Discussed case. They will admit the patient. This patient's ED course included: a personal history and physicial examination, re-evaluation prior to disposition, multiple bedside re-evaluations, IV medications, cardiac monitoring, continuous pulse oximetry and complex medical decision making and emergency management    This patient has remained hemodynamically stable during their ED course. Diagnosis:  1. Acute blood loss anemia    2. History of arteriovenous malformation (AVM)    3. TREVINO (dyspnea on exertion)        Disposition:  Patient's disposition: Admit to telemetry  Patient's condition is stable.                Augustina Smith DO  Resident  12/09/20 8219

## 2020-12-10 LAB
HCT VFR BLD CALC: 25.4 % (ref 34–48)
HCT VFR BLD CALC: 27.2 % (ref 34–48)
HEMOGLOBIN: 8 G/DL (ref 11.5–15.5)
HEMOGLOBIN: 8.6 G/DL (ref 11.5–15.5)
METER GLUCOSE: 122 MG/DL (ref 74–99)
METER GLUCOSE: 178 MG/DL (ref 74–99)
METER GLUCOSE: 289 MG/DL (ref 74–99)

## 2020-12-10 PROCEDURE — 99223 1ST HOSP IP/OBS HIGH 75: CPT | Performed by: SURGERY

## 2020-12-10 PROCEDURE — 6370000000 HC RX 637 (ALT 250 FOR IP): Performed by: FAMILY MEDICINE

## 2020-12-10 PROCEDURE — 82962 GLUCOSE BLOOD TEST: CPT

## 2020-12-10 PROCEDURE — 85014 HEMATOCRIT: CPT

## 2020-12-10 PROCEDURE — 85018 HEMOGLOBIN: CPT

## 2020-12-10 PROCEDURE — 2580000003 HC RX 258: Performed by: FAMILY MEDICINE

## 2020-12-10 PROCEDURE — 36415 COLL VENOUS BLD VENIPUNCTURE: CPT

## 2020-12-10 PROCEDURE — 2060000000 HC ICU INTERMEDIATE R&B

## 2020-12-10 RX ADMIN — EZETIMIBE 10 MG: 10 TABLET ORAL at 20:12

## 2020-12-10 RX ADMIN — SODIUM CHLORIDE, PRESERVATIVE FREE 10 ML: 5 INJECTION INTRAVENOUS at 08:17

## 2020-12-10 RX ADMIN — Medication 400 MG: at 08:19

## 2020-12-10 RX ADMIN — MONTELUKAST SODIUM 10 MG: 10 TABLET, FILM COATED ORAL at 20:11

## 2020-12-10 RX ADMIN — ISOSORBIDE DINITRATE 20 MG: 10 TABLET ORAL at 20:11

## 2020-12-10 RX ADMIN — METFORMIN HYDROCHLORIDE 1000 MG: 1000 TABLET ORAL at 08:17

## 2020-12-10 RX ADMIN — PANTOPRAZOLE SODIUM 40 MG: 40 TABLET, DELAYED RELEASE ORAL at 06:40

## 2020-12-10 RX ADMIN — METFORMIN HYDROCHLORIDE 1000 MG: 1000 TABLET ORAL at 16:37

## 2020-12-10 RX ADMIN — HYDRALAZINE HYDROCHLORIDE 25 MG: 25 TABLET, FILM COATED ORAL at 20:12

## 2020-12-10 RX ADMIN — SODIUM CHLORIDE, PRESERVATIVE FREE 10 ML: 5 INJECTION INTRAVENOUS at 20:13

## 2020-12-10 RX ADMIN — ISOSORBIDE DINITRATE 20 MG: 10 TABLET ORAL at 08:17

## 2020-12-10 RX ADMIN — SODIUM BICARBONATE 1300 MG: 650 TABLET ORAL at 08:17

## 2020-12-10 RX ADMIN — HYDRALAZINE HYDROCHLORIDE 25 MG: 25 TABLET, FILM COATED ORAL at 08:17

## 2020-12-10 RX ADMIN — FENOFIBRATE 160 MG: 160 TABLET ORAL at 08:17

## 2020-12-10 ASSESSMENT — PAIN SCALES - GENERAL
PAINLEVEL_OUTOF10: 0

## 2020-12-10 NOTE — PLAN OF CARE
Problem: Falls - Risk of:  Goal: Will remain free from falls  Description: Will remain free from falls  12/10/2020 1435 by Lisa Hoyos RN  Outcome: Ongoing  12/10/2020 0314 by Gustavo Naqvi RN  Outcome: Met This Shift     Problem: Falls - Risk of:  Goal: Absence of physical injury  Description: Absence of physical injury  12/10/2020 1435 by Lisa Hoyos RN  Outcome: Ongoing  12/10/2020 0314 by Gustavo Naqvi RN  Outcome: Met This Shift

## 2020-12-10 NOTE — PROGRESS NOTES
9:18 AM  Consult placed to Blade in Dr. Krystyna Wheeler office.   Rakel Tello, [facility]ORTENSIA  12/10/2020

## 2020-12-10 NOTE — CARE COORDINATION
Introduced my self and provided explanation of CM role to patient. Patient is awake, alert, and aware of current diagnosis and treatment plan including Gen surg and GI consults, serial lab monitoring. Patient resides at home alone and functions independently. She denies use of any assistive device. She plans on a return to home. Patient verbalizes no concerns and identifies no areas to focus on nor barriers to discharge. She is anxious to discharge \"early\" tomorrow. Will follow along with  and assist with discharge planning as necessary. Explained ELOS of 24 hours; patient voiced understanding and agreement. Jaleel Hendricks.  Dirk, MSN, RN  [facility] Case Management  [phone]

## 2020-12-10 NOTE — CONSULTS
Gastroenterology Consult Note   CHELA Morocho NP-C with Edgar Piper M.D. Consult Note        Date of Service: 12/10/2020  Reason for Consult: GIB with hx of small bowel AVMs  Requesting Physician: Dr. Ramon Garcia:  Dark tarry stools    History Obtained From:  Patient and EMR    HISTORY OF PRESENT ILLNESS:       Dimitri Rose is a 77 y.o. 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 Patient had a recent MI with heart cath and two stents placed 11/2020 and is taking Brilinta. Patient states she has a history of small bowel bleeds and enteroscopy with cauterization of 27 AVMs by [facility], reportedly at [address]. Patient recently admitted to [facility] 2 weeks ago for GI bleed, was transfused with two units of PRBCs. Colonoscopy revealed a polyp and diverticulosis but no source of bleeding. EGD showed gastritis and duodenitis but no source of bleeding. Patient states she has chronic anemia due to her history of AVMs and CKD and has gone to the [facility] in the past for iron infusions. Pt reports a yesterday 12/09/2020 she was \"feeling funny\" and had been constipated for 3 days. She was able to have a bowel movement and described it as dark and tarry. Patient reports normally she has a bowel movement everyday, describing it as soft and brown. Patient was transfused with one unit of PRBCs. Patient denies wt loss, abdominal pain, fever, chills, SOB, nausea, vomiting, hematochezia and hematemesis. Admission labs hemoglobin 7.2, hematocrit 23.8, RBC 2.76, PT 12.8, total protein 5.9, BUN 46, creatinine 2.7. EGD 12/04/2020 with Dr. Juan Mcgee- The distal esophagus looked abnormal: GERD. There was a small sliding hiatal hernia.  The antral mucosa all looked abnormal: moderate allergies     spring/winter    Full dentures     Gastric ulcer 2/20/2014    GERD (gastroesophageal reflux disease)     High cholesterol     History of blood transfusion     Hypertension     124/70, has been good    Osteopenia      Past Surgical History:        Procedure Laterality Date    APPENDECTOMY      CARDIAC CATHETERIZATION  11/11/2020    Dr Kaushik Issa, OPEN  2574'K early    COLONOSCOPY      COLONOSCOPY N/A 12/5/2020    COLONOSCOPY POLYPECTOMY SNARE/COLD BIOPSY performed by Elise Michael MD at [address] [address]  11/11/2020    DR Edwards Lax Resolute [id number]  3.0x 22prox    ENDOSCOPY, COLON, DIAGNOSTIC      KNEE ARTHROCENTESIS      KNEE ARTHROSCOPY Left 1980's    OTHER SURGICAL HISTORY  10/13/2014    antrogade balloon enteroscopy with biopsy of polyp and scleratherapy    TUBAL LIGATION      UPPER GASTROINTESTINAL ENDOSCOPY      UPPER GASTROINTESTINAL ENDOSCOPY  04/18/2014    UPPER GASTROINTESTINAL ENDOSCOPY N/A 12/4/2020    EGD ESOPHAGOGASTRODUODENOSCOPY performed by China Chi MD at [address] [place] [address]     Current Medications:    Current Facility-Administered Medications: empagliflozin (JARDIANCE) tablet TABS 25 mg, 25 mg, Oral, Daily  ezetimibe (ZETIA) tablet 10 mg, 10 mg, Oral, Nightly  fenofibrate (TRIGLIDE) tablet 160 mg, 160 mg, Oral, Daily  hydrALAZINE (APRESOLINE) tablet 25 mg, 25 mg, Oral, BID  isosorbide dinitrate (ISORDIL) tablet 20 mg, 20 mg, Oral, BID  magnesium oxide (MAG-OX) tablet 400 mg, 400 mg, Oral, Daily  metFORMIN (GLUCOPHAGE) tablet 1,000 mg, 1,000 mg, Oral, BID WC  montelukast (SINGULAIR) tablet 10 mg, 10 mg, Oral, Nightly  nitroGLYCERIN (NITROSTAT) SL tablet 0.4 mg, 0.4 mg, Sublingual, Q5 Min PRN  sodium bicarbonate tablet 1,300 mg, 1,300 mg, Oral, BID  sodium chloride flush 0.9 % injection 10 mL, 10 mL, Intravenous, 2 times per day  sodium chloride flush 0.9 % injection 10 mL, 10 mL, Intravenous, PRN  acetaminophen (TYLENOL) tablet 650 mg, 650 mg, Oral, Q6H PRN **OR** acetaminophen (TYLENOL) suppository 650 mg, 650 mg, Rectal, Q6H PRN  polyethylene glycol (GLYCOLAX) packet 17 g, 17 g, Oral, Daily PRN  promethazine (PHENERGAN) tablet 12.5 mg, 12.5 mg, Oral, Q6H PRN **OR** ondansetron (ZOFRAN) injection 4 mg, 4 mg, Intravenous, Q6H PRN  pantoprazole (PROTONIX) tablet 40 mg, 40 mg, Oral, QAM AC  budesonide (PULMICORT) nebulizer suspension 500 mcg, 0.5 mg, Nebulization, BID  Arformoterol Tartrate (BROVANA) nebulizer solution 15 mcg, 15 mcg, Nebulization, BID    Allergies:  Aspirin; Statins; and Nsaids    Social History:    Tobacco:  Pt denies  Alcohol:  Pt denies  Illicit Drugs: Pt denies    Family History: Mother: ; stroke  Father: ; colon CA  Sister: Alive; DM  Daughter: Alive; Hx of heart attack    REVIEW OF SYSTEMS:    Aside from what was mentioned in the PMH and HPI, essentially unremarkable, all others negative. PHYSICAL EXAM:      Vitals:    BP (!) 161/67   Pulse 50   Temp 98 °F (36.7 °C) (Oral)   Resp 16   Ht 5' 3\" (1.6 m)   Wt 119 lb 12.8 oz (54.3 kg)   SpO2 97%   BMI 21.22 kg/m²       CONSTITUTIONAL:  awake, alert, sitting up in bed in no apparent distress, and appears stated age  EYES:  pupils equal, round and reactive to light, sclera anicteric and conjunctiva normal  ENT:  normocephalic, oral pharynx with moist mucous membranes  NECK:  supple   HEMATOLOGIC/LYMPHATICS:  no cervical lymphadenopathy and no supraclavicular lymphadenopathy  LUNGS:  No increased work of breathing, good air exchange, clear to auscultation bilaterally.   CARDIOVASCULAR:  Normal apical impulse, regular rate and rhythm, no murmur noted; 2+ pulses; no edema  ABDOMEN:  normal bowel sounds, soft, non-distended, non-tender, no masses palpated, no hepatosplenomegally  MUSCULOSKELETAL:  full range of motion noted  motor strength is 5 out of 5 all extremities bilaterally  NEUROLOGIC: 10/14/2015  · EGD 12/04/2020 with Dr. Arsenio Frederick- The distal esophagus looked abnormal: GERD. There was a small sliding hiatal hernia. The antral mucosa all looked abnormal: moderate gastritis. No upper GI source of bleeding or anemia. · Colonoscopy 12/05/2020 with Dr. Saad Brady- Cecum/Ascending colon: diverticular disease, Transverse colon: diverticular disease and 3mm transverse colon polyp that was removed with cold forceps, Descending/Sigmoid colon: tortuous and diverticular disease, Rectum/Anus: examined in normal and retroflexed positions and only Grade 3 hemorrhoids were seen. RECOMMENDATIONS:      · Monitor CBC daily  · Transfuse < 7 per PCP  · Defer comorbidities to others  · Iron replacements per PCP  · Supportive therapy  · Continue to monitor    Note: This report was completed utilizing computer voice recognition software. Every effort has been made to ensure accuracy, however; inadvertent computerized transcription errors may be present. Thank you very much for your consultation. We will follow closely with you. Discussed with Dr. Michael Campos developed by Dr. Sal Prince, CHELA, NP-C 12/10/2020 2:24 PM for Dr. Rohith Lewis. I HAD A FACE TO FACE ENCOUNTER WITH THE PATIENT. AGREE WITH THE EXAM, ASSESSMENT, AND PLAN AS OUTLINED ABOVE. ADDITION AND CORRECTIONS WERE DONE AS DEEMED APPROPRIATE. MY EXAM AND PLAN INCLUDE:   PATIENT WITH HISTORY OF AVM BLEEDING. CHRONIC STATUS POST ENTEROSCOPY WITH COAGULATION OF BLEEDING AVMS BY [facility] 5 YEARS AGO. RECENT EGD AND COLON DONE 5 DAYS AGO AND REPORTEDLY NEGATIVE. HAD CAPSULE ENDOSCOPY WITH MULTIPLE AVMS THROUGHOUT SMALL BOWEL ACCORDING TO PATIENT. LONG DISCUSSION WITH PATIENT, MY FEELING AT THIS TIME IS TO SUPPORT HER H&H WITH BLOOD TRANSFUSIONS AND IRON SUPPLEMENTS, WHICH SHE PREFERS IV AS IN THE PAST BY THE [facility]. HOPEFULLY THIS EPISODE WILL PASS SOON.  HOWEVER WITH PERSISTENT NEED FOR ANTICOAGULATION, CLOSE MONITORING OF H&H IS RECOMMENDED AT THIS TIME. PATIENT REQUEST TO GO TO [place]. SHE DOES NOT WANT ANY LOCAL CARE, WILL SIGN OFF AND GIVE TRANSFER DECISION TO PCP.     Mohini Young M.D. 12/10/2020 2:56 PM

## 2020-12-10 NOTE — CONSULTS
GENERAL SURGERY  CONSULT NOTE  12/10/2020    Physician Consulted: Dr. Boston Hobbs  Reason for Consult: GIB  Referring Physician: Dr. Meriel Lesches    CC: recurrent rectal bleeding    SHAYY Cervantes is a 77 y.o. female who presents for evaluation of GIB. 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 She has had many episodes of dark stools and acute on chronic anemia in the past; the last time her small bowel AVMs were cauterized was a few years ago. Today, she is 'extremely frustrated' by the fact that she keeps having bleeding episodes. She is hesitant to undergo more scopes. She has no abdominal pain but does feel fatigued.       Past Medical History:   Diagnosis Date    Anemia     4/2014 put on iron supplement    Arthritis     Asthma     Bronchial asthma, getting better, going to Dr. Francois Villa,  weaning off allergy shots and inhalers    Chronic systolic (congestive) heart failure ([address] 12/3/2020    Colon polyps 2/20/2014    Diabetes ([address]     Diabetes mellitus ([address]     am glucose running 130    Environmental allergies     spring/winter    Full dentures     Gastric ulcer 2/20/2014    GERD (gastroesophageal reflux disease)     High cholesterol     History of blood transfusion     Hypertension     124/70, has been good    Osteopenia        Past Surgical History:   Procedure Laterality Date    APPENDECTOMY      CARDIAC CATHETERIZATION  11/11/2020    Dr Franny Musa, OPEN  1980's early    COLONOSCOPY      COLONOSCOPY N/A 12/5/2020    COLONOSCOPY POLYPECTOMY SNARE/COLD BIOPSY performed by Venu John MD at [place] [address]  11/11/2020    DR Jack Calabrese Resolute [id number]  3.0x 22prox    ENDOSCOPY, COLON, DIAGNOSTIC      KNEE ARTHROCENTESIS      KNEE ARTHROSCOPY Left 1980's    OTHER SURGICAL HISTORY  10/13/2014    antrogade balloon enteroscopy with biopsy of polyp and scleratherapy    TUBAL LIGATION      UPPER GASTROINTESTINAL ENDOSCOPY      UPPER GASTROINTESTINAL ENDOSCOPY  04/18/2014    UPPER GASTROINTESTINAL ENDOSCOPY N/A 12/4/2020    EGD ESOPHAGOGASTRODUODENOSCOPY performed by Srinivasa Terry MD at [facility]       Medications Prior to Admission:    Prior to Admission medications    Medication Sig Start Date End Date Taking? Authorizing Provider   isosorbide dinitrate (ISORDIL) 20 MG tablet Take 1 tablet by mouth 2 times daily 12/6/20  Yes Carmel Martin MD   hydrALAZINE (APRESOLINE) 25 MG tablet Take 1 tablet by mouth 2 times daily 12/6/20  Yes Carmel Martin MD   aspirin 81 MG EC tablet Take 1 tablet by mouth daily 11/12/20  Yes Giovani Phillip MD   sodium bicarbonate 650 MG tablet Take 2 tablets by mouth 2 times daily 11/12/20  Yes Giovani Phillip MD   metFORMIN (GLUCOPHAGE) 500 MG tablet Take 3 tablets by mouth 2 times daily (with meals) Resume this on 11/13/20 with supper dose. 11/12/20  Yes Giovani Phillip MD   ezetimibe (ZETIA) 10 MG tablet Take 1 tablet by mouth nightly 11/12/20  Yes Giovani Phillip MD   ticagrelor (BRILINTA) 90 MG TABS tablet Take 1 tablet by mouth 2 times daily 11/12/20  Yes Giovani Phillip MD   empagliflozin (JARDIANCE) 25 MG tablet Take 25 mg by mouth daily   Yes Historical Provider, MD   raloxifene (EVISTA) 60 MG tablet Take 60 mg by mouth nightly   Yes Historical Provider, MD   magnesium oxide (MAG-OX) 400 MG tablet Take 400 mg by mouth daily. Yes Historical Provider, MD   montelukast (SINGULAIR) 10 MG tablet Take 10 mg by mouth nightly. Yes Historical Provider, MD   fenofibrate 160 MG tablet Take 160 mg by mouth nightly.    Yes Historical Provider, MD   Cholecalciferol (VITAMIN D-3 PO) Take 2,000 Units by mouth daily Last dose 4/15/14   Yes Historical Provider, MD   fluticasone-salmeterol (ADVAIR) 250-50 MCG/DOSE AEPB Inhale 1 puff into the lungs as needed. Is weaning off as of 2/5/2014   Yes Historical Provider, MD       Allergies   Allergen Reactions    Aspirin Hives    Statins Other (See Comments)     Muscle weakness    Nsaids Rash       Family History   Problem Relation Age of Onset    Stroke Mother     Cancer Father         colon    Heart Disease Father        Social History     Tobacco Use    Smoking status: Never Smoker    Smokeless tobacco: Never Used   Substance Use Topics    Alcohol use: Yes     Alcohol/week: 0.0 standard drinks     Comment: socially    Drug use: No         Review of Systems   General ROS: positive for  - fatigue  Hematological and Lymphatic ROS: positive for - bleeding problems and fatigue  Respiratory ROS: no cough, shortness of breath, or wheezing  Cardiovascular ROS: no chest pain or dyspnea on exertion  Gastrointestinal ROS: positive for - blood in stools and melena  Genito-Urinary ROS: no dysuria, trouble voiding, or hematuria  Musculoskeletal ROS: negative      PHYSICAL EXAM:    Vitals:    12/10/20 0139   BP: (!) 150/44   Pulse: 54   Resp: 20   Temp: 98.2 °F (36.8 °C)   SpO2: 98%       General Appearance:  awake, alert, oriented, in no acute distress and well developed, well nourished  Skin:  Skin color, texture, turgor normal. No rashes or lesions. Head/face:  NCAT  Eyes:  No gross abnormalities. Lungs:  Breathing Pattern: regular, no distress  Heart:  Heart regular rate and rhythm  Abdomen:  Soft, non-tender, normal bowel sounds. Extremities: Extremities warm to touch, pink, with no edema. LABS:    CBC  Recent Labs     12/09/20  1233  12/10/20  0528   WBC 6.1  --   --    HGB 7.2*   < > 8.0*   HCT 23.8*   < > 25.4*     --   --     < > = values in this interval not displayed.      BMP  Recent Labs     12/09/20  1233      K 3.7      CO2 25   BUN 46*   CREATININE 2.7*   CALCIUM 9.6     Liver Function  Recent Labs     12/09/20  1233   BILITOT 0.4   AST regular rate  Abdomen: soft, non-tender, non distended, no masses,  no organomegaly  Skin: No skin abnormalities  Neurologic: Alert and oriented x 3. Grossly normal  Musculoskeletal: No edema    A/P Recurrent rectal bleeding, on anticoagulation for recent cardiac stent    I had a long discussion with the patient regarding her recurrent bleeding and her need to stay on anticoagulation. I told her that because she must stay on anticoagulation, we likely will have to just monitor her Hg and transfuse as necessary. The other option is to refer her back to [facility] for another enteroscopy. She understands that even if they are able to cauterize several AVMs, this may or may not solve her problem and she may continue to have intermittent bleeding. She is very frustrated but understands. Recommend continued monitoring of her H/H with transfusions if it drops lower than 7 or if she becomes symptomatic from her anemia.      Deloris Licona MD  General Surgery

## 2020-12-10 NOTE — PLAN OF CARE
I refilled this already  Please f/u with pt and ensure this was filled  thanks Problem: Falls - Risk of:  Goal: Will remain free from falls  Description: Will remain free from falls  Outcome: Met This Shift  Goal: Absence of physical injury  Description: Absence of physical injury  Outcome: Met This Shift

## 2020-12-10 NOTE — ED NOTES
Confirmed sbar via ext [phone], pt prepared for transport via cart and monitor at this time.      Jc Melgar RN  12/09/20 2270

## 2020-12-10 NOTE — H&P
Deborah Oliveros History and Physical      CHIEF COMPLAINT:  Dark red blood in stools, weakness    History Obtained From:  Patient    HISTORY OF PRESENT ILLNESS:    The patient is a 77 y.o. female with significant past medical history of MI with stents place in early November who presents with having dark red and black blood from rectum yesterday. She felt weak and anemic also, so she presented to the ER. She was found to have worsening of anemia also with Hgb decreased from 8.6 down to 7.0. She was transfused 1 unit packed cells and admitted for observation. She took her last dose of aspirin and Brilinta yesterday morning. She had a similar episode 2 weeks ago and was hospitalized at [facility] and given 2 units of packed cells for GI bleeding then. Colonoscopy revealed a polyp and diverticulosis but no source of bleeding. EGD showed gastritis and duodenitis but no source of bleeding. She has a long history of GI blood loss and iron deficiency anemia and has followed at the [facility] for this. She also has anemia due to chronic kidney disease which is transitioning from stage 3 to stage 4 now. Past Medical History:     has a past medical history of Anemia, Arthritis, Asthma, Chronic systolic (congestive) heart failure ([address] (12/3/2020), Colon polyps (2/20/2014), Diabetes ([address] Diabetes mellitus ([address] Environmental allergies, Full dentures, Gastric ulcer (2/20/2014), GERD (gastroesophageal reflux disease), High cholesterol, History of blood transfusion, Hypertension, and Osteopenia. Past Surgical History:     has a past surgical history that includes Knee Arthrocentesis; Knee arthroscopy (Left, 1980's); Cholecystectomy, open (1980's early); Tubal ligation; Colonoscopy; Upper gastrointestinal endoscopy; Endoscopy, colon, diagnostic; Upper gastrointestinal endoscopy (04/18/2014); Appendectomy; other surgical history (10/13/2014); Cardiac catheterization (11/11/2020);  Coronary angioplasty with stent (11/11/2020); Upper gastrointestinal endoscopy (N/A, 12/4/2020); and Colonoscopy (N/A, 12/5/2020). Medications Prior to Admission:    Medications Prior to Admission: isosorbide dinitrate (ISORDIL) 20 MG tablet, Take 1 tablet by mouth 2 times daily  hydrALAZINE (APRESOLINE) 25 MG tablet, Take 1 tablet by mouth 2 times daily  aspirin 81 MG EC tablet, Take 1 tablet by mouth daily  sodium bicarbonate 650 MG tablet, Take 2 tablets by mouth 2 times daily  metFORMIN (GLUCOPHAGE) 500 MG tablet, Take 3 tablets by mouth 2 times daily (with meals) Resume this on 11/13/20 with supper dose. ezetimibe (ZETIA) 10 MG tablet, Take 1 tablet by mouth nightly  ticagrelor (BRILINTA) 90 MG TABS tablet, Take 1 tablet by mouth 2 times daily  empagliflozin (JARDIANCE) 25 MG tablet, Take 25 mg by mouth daily  raloxifene (EVISTA) 60 MG tablet, Take 60 mg by mouth nightly  magnesium oxide (MAG-OX) 400 MG tablet, Take 400 mg by mouth daily. montelukast (SINGULAIR) 10 MG tablet, Take 10 mg by mouth nightly. fenofibrate 160 MG tablet, Take 160 mg by mouth nightly. Cholecalciferol (VITAMIN D-3 PO), Take 2,000 Units by mouth daily Last dose 4/15/14  fluticasone-salmeterol (ADVAIR) 250-50 MCG/DOSE AEPB, Inhale 1 puff into the lungs as needed. Is weaning off as of 2/5/2014    Allergies:  Aspirin; Statins; and Nsaids    Social History:    reports that she has never smoked. She has never used smokeless tobacco. She reports current alcohol use. She reports that she does not use drugs.     Family History:       Problem Relation Age of Onset    Stroke Mother     Cancer Father         colon    Heart Disease Father        REVIEW OF SYSTEMS:  CONSTITUTIONAL:  negative for  fevers, anorexia and weight loss  RESPIRATORY:  negative for  dry cough, dyspnea, wheezing and chest pain  CARDIOVASCULAR:  negative for  chest pain, orthopnea, edema, syncope  GASTROINTESTINAL:  positive for dark blood in stools, melena  negative for nausea, vomiting and abdominal pain  GENITOURINARY:  negative for frequency, dysuria and hematuria  MUSCULOSKELETAL:  negative for  myalgias and arthralgias  NEUROLOGICAL:  negative for headaches, dizziness and syncope    PHYSICAL EXAM:  Vitals:  BP (!) 150/44 Comment: manual  Pulse 54   Temp 98.2 °F (36.8 °C) (Oral)   Resp 20   Ht 5' 3\" (1.6 m)   Wt 119 lb 12.8 oz (54.3 kg)   SpO2 98%   BMI 21.22 kg/m²   CONSTITUTIONAL:  awake, alert, cooperative, no apparent distress, and appears stated age  EYES:  Lids and lashes normal, pupils equal, round and reactive to light, extra ocular muscles intact, sclera clear, conjunctiva normal  ENT:  Normocephalic, without obvious abnormality, atraumatic, sinuses nontender on palpation, external ears without lesions, oral pharynx with moist mucus membranes, tonsils without erythema or exudates, gums normal and good dentition. NECK:  Supple, symmetrical, trachea midline, no adenopathy, thyroid symmetric, not enlarged and no tenderness, skin normal  HEMATOLOGIC/LYMPHATICS:  no cervical lymphadenopathy  BACK:  Symmetric, no curvature, spinous processes are non-tender on palpation, paraspinous muscles are non-tender on palpation, no costal vertebral tenderness  LUNGS:  No increased work of breathing, good air exchange, clear to auscultation bilaterally, no crackles or wheezing  CARDIOVASCULAR:  Normal apical impulse, regular rate and rhythm, normal S1 and S2, no S3 or S4, and no murmur noted  ABDOMEN:  Flat, soft, non-tender, no HSM, no masses, no guarding no rebound tenderness  CHEST/BREASTS:  No chest tenderness  GENITAL/URINARY:  deferred  MUSCULOSKELETAL:  No cyanosis, no edema, no clubbing  NEUROLOGIC:  Awake, alert, oriented to name, place and time. Cranial nerves II-XII are grossly intact. Motor is 5 out of 5 bilaterally. Cerebellar finger to nose, heel to shin intact. Sensory is intact.   Babinski down going, Romberg negative, and gait is normal.  SKIN:  no bruising or bleeding and no

## 2020-12-11 VITALS
WEIGHT: 121.4 LBS | TEMPERATURE: 98.7 F | SYSTOLIC BLOOD PRESSURE: 149 MMHG | OXYGEN SATURATION: 96 % | DIASTOLIC BLOOD PRESSURE: 58 MMHG | RESPIRATION RATE: 16 BRPM | HEIGHT: 63 IN | HEART RATE: 48 BPM | BODY MASS INDEX: 21.51 KG/M2

## 2020-12-11 LAB
HCT VFR BLD CALC: 28.9 % (ref 34–48)
HEMOGLOBIN: 8.7 G/DL (ref 11.5–15.5)

## 2020-12-11 PROCEDURE — 2580000003 HC RX 258: Performed by: FAMILY MEDICINE

## 2020-12-11 PROCEDURE — 36415 COLL VENOUS BLD VENIPUNCTURE: CPT

## 2020-12-11 PROCEDURE — 6370000000 HC RX 637 (ALT 250 FOR IP): Performed by: FAMILY MEDICINE

## 2020-12-11 PROCEDURE — 85018 HEMOGLOBIN: CPT

## 2020-12-11 PROCEDURE — 85014 HEMATOCRIT: CPT

## 2020-12-11 RX ORDER — NITROGLYCERIN 0.4 MG/1
TABLET SUBLINGUAL
Qty: 25 TABLET | Refills: 3 | Status: SHIPPED | OUTPATIENT
Start: 2020-12-11

## 2020-12-11 RX ORDER — PANTOPRAZOLE SODIUM 40 MG/1
40 TABLET, DELAYED RELEASE ORAL
Qty: 30 TABLET | Refills: 3 | Status: SHIPPED | OUTPATIENT
Start: 2020-12-12 | End: 2022-03-07

## 2020-12-11 RX ADMIN — ISOSORBIDE DINITRATE 20 MG: 10 TABLET ORAL at 08:08

## 2020-12-11 RX ADMIN — SODIUM CHLORIDE, PRESERVATIVE FREE 10 ML: 5 INJECTION INTRAVENOUS at 08:08

## 2020-12-11 RX ADMIN — HYDRALAZINE HYDROCHLORIDE 25 MG: 25 TABLET, FILM COATED ORAL at 08:09

## 2020-12-11 RX ADMIN — FENOFIBRATE 160 MG: 160 TABLET ORAL at 08:09

## 2020-12-11 RX ADMIN — SODIUM BICARBONATE 1300 MG: 650 TABLET ORAL at 08:08

## 2020-12-11 RX ADMIN — PANTOPRAZOLE SODIUM 40 MG: 40 TABLET, DELAYED RELEASE ORAL at 05:37

## 2020-12-11 RX ADMIN — METFORMIN HYDROCHLORIDE 1000 MG: 1000 TABLET ORAL at 08:09

## 2020-12-11 RX ADMIN — Medication 400 MG: at 08:09

## 2020-12-11 ASSESSMENT — PAIN SCALES - GENERAL: PAINLEVEL_OUTOF10: 0

## 2020-12-11 NOTE — PLAN OF CARE
Problem: Falls - Risk of:  Goal: Will remain free from falls  Description: Will remain free from falls  12/11/2020 0240 by Juan Carlos Bruce RN  Outcome: Met This Shift  12/10/2020 1435 by Macario Austin RN  Outcome: Ongoing  Goal: Absence of physical injury  Description: Absence of physical injury  12/11/2020 0240 by Juan Carlos Bruce RN  Outcome: Met This Shift  12/10/2020 1435 by Macario Austin RN  Outcome: Ongoing

## 2020-12-11 NOTE — PROGRESS NOTES
GENERAL SURGERY  DAILY PROGRESS NOTE  12/11/2020    Subjective:  Patient states she feels okay today. No events overnight. Plan for discharge today.     Objective:  BP (!) 149/58   Pulse (!) 48   Temp 98.7 °F (37.1 °C) (Oral)   Resp 16   Ht 5' 3\" (1.6 m)   Wt 121 lb 6.4 oz (55.1 kg)   SpO2 96%   BMI 21.51 kg/m²     GENERAL:  Laying in bed, awake, alert, cooperative, no apparent distress  HEAD: Normocephalic, atraumatic  EYES: No sclera icterus, pupils equal  LUNGS:  No increased work of breathing  CARDIOVASCULAR:  RR  ABDOMEN:  Soft, non-tender, non-distended  EXTREMITIES: No edema or swelling  SKIN: Warm and dry    Assessment/Plan:  77 y.o. female with GIB    - Hgb stable at 8.7  - okay for discharge from surgery standpoint  - call with any questions    D/w Dr. Mariya Rose, Surgical Resident, and Dr. India Garcia    Electronically signed by Karen Clark DO on 12/11/2020 at 7:53 AM

## 2020-12-11 NOTE — PROGRESS NOTES
Admit Date: 12/9/2020    Subjective:     Patient states she passed some dark black stool. She is not dizzy or weak now. Scheduled Meds:   empagliflozin  25 mg Oral Daily    ezetimibe  10 mg Oral Nightly    fenofibrate  160 mg Oral Daily    hydrALAZINE  25 mg Oral BID    isosorbide dinitrate  20 mg Oral BID    magnesium oxide  400 mg Oral Daily    metFORMIN  1,000 mg Oral BID WC    montelukast  10 mg Oral Nightly    sodium bicarbonate  1,300 mg Oral BID    sodium chloride flush  10 mL Intravenous 2 times per day    pantoprazole  40 mg Oral QAM AC    budesonide  0.5 mg Nebulization BID    Arformoterol Tartrate  15 mcg Nebulization BID     Continuous Infusions:  PRN Meds:nitroGLYCERIN, sodium chloride flush, acetaminophen **OR** acetaminophen, polyethylene glycol, promethazine **OR** ondansetron      Objective:     I/O last 3 completed shifts: In: 600 [P.O.:600]  Out: 400 [Urine:400]  No intake/output data recorded. BP (!) 149/58   Pulse (!) 48   Temp 98.7 °F (37.1 °C) (Oral)   Resp 16   Ht 5' 3\" (1.6 m)   Wt 121 lb 6.4 oz (55.1 kg)   SpO2 96%   BMI 21.51 kg/m²     LUNGS:  No increased work of breathing, good air exchange, clear to auscultation bilaterally, no crackles or wheezing  CARDIOVASCULAR:  Normal apical impulse, regular rate and rhythm, normal S1 and S2, no S3 or S4, and no murmur noted  ABDOMEN:  Flat, soft, non-tender  MUSCULOSKELETAL:  No cyanosis, no edema, no clubbing. Data Review  CBC:   Recent Labs     12/09/20  1233  12/10/20  0528 12/10/20  1650 12/11/20  0519   WBC 6.1  --   --   --   --    HGB 7.2*   < > 8.0* 8.6* 8.7*     --   --   --   --     < > = values in this interval not displayed.      BMP:    Recent Labs     12/09/20  1233      K 3.7      CO2 25   BUN 46*   CREATININE 2.7*   GLUCOSE 234*     Coagulation:   Lab Results   Component Value Date    INR 1.1 12/09/2020    APTT 28.6 12/02/2020     Cardiac markers: No results found for: CKMB, TROPONINT, MYOGLOBIN  Lab Results   Component Value Date    LABPROT 0.1 11/09/2020    LABPROT 0.1 11/09/2020    LABALBU 3.6 12/09/2020     Lab Results   Component Value Date    ALT 10 12/09/2020    AST 21 12/09/2020    ALKPHOS 43 12/09/2020    BILITOT 0.4 12/09/2020         Assessment:     Patient Active Problem List    Diagnosis Date Noted    Acute blood loss anemia 12/03/2020     Priority: High    UGIB (upper gastrointestinal bleed) 12/02/2020     Priority: High    Mobitz type 1 second degree atrioventricular block 12/04/2020     Priority: Medium    Ischemic cardiomyopathy 12/03/2020     Priority: Medium    Chronic systolic (congestive) heart failure ([address] 12/03/2020     Priority: Medium    Anemia of chronic kidney failure 12/03/2020     Priority: Medium    Iron deficiency anemia due to chronic blood loss 12/03/2020     Priority: Medium    Old myocardial infarct 11/10/2020     Priority: Medium    Type 2 diabetes mellitus ([address] 11/08/2020     Priority: Medium    Stage 3b chronic kidney disease 11/08/2020     Priority: Medium    Encounter for monitoring antiplatelet therapy 92/84/6196     Priority: Low    Moderate malnutrition ([address] 12/03/2020     Priority: Low    Essential hypertension 11/08/2020     Priority: Low    Mitral regurgitation 11/08/2020     Priority: Low    Colon polyps 02/20/2014     Priority: Low        GI bleeding likely secondary to AVM's of small bowel    Plan:     Discharge home today. Plan to check Hgb level on `12/15/20. Patient to contact the [facility] regarding iron infusion.

## 2020-12-11 NOTE — DISCHARGE SUMMARY
Physician Discharge Summary     Patient ID:  Miki Anderson  [id number]  49 y.o.  1945    Admit date: 12/9/2020    Discharge date and time: 12/11/2020  9:23 AM     Admitting Physician: Daksha Ellington MD     Consulting physicians:Dr. Lubna Wells, Dr. Candis Woodruff    Admission Diagnoses: Acute blood loss anemia [D62]  Acute blood loss anemia [D62]    Discharge Diagnoses:   Acute blood loss anemia 12/03/2020        Priority: High    UGIB (upper gastrointestinal bleed) 12/02/2020       Priority: High    Mobitz type 1 second degree atrioventricular block 12/04/2020       Priority: Medium    Ischemic cardiomyopathy 12/03/2020       Priority: Medium    Chronic systolic (congestive) heart failure (HCC) 12/03/2020       Priority: Medium    Anemia of chronic kidney failure 12/03/2020       Priority: Medium    Iron deficiency anemia due to chronic blood loss 12/03/2020       Priority: Medium    Old myocardial infarct 11/10/2020       Priority: Medium    Type 2 diabetes mellitus [address] 11/08/2020       Priority: Medium    Stage 3b chronic kidney disease 11/08/2020       Priority: Medium    Encounter for monitoring antiplatelet therapy 76/46/7898       Priority: Low    Moderate malnutrition ([facility] 12/03/2020       Priority: Low    Essential hypertension 11/08/2020       Priority: Low    Mitral regurgitation 11/08/2020       Priority: Low    Colon polyps 02/20/2014       Priority: Low        GI bleeding likely secondary to AVM's of small bowel      Hospital Course: Patient had transfusion of 1 unit packed cellsand Hgb stabilized and was 8.7 at time of discharge. Patient had another melanotic stool the day of discharge, but no sign of active bleeding. She was advised to resume taking the Aspirin and Brilinta on 12/13/20, it was held on 12/10 and 12/11/20. Treatments: Pantoprazole to treat gastritis and duodenitis    Disposition: HOme, condition on discharge is stable.      Patient Instructions:      Medication List      START taking these medications    pantoprazole 40 MG tablet  Commonly known as:  PROTONIX  Take 1 tablet by mouth every morning (before breakfast)  Start taking on:  December 12, 2020        CONTINUE taking these medications    aspirin 81 MG EC tablet  Take 1 tablet by mouth daily     ezetimibe 10 MG tablet  Commonly known as:  ZETIA  Take 1 tablet by mouth nightly     fenofibrate 160 MG tablet  Commonly known as:  TRIGLIDE     fluticasone-salmeterol 250-50 MCG/DOSE Aepb  Commonly known as:  ADVAIR     hydrALAZINE 25 MG tablet  Commonly known as:  APRESOLINE  Take 1 tablet by mouth 2 times daily     isosorbide dinitrate 20 MG tablet  Commonly known as:  ISORDIL  Take 1 tablet by mouth 2 times daily     Jardiance 25 MG tablet  Generic drug:  empagliflozin     magnesium oxide 400 MG tablet  Commonly known as:  MAG-OX     metFORMIN 500 MG tablet  Commonly known as:  GLUCOPHAGE  Take 3 tablets by mouth 2 times daily (with meals) Resume this on 11/13/20 with supper dose. nitroGLYCERIN 0.4 MG SL tablet  Commonly known as:  NITROSTAT  up to max of 3 total doses. If no relief after 1 dose, call 911. raloxifene 60 MG tablet  Commonly known as:  EVISTA     Singulair 10 MG tablet  Generic drug:  montelukast     sodium bicarbonate 650 MG tablet  Take 2 tablets by mouth 2 times daily     ticagrelor 90 MG Tabs tablet  Commonly known as:  BRILINTA  Take 1 tablet by mouth 2 times daily     VITAMIN D-3 PO           Where to Get Your Medications      These medications were sent to [address] [place] [address]    Phone:  [phone]   · nitroGLYCERIN 0.4 MG SL tablet  · pantoprazole 40 MG tablet       Activity: activity as tolerated  Diet: diabetic diet    Follow-up with primary care physician in 2 weeks.   Patient is advised to repeat CBC on 12/15/20 and advise if she becomes weak or develops large amount of bleeding she should return to ER. Effort will be made to do future transfusion at infusion center. She is advised to contact the [facility] to receive IV iron.      Signed:  Timothy Garcia  12/11/2020  12:55 PM

## 2020-12-14 ENCOUNTER — HOSPITAL ENCOUNTER (OUTPATIENT)
Age: 75
Discharge: HOME | End: 2020-12-14
Payer: MEDICARE

## 2020-12-14 LAB
HCT VFR BLD CALC: 27.7 % (ref 34–48)
HEMOGLOBIN: 8.4 G/DL (ref 11.5–15.5)
MCH RBC QN AUTO: 27.3 PG (ref 26–35)
MCHC RBC AUTO-ENTMCNC: 30.3 % (ref 32–34.5)
MCV RBC AUTO: 89.9 FL (ref 80–99.9)
PDW BLD-RTO: 16.9 FL (ref 11.5–15)
PLATELET # BLD: 281 E9/L (ref 130–450)
PMV BLD AUTO: 9.5 FL (ref 7–12)
RBC # BLD: 3.08 E12/L (ref 3.5–5.5)
WBC # BLD: 5.6 E9/L (ref 4.5–11.5)

## 2020-12-14 PROCEDURE — 36415 COLL VENOUS BLD VENIPUNCTURE: CPT

## 2020-12-14 PROCEDURE — 85027 COMPLETE CBC AUTOMATED: CPT

## 2020-12-14 NOTE — PROGRESS NOTES
CLINICAL PHARMACY NOTE: MEDS TO [address] [place] [address] Drive Select Patient?: No  Total # of Prescriptions Filled: 2   The following medications were delivered to the patient:  · Nitroglycerin 0.4 mg  · Pantoprazole 40  Total # of Interventions Completed: 6  Time Spent (min): 45    Additional Documentation:

## 2020-12-15 ENCOUNTER — CARE COORDINATION (OUTPATIENT)
Dept: CASE MANAGEMENT | Age: 75
End: 2020-12-15

## 2020-12-15 NOTE — CARE COORDINATION
[address] [facility] Transitions Bundled Payments for Care Improvement (BPCI) Follow Up Call  Qualifying Diagnosis of AMI    12/15/2020  Patient Name:  Pennie Gonzalez   YOB: 1945  Discharge Date:  12/11/20  RARS:  Readmission Risk Score: 23    PCP:  Wendall Koyanagi, MD  [facility] 45 Transitions Initial Follow Up Call    Call within 2 business days of discharge: Yes    Last Discharge [address] [address]       Complaint Diagnosis Description Type Department Provider    12/9/20 Shortness of Breath; Rectal Bleeding Acute blood loss anemia . .. ED to Hosp-Admission (Discharged) (ADMITTED) Leonard Young MD; Prema Mcpherson Dombr. .. Assessment:          Short of Breath: occasionally with activity   Fatigue: a little bit   Wheezing: denies   Swelling feet, ankles: denies   Chest Pain: denies   Appetite: bites, nothing tastes good, eating fruit, cereal, soup   Sleeping OK: yes   Nausea, Sweating: denies   Heart palpitations: yes, states that, \"is normal for me\"   Dizziness: denies  [address]: denies need   Medication Needs/Questions: changed medications reviewed, states blood sugars are a little high, PCP appointment tomorrow. Plans on discussing this.  Transportation Need:  denies   Live Alone: yes    Ability to [employer], [place]: ok   Follow Up Appointment Scheduled: yes   Do you feel like you have everything you need to stay well at home?  yes   Agreeable to ongoing Care Transition Communications:yes    Follow Up Concerns: poor appetite, low BS, heart palpitations   Future Appointments   Date Time Provider Gayathri Dempsey   12/21/2020  1:40 PM Lilian Florian MD [facility] Transitions will continue to follow per [facility] , RN, CTN

## 2020-12-17 ENCOUNTER — HOSPITAL ENCOUNTER (OUTPATIENT)
Age: 75
Discharge: HOME | End: 2020-12-17
Payer: MEDICARE

## 2020-12-17 LAB
ABO/RH: NORMAL
ANTIBODY SCREEN: NORMAL
HCT VFR BLD CALC: 21.5 % (ref 34–48)
HEMOGLOBIN: 6.2 G/DL (ref 11.5–15.5)

## 2020-12-17 PROCEDURE — 36415 COLL VENOUS BLD VENIPUNCTURE: CPT

## 2020-12-17 PROCEDURE — 86923 COMPATIBILITY TEST ELECTRIC: CPT

## 2020-12-17 PROCEDURE — 86901 BLOOD TYPING SEROLOGIC RH(D): CPT

## 2020-12-17 PROCEDURE — 86850 RBC ANTIBODY SCREEN: CPT

## 2020-12-17 PROCEDURE — 85014 HEMATOCRIT: CPT

## 2020-12-17 PROCEDURE — P9016 RBC LEUKOCYTES REDUCED: HCPCS

## 2020-12-17 PROCEDURE — 85018 HEMOGLOBIN: CPT

## 2020-12-17 PROCEDURE — 86900 BLOOD TYPING SEROLOGIC ABO: CPT

## 2020-12-18 ENCOUNTER — HOSPITAL ENCOUNTER (OUTPATIENT)
Dept: INFUSION THERAPY | Age: 75
Setting detail: INFUSION SERIES
Discharge: HOME | End: 2020-12-18
Payer: MEDICARE

## 2020-12-18 VITALS
SYSTOLIC BLOOD PRESSURE: 161 MMHG | RESPIRATION RATE: 16 BRPM | HEART RATE: 73 BPM | DIASTOLIC BLOOD PRESSURE: 47 MMHG | TEMPERATURE: 98.8 F

## 2020-12-18 LAB
BLOOD BANK DISPENSE STATUS: NORMAL
BLOOD BANK PRODUCT CODE: NORMAL
BPU ID: NORMAL
DESCRIPTION BLOOD BANK: NORMAL

## 2020-12-18 PROCEDURE — P9016 RBC LEUKOCYTES REDUCED: HCPCS

## 2020-12-18 PROCEDURE — 36430 TRANSFUSION BLD/BLD COMPNT: CPT

## 2020-12-18 PROCEDURE — 2580000003 HC RX 258

## 2020-12-18 PROCEDURE — 2580000003 HC RX 258: Performed by: FAMILY MEDICINE

## 2020-12-18 RX ORDER — SODIUM CHLORIDE 0.9 % (FLUSH) 0.9 %
SYRINGE (ML) INJECTION
Status: COMPLETED
Start: 2020-12-18 | End: 2020-12-18

## 2020-12-18 RX ORDER — 0.9 % SODIUM CHLORIDE 0.9 %
20 INTRAVENOUS SOLUTION INTRAVENOUS ONCE
Status: COMPLETED | OUTPATIENT
Start: 2020-12-18 | End: 2020-12-18

## 2020-12-18 RX ADMIN — SODIUM CHLORIDE, PRESERVATIVE FREE 10 ML: 5 INJECTION INTRAVENOUS at 08:30

## 2020-12-18 RX ADMIN — SODIUM CHLORIDE 20 ML: 9 INJECTION, SOLUTION INTRAVENOUS at 08:41

## 2020-12-21 ENCOUNTER — OFFICE VISIT (OUTPATIENT)
Dept: SURGERY | Age: 75
End: 2020-12-21
Payer: MEDICARE

## 2020-12-21 ENCOUNTER — HOSPITAL ENCOUNTER (OUTPATIENT)
Age: 75
Discharge: HOME | End: 2020-12-21
Payer: MEDICARE

## 2020-12-21 VITALS
SYSTOLIC BLOOD PRESSURE: 140 MMHG | DIASTOLIC BLOOD PRESSURE: 60 MMHG | RESPIRATION RATE: 20 BRPM | BODY MASS INDEX: 20.38 KG/M2 | TEMPERATURE: 97.1 F | HEIGHT: 63 IN | WEIGHT: 115 LBS | OXYGEN SATURATION: 100 % | HEART RATE: 74 BPM

## 2020-12-21 LAB
FERRITIN: 168 NG/ML
HCT VFR BLD CALC: 26.4 % (ref 34–48)
HEMOGLOBIN: 7.8 G/DL (ref 11.5–15.5)
IRON SATURATION: 4 % (ref 15–50)
IRON: 21 MCG/DL (ref 37–145)
TOTAL IRON BINDING CAPACITY: 472 MCG/DL (ref 250–450)

## 2020-12-21 PROCEDURE — 1111F DSCHRG MED/CURRENT MED MERGE: CPT | Performed by: SURGERY

## 2020-12-21 PROCEDURE — 83540 ASSAY OF IRON: CPT

## 2020-12-21 PROCEDURE — G8420 CALC BMI NORM PARAMETERS: HCPCS | Performed by: SURGERY

## 2020-12-21 PROCEDURE — 36415 COLL VENOUS BLD VENIPUNCTURE: CPT

## 2020-12-21 PROCEDURE — 85018 HEMOGLOBIN: CPT

## 2020-12-21 PROCEDURE — 85014 HEMATOCRIT: CPT

## 2020-12-21 PROCEDURE — 83550 IRON BINDING TEST: CPT

## 2020-12-21 PROCEDURE — 1123F ACP DISCUSS/DSCN MKR DOCD: CPT | Performed by: SURGERY

## 2020-12-21 PROCEDURE — 4040F PNEUMOC VAC/ADMIN/RCVD: CPT | Performed by: SURGERY

## 2020-12-21 PROCEDURE — G8484 FLU IMMUNIZE NO ADMIN: HCPCS | Performed by: SURGERY

## 2020-12-21 PROCEDURE — 99214 OFFICE O/P EST MOD 30 MIN: CPT | Performed by: SURGERY

## 2020-12-21 PROCEDURE — G8400 PT W/DXA NO RESULTS DOC: HCPCS | Performed by: SURGERY

## 2020-12-21 PROCEDURE — G8427 DOCREV CUR MEDS BY ELIG CLIN: HCPCS | Performed by: SURGERY

## 2020-12-21 PROCEDURE — 82728 ASSAY OF FERRITIN: CPT

## 2020-12-21 PROCEDURE — 3017F COLORECTAL CA SCREEN DOC REV: CPT | Performed by: SURGERY

## 2020-12-21 PROCEDURE — 1036F TOBACCO NON-USER: CPT | Performed by: SURGERY

## 2020-12-21 PROCEDURE — 1090F PRES/ABSN URINE INCON ASSESS: CPT | Performed by: SURGERY

## 2020-12-21 NOTE — PROGRESS NOTES
Progress Note - Follow up    Patient's Name/Date of Birth: Sergio Concepcion / 1945    Date: 12/21/2020    PCP: Heriberto Chavez MD    Referring Physician:   Harris Myers MD  [phone]    Chief Complaint   Patient presents with    Follow-Up from Hospital       HPI:    The patient is frustrated because she continues to bleed. She said she has to wait until her Hg is under 7 for a transfusion and she gets symptomatic before she gets to that point. She is taking her blood thinner as prescribed. She wants to get to [address] [facility] for enteroscopy. She has no nausea or vomiting. She said she is dizzy and tired and weak from her anemia. Patient's medications, allergies, past medical, surgical, social and family histories were reviewed and updated as appropriate. Review of Systems  Constitutional: negative  Respiratory: negative  Cardiovascular: negative  Gastrointestinal: as in hpi  Genitourinary:negative  Integument/breast: negative    Physical Exam:  BP (!) 140/60   Pulse 74   Temp 97.1 °F (36.2 °C) (Infrared)   Resp 20   Ht 5' 3\" (1.6 m)   Wt 115 lb (52.2 kg)   SpO2 100%   BMI 20.37 kg/m²   General appearance: alert, cooperative and in no acute distress. Lungs: normal work of breathing  Heart: regular rate  Abdomen:  soft, nontender, nondistended  Musculoskeletal: symmetrical without edema. Skin: normal       Impression/Plan:  77y.o. 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 I told her I can set her up for a blood transfusion because she is symptomatic but she said she has an appointment with her hematologist tomorrow and will wait to see him. I will try to facilitate her referral to [facility] for enteroscopy. I emphasized the importance of staying on her blood thinner or her stent will become occluded.       Electronically by Sonya Davidson MD, General Surgery  on 12/21/2020 at 2:44 PM      Send copy of H&P to PCP, Veronica Phillips MD and referring physician, Sudeep Quiroz MD      12/21/2020

## 2020-12-22 ENCOUNTER — CARE COORDINATION (OUTPATIENT)
Dept: CASE MANAGEMENT | Age: 75
End: 2020-12-22

## 2020-12-22 NOTE — CARE COORDINATION
[facility] Transitions Follow Up Call    2020    Patient: Sergio Concepcion  Patient : 1945   MRN: <[id number]>  Reason for Admission:   Discharge Date: 20 RARS: Readmission Risk Score: 23    Follow Up: Attempted to contact patient for BPCI-A follow up. Unable to reach patient. No answer. Will try again at a later time. No future appointments.     Wild Talley RN

## 2020-12-28 ENCOUNTER — HOSPITAL ENCOUNTER (EMERGENCY)
Age: 75
Discharge: HOME | End: 2020-12-28
Attending: EMERGENCY MEDICINE
Payer: MEDICARE

## 2020-12-28 VITALS
SYSTOLIC BLOOD PRESSURE: 148 MMHG | DIASTOLIC BLOOD PRESSURE: 60 MMHG | WEIGHT: 115 LBS | RESPIRATION RATE: 16 BRPM | BODY MASS INDEX: 20.38 KG/M2 | HEART RATE: 75 BPM | TEMPERATURE: 98.3 F | HEIGHT: 63 IN | OXYGEN SATURATION: 100 %

## 2020-12-28 LAB
ABO/RH: NORMAL
ANION GAP SERPL CALCULATED.3IONS-SCNC: 11 MMOL/L (ref 7–16)
ANTIBODY SCREEN: NORMAL
BLOOD BANK DISPENSE STATUS: NORMAL
BLOOD BANK DISPENSE STATUS: NORMAL
BLOOD BANK PRODUCT CODE: NORMAL
BLOOD BANK PRODUCT CODE: NORMAL
BPU ID: NORMAL
BPU ID: NORMAL
BUN BLDV-MCNC: 50 MG/DL (ref 8–23)
CALCIUM SERPL-MCNC: 9.2 MG/DL (ref 8.6–10.2)
CHLORIDE BLD-SCNC: 103 MMOL/L (ref 98–107)
CO2: 21 MMOL/L (ref 22–29)
CREAT SERPL-MCNC: 2.8 MG/DL (ref 0.5–1)
DESCRIPTION BLOOD BANK: NORMAL
DESCRIPTION BLOOD BANK: NORMAL
GFR AFRICAN AMERICAN: 20
GFR NON-AFRICAN AMERICAN: 16 ML/MIN/1.73
GLUCOSE BLD-MCNC: 234 MG/DL (ref 74–99)
HCT VFR BLD CALC: 19.8 % (ref 34–48)
HEMOGLOBIN: 5.7 G/DL (ref 11.5–15.5)
MCH RBC QN AUTO: 27.4 PG (ref 26–35)
MCHC RBC AUTO-ENTMCNC: 28.8 % (ref 32–34.5)
MCV RBC AUTO: 95.2 FL (ref 80–99.9)
PDW BLD-RTO: 18.6 FL (ref 11.5–15)
PLATELET # BLD: 231 E9/L (ref 130–450)
PMV BLD AUTO: 10 FL (ref 7–12)
POTASSIUM SERPL-SCNC: 4.2 MMOL/L (ref 3.5–5)
RBC # BLD: 2.08 E12/L (ref 3.5–5.5)
SODIUM BLD-SCNC: 135 MMOL/L (ref 132–146)
TROPONIN: <0.01 NG/ML (ref 0–0.03)
WBC # BLD: 7.4 E9/L (ref 4.5–11.5)

## 2020-12-28 PROCEDURE — 85027 COMPLETE CBC AUTOMATED: CPT

## 2020-12-28 PROCEDURE — 99285 EMERGENCY DEPT VISIT HI MDM: CPT

## 2020-12-28 PROCEDURE — 86901 BLOOD TYPING SEROLOGIC RH(D): CPT

## 2020-12-28 PROCEDURE — 86850 RBC ANTIBODY SCREEN: CPT

## 2020-12-28 PROCEDURE — 86900 BLOOD TYPING SEROLOGIC ABO: CPT

## 2020-12-28 PROCEDURE — 2580000003 HC RX 258: Performed by: EMERGENCY MEDICINE

## 2020-12-28 PROCEDURE — 36415 COLL VENOUS BLD VENIPUNCTURE: CPT

## 2020-12-28 PROCEDURE — 93005 ELECTROCARDIOGRAM TRACING: CPT | Performed by: EMERGENCY MEDICINE

## 2020-12-28 PROCEDURE — 86923 COMPATIBILITY TEST ELECTRIC: CPT

## 2020-12-28 PROCEDURE — 36430 TRANSFUSION BLD/BLD COMPNT: CPT

## 2020-12-28 PROCEDURE — P9016 RBC LEUKOCYTES REDUCED: HCPCS

## 2020-12-28 PROCEDURE — 80048 BASIC METABOLIC PNL TOTAL CA: CPT

## 2020-12-28 PROCEDURE — 84484 ASSAY OF TROPONIN QUANT: CPT

## 2020-12-28 RX ORDER — 0.9 % SODIUM CHLORIDE 0.9 %
250 INTRAVENOUS SOLUTION INTRAVENOUS ONCE
Status: COMPLETED | OUTPATIENT
Start: 2020-12-28 | End: 2020-12-28

## 2020-12-28 RX ADMIN — SODIUM CHLORIDE 250 ML: 9 INJECTION, SOLUTION INTRAVENOUS at 15:42

## 2020-12-28 NOTE — ED NOTES
Bed:  [address] [address]  Expected date:   Expected time:   Means of arrival:   Comments:  Todd Arellano RN  12/28/20 1217

## 2020-12-28 NOTE — ED PROVIDER NOTES
HPI:  12/28/20, Time: 12:24 PM EMY Millan is a 77 y.o. female presenting to the ED for Fatigue SOB with Exhertion and Chest pain, , beginning sent from [facility] ago. The complaint has been persistent, severe in severity, and worsened by walking, light exertion. Patient states she has a history of coronary artery disease currently taking Brilinta reports some bloody stools on Joaquin. She was at [facility] today for iron transfusions which has been getting for 5 years for iron deficiency anemia. She was sent in the emergency department because of a reported low hemoglobin at the [facility] her hemoglobin was 5.6 g/dL. She does have symptoms with exertion. No fevers or chills or cough. No Covid exposures. She did report some abdominal pain and there recent past.  She reports history of AVMs in her colon which she has had cauterized at the [facility]. Normal bowel movement today. She has no abdominal pain currently. No nausea. ROS:   Pertinent positives and negatives are stated within HPI, all other systems reviewed and are negative.  --------------------------------------------- PAST HISTORY ---------------------------------------------  Past Medical History:  has a past medical history of Anemia, Arthritis, Asthma, Chronic systolic (congestive) heart failure ([address] Colon polyps, Diabetes ([facility] [address] Diabetes mellitus ([facility] [address] Environmental allergies, Full dentures, Gastric ulcer, GERD (gastroesophageal reflux disease), High cholesterol, History of blood transfusion, Hypertension, and Osteopenia. Past Surgical History:  has a past surgical history that includes Knee Arthrocentesis; Knee arthroscopy (Left, 1980's); Cholecystectomy, open (1980's early); Tubal ligation; Colonoscopy; Upper gastrointestinal endoscopy; Endoscopy, colon, diagnostic; Upper gastrointestinal endoscopy (04/18/2014); Appendectomy; other surgical history (10/13/2014); Cardiac catheterization (11/11/2020); Coronary angioplasty with stent (11/11/2020); Upper gastrointestinal endoscopy (N/A, 12/4/2020); and Colonoscopy (N/A, 12/5/2020). Social History:  reports that she has never smoked. She has never used smokeless tobacco. She reports current alcohol use. She reports that she does not use drugs. Family History: family history includes Cancer in her father; Heart Disease in her father; Stroke in her mother. The patients home medications have been reviewed. Allergies: Aspirin, Statins, and Nsaids    ---------------------------------------------------PHYSICAL EXAM--------------------------------------    Constitutional/General: Alert and oriented x3, well appearing, non toxic in NAD  Head: Normocephalic and atraumatic  Eyes: PERRL, EOMI, conjunctiva is pale  Mouth: Oropharynx clear, handling secretions, no trismus  Neck: Supple, full ROM, non tender to palpation in the midline, no stridor, no crepitus, no meningeal signs  Pulmonary: Lungs clear to auscultation bilaterally, no wheezes, rales, or rhonchi. Not in respiratory distress  Cardiovascular:  Regular rate. Regular rhythm. No murmurs, gallops, or rubs. 2+ distal pulses  Chest: no chest wall tenderness  Abdomen: Soft. Non tender. Non distended. +BS. No rebound, guarding, or rigidity. No pulsatile masses appreciated. Musculoskeletal: Moves all extremities x 4. Warm and well perfused, no clubbing, cyanosis, or edema. Capillary refill <3 seconds  Skin: Cool and dry. No rashes. Patient is pallorous. Neurologic: GCS 15, CN 2-12 grossly intact, no focal deficits, symmetric strength 5/5 in the upper and lower extremities bilaterally  Psych: Normal Affect    -------------------------------------------------- RESULTS -------------------------------------------------  I have personally reviewed all laboratory and imaging results for this patient. Results are listed below.      LABS:  Results for orders placed or performed during the hospital encounter of 12/28/20   Troponin   Result Value Ref Range    Troponin <0.01 0.00 - 0.03 ng/mL   CBC   Result Value Ref Range    WBC 7.4 4.5 - 11.5 E9/L    RBC 2.08 (L) 3.50 - 5.50 E12/L    Hemoglobin 5.7 (LL) 11.5 - 15.5 g/dL    Hematocrit 19.8 (L) 34.0 - 48.0 %    MCV 95.2 80.0 - 99.9 fL    MCH 27.4 26.0 - 35.0 pg    MCHC 28.8 (L) 32.0 - 34.5 %    RDW 18.6 (H) 11.5 - 15.0 fL    Platelets 473 800 - 847 E9/L    MPV 10.0 7.0 - 12.0 fL   Basic Metabolic Panel   Result Value Ref Range    Sodium 135 132 - 146 mmol/L    Potassium 4.2 3.5 - 5.0 mmol/L    Chloride 103 98 - 107 mmol/L    CO2 21 (L) 22 - 29 mmol/L    Anion Gap 11 7 - 16 mmol/L    Glucose 234 (H) 74 - 99 mg/dL    BUN 50 (H) 8 - 23 mg/dL    CREATININE 2.8 (H) 0.5 - 1.0 mg/dL    GFR Non-African American 16 >=60 mL/min/1.73    GFR African American 20     Calcium 9.2 8.6 - 10.2 mg/dL   EKG 12 Lead   Result Value Ref Range    Ventricular Rate 65 BPM    Atrial Rate 94 BPM    P-R Interval 224 ms    QRS Duration 88 ms    Q-T Interval 376 ms    QTc Calculation (Bazett) 391 ms    P Axis 38 degrees    R Axis -21 degrees    T Axis 174 degrees   TYPE AND SCREEN   Result Value Ref Range    ABO/Rh O POS     Antibody Screen NEG    PREPARE RBC (CROSSMATCH), 2 Units   Result Value Ref Range    Product Code Blood Bank [id number]     Description Blood Bank Red Blood Cells, Apheresis, Leuko-reduced     Unit Number [id number]     Dispense Status Blood Bank issued     Product Code Blood Bank [id number] Description Blood Bank Red Blood Cells, Leuko-reduced     Unit Number [id number]     Dispense Status Blood Bank issued        RADIOLOGY:  Interpreted by Radiologist.  No orders to display         EKG Interpretation  Interpreted by emergency department physician    Rhythm: 2 degree AV block - type II  Rate: 65  Axis: normal  Conduction: 2nd degree AV block- type II  ST Segments: nonspecific changes  T Waves: non specific changes    Clinical Impression: myocardial ischemia and 2nd degree AV block-mobitz type II  Comparison to prior EKG: changes compared to previous EKG      ------------------------- NURSING NOTES AND VITALS REVIEWED ---------------------------   The nursing notes within the ED encounter and vital signs as below have been reviewed by myself. BP (!) 151/67   Pulse 79   Temp 98.5 °F (36.9 °C) (Oral)   Resp 15   Ht 5' 3\" (1.6 m)   Wt 115 lb (52.2 kg)   SpO2 100%   BMI 20.37 kg/m²   Oxygen Saturation Interpretation: Normal    The patients available past medical records and past encounters were reviewed. ------------------------------ ED COURSE/MEDICAL DECISION MAKING----------------------  Medications   0.9 % sodium chloride bolus (0 mLs Intravenous Stopped 12/28/20 1759)             Medical Decision Making:    Repeat CBC was performed. Patient still severely anemic.  2 units packed RBCs were ordered for transfusion in the emergency department. Anticipate her going home with follow-up with her hematologist.    ED Course as of Dec 28 1824   Mon Dec 28, 2020   1609 Patient states he is feeling better. Her color has improved currently getting her first unit of packed RBCs. She has no fever chills, chest pain shortness of breath or complaints. [JN]      ED Course User Index  [JN] Yandy Flores DO       Re-Evaluations:             Re-evaluation. Patients symptoms are improving VS stable No fevers reported.        Consultations:             NONE    Critical Care: CRITICAL CARE:   30 MINUTES. Please note that the withdrawal or failure to initiate urgent interventions for this patient would likely result in a life threatening deterioration or permanent disability. Accordingly this patient received the above mentioned time, excluding separately billable procedures. This patient's ED course included: a personal history and physicial examination, re-evaluation prior to disposition, multiple bedside re-evaluations, IV medications, cardiac monitoring, continuous pulse oximetry, complex medical decision making and emergency management, IV RBC transfusion. and a personal history and physicial eaxmination    This patient has remained hemodynamically stable, improved and been closely monitored during their ED course. Counseling: The emergency provider has spoken with the patient and discussed todays results, in addition to providing specific details for the plan of care and counseling regarding the diagnosis and prognosis. Questions are answered at this time and they are agreeable with the plan.       --------------------------------- IMPRESSION AND DISPOSITION ---------------------------------    IMPRESSION  1. Iron deficiency anemia, unspecified iron deficiency anemia type        DISPOSITION  Disposition: Discharge to nursing home  Patient condition is stable        NOTE: This report was transcribed using voice recognition software.  Every effort was made to ensure accuracy; however, inadvertent computerized transcription errors may be present       La Hanson DO  12/28/20 1824

## 2020-12-29 ENCOUNTER — CARE COORDINATION (OUTPATIENT)
Dept: CASE MANAGEMENT | Age: 75
End: 2020-12-29

## 2020-12-29 LAB
EKG ATRIAL RATE: 94 BPM
EKG P AXIS: 38 DEGREES
EKG P-R INTERVAL: 224 MS
EKG Q-T INTERVAL: 376 MS
EKG QRS DURATION: 88 MS
EKG QTC CALCULATION (BAZETT): 391 MS
EKG R AXIS: -21 DEGREES
EKG T AXIS: 174 DEGREES
EKG VENTRICULAR RATE: 65 BPM

## 2020-12-29 NOTE — CARE COORDINATION
[facility] Transitions Follow Up Call    2020    Patient: Richardson Gonzalez  Patient : 1945   MRN: <[id number]>  Reason for Admission:   Discharge Date: 20 RARS: Readmission Risk Score: 23    Follow Up:  Patient sent to ED on 20 for abnormal labs. Patient had c/o shortness of breath with exertion and chest pain. Attempted to contact patient for ED COVID risk follow up. Unable to reach patient. Left message with contact information and request for call back. No future appointments.     Adeel Romeo RN

## 2020-12-30 ENCOUNTER — CARE COORDINATION (OUTPATIENT)
Dept: CARE COORDINATION | Age: 75
End: 2020-12-30

## 2020-12-30 NOTE — CARE COORDINATION
[facility] Transitions Follow Up Call    2020    Patient: Krarie La  Patient : 1945   MRN: <[id number]>  Reason for Admission:   Discharge Date: 20 RARS: Readmission Risk Score: 21         Spoke with: Patient    Patient states she is doing well. No C/O chest pain, pressure, jaw pain, radiating pain down arms, or tightness with or without exertion. No SOB. Patient has all medications is taking medications as directed and has no questions concerning medications at this time. Explained the BPCI-A program and that the patient is followed for 90 days after discharge. Expresses understanding. Patient / family is aware of when to contact MD with any new or worsening symptoms. Will continue to follow. Ashwin Bravo LPN    [phone]  [facility] / [address] [place] [address] Transitions Subsequent and Final Call    Subsequent and Final Calls  Do you have any ongoing symptoms?: No  Have your medications changed?: No  Do you have any questions related to your medications?: No  Do you currently have any active services?: No  Do you have any needs or concerns that I can assist you with?: No  Identified Barriers: None  Care Transitions Interventions  Other Interventions: Follow Up  No future appointments.     Ashwin Brvao LPN

## 2021-01-07 ENCOUNTER — TELEPHONE (OUTPATIENT)
Dept: VASCULAR SURGERY | Age: 76
End: 2021-01-07

## 2021-01-07 NOTE — TELEPHONE ENCOUNTER
Received referral from [facility] for port insertion. Spoke with patient, scheduled appt with Dr. Nya Loving on 1-11-21 and port insertion on 1-13-21. She states she is having repeat lab work on 1-11-21.

## 2021-01-08 ENCOUNTER — HOSPITAL ENCOUNTER (OUTPATIENT)
Age: 76
Discharge: HOME | End: 2021-01-10
Payer: MEDICARE

## 2021-01-08 ENCOUNTER — TELEPHONE (OUTPATIENT)
Dept: VASCULAR SURGERY | Age: 76
End: 2021-01-08

## 2021-01-08 DIAGNOSIS — K55.20 ARTERIOVENOUS MALFORMATION OF SMALL INTESTINE: Primary | ICD-10-CM

## 2021-01-08 DIAGNOSIS — U07.1 COVID-19: ICD-10-CM

## 2021-01-08 PROCEDURE — U0003 INFECTIOUS AGENT DETECTION BY NUCLEIC ACID (DNA OR RNA); SEVERE ACUTE RESPIRATORY SYNDROME CORONAVIRUS 2 (SARS-COV-2) (CORONAVIRUS DISEASE [COVID-19]), AMPLIFIED PROBE TECHNIQUE, MAKING USE OF HIGH THROUGHPUT TECHNOLOGIES AS DESCRIBED BY CMS-2020-01-R: HCPCS

## 2021-01-08 PROCEDURE — U0002 COVID-19 LAB TEST NON-CDC: HCPCS

## 2021-01-08 RX ORDER — GLIMEPIRIDE 4 MG/1
4 TABLET ORAL DAILY
Status: ON HOLD | COMMUNITY
End: 2021-01-23 | Stop reason: HOSPADM

## 2021-01-08 NOTE — TELEPHONE ENCOUNTER
Called to confirm appointment for 1/11/21 at 230, left message with date, time, and phone number for patient.

## 2021-01-08 NOTE — PROGRESS NOTES
Patient agreed to COVID test on 1-8 at the  [facility] ([facility])   located at  off of [address]between the hours of 6 am- 2:30 pm, instructed to bring ID. Patient instructed to self isolate until day of surgery.

## 2021-01-08 NOTE — PROGRESS NOTES
[] CPAP/BI-PAP: Please bring your machine if you are to spend the night in the hospital.     PARKING INSTRUCTIONS:   [x] Arrival Time:__0500 via [address] door___________  · [] Parking lot '\"I\"  is located on [facility] (the corner of [facility] and [facility]). To enter, press the button and the gate will lift. A free token will be provided to exit the lot. One car per patient is allowed to park in this lot. All other cars are to park on [address] [place] [address] Street either in the parking garage or the handicap lot. [] To reach the [facility] lobby from [address] [place] [address], upon entering the hospital, take elevator B to the 3rd floor. EDUCATION INSTRUCTIONS:      [] Knee or hip replacement booklet & exercise pamphlets given. [] [address] placed in chart. [] Pre-admission Testing educational folder given  [] Incentive Spirometry,coughing & deep breathing exercises reviewed. []Medication information sheet(s)   []Fluoroscopy-Xray used in surgery reviewed with patient. Educational pamphlet placed in chart. []Pain: Post-op pain is normal and to be expected. You will be asked to rate your pain from 0-10(a zero is not acceptable-education is needed). Your post-op pain goal is:  [] Ask your nurse for your pain medication. [] Joint camp offered. [] Joint replacement booklets given. [] Other:___________________________    MEDICATION INSTRUCTIONS:   [x]Bring a complete list of your medications, please write the last time you took the medicine, give this list to the nurse. [x] Take the following medications the morning of surgery with 1-2 ounces of water:   [x] Stop herbal supplements and vitamins 5 days before your surgery. [x] DO NOT take any diabetic medicine the morning of surgery. Follow instructions for insulin the day before surgery. [] If you are diabetic and your blood sugar is low or you feel symptomatic, you may drink 1-2 ounces of apple juice or take a glucose tablet. The morning of your procedure, you may call the pre-op area if you have concerns about your blood sugar [phone]. [x] Use your inhalers the morning of surgery. Bring your emergency inhaler with you day of surgery. [x] Follow physician instructions regarding any blood thinners you may be taking. sonali per dr Florencio Sahni on monday  WHAT TO EXPECT:  [x] The day of surgery you will be greeted and checked in by the [facility] .  In addition, you will be registered in the [facility] by a Patient Access Representative. Please bring your photo ID and insurance card. A nurse will greet you in accordance to the time you are needed in the pre-op area to prepare you for surgery. Please do not be discouraged if you are not greeted in the order you arrive as there are many variables that are involved in patient preparation. Your patience is greatly appreciated as you wait for your nurse. Please bring in items such as: books, magazines, newspapers, electronics, or any other items  to occupy your time in the waiting area. []  Delays may occur with surgery and staff will make a sincere effort to keep you informed of delays. If any delays occur with your procedure, we apologize ahead of time for your inconvenience as we recognize the value of your time.

## 2021-01-10 LAB — SARS-COV-2, PCR: NOT DETECTED

## 2021-01-12 ENCOUNTER — CARE COORDINATION (OUTPATIENT)
Dept: CASE MANAGEMENT | Age: 76
End: 2021-01-12

## 2021-01-12 ENCOUNTER — OFFICE VISIT (OUTPATIENT)
Dept: VASCULAR SURGERY | Age: 76
End: 2021-01-12
Payer: MEDICARE

## 2021-01-12 ENCOUNTER — PREP FOR PROCEDURE (OUTPATIENT)
Dept: VASCULAR SURGERY | Age: 76
End: 2021-01-12

## 2021-01-12 VITALS
SYSTOLIC BLOOD PRESSURE: 110 MMHG | BODY MASS INDEX: 19.84 KG/M2 | WEIGHT: 112 LBS | HEIGHT: 63 IN | DIASTOLIC BLOOD PRESSURE: 60 MMHG

## 2021-01-12 DIAGNOSIS — I87.8 POOR VENOUS ACCESS: ICD-10-CM

## 2021-01-12 DIAGNOSIS — D50.0 IRON DEFICIENCY ANEMIA DUE TO CHRONIC BLOOD LOSS: Primary | Chronic | ICD-10-CM

## 2021-01-12 PROCEDURE — G8420 CALC BMI NORM PARAMETERS: HCPCS | Performed by: SURGERY

## 2021-01-12 PROCEDURE — 3017F COLORECTAL CA SCREEN DOC REV: CPT | Performed by: SURGERY

## 2021-01-12 PROCEDURE — 4040F PNEUMOC VAC/ADMIN/RCVD: CPT | Performed by: SURGERY

## 2021-01-12 PROCEDURE — 1090F PRES/ABSN URINE INCON ASSESS: CPT | Performed by: SURGERY

## 2021-01-12 PROCEDURE — 1036F TOBACCO NON-USER: CPT | Performed by: SURGERY

## 2021-01-12 PROCEDURE — 99204 OFFICE O/P NEW MOD 45 MIN: CPT | Performed by: SURGERY

## 2021-01-12 PROCEDURE — G8400 PT W/DXA NO RESULTS DOC: HCPCS | Performed by: SURGERY

## 2021-01-12 PROCEDURE — G8484 FLU IMMUNIZE NO ADMIN: HCPCS | Performed by: SURGERY

## 2021-01-12 PROCEDURE — 1123F ACP DISCUSS/DSCN MKR DOCD: CPT | Performed by: SURGERY

## 2021-01-12 PROCEDURE — G8427 DOCREV CUR MEDS BY ELIG CLIN: HCPCS | Performed by: SURGERY

## 2021-01-12 RX ORDER — SODIUM CHLORIDE 9 MG/ML
INJECTION, SOLUTION INTRAVENOUS CONTINUOUS
Status: CANCELLED | OUTPATIENT
Start: 2021-01-12

## 2021-01-12 RX ORDER — SODIUM CHLORIDE 0.9 % (FLUSH) 0.9 %
10 SYRINGE (ML) INJECTION EVERY 12 HOURS SCHEDULED
Status: CANCELLED | OUTPATIENT
Start: 2021-01-12

## 2021-01-12 RX ORDER — SODIUM CHLORIDE 0.9 % (FLUSH) 0.9 %
10 SYRINGE (ML) INJECTION PRN
Status: CANCELLED | OUTPATIENT
Start: 2021-01-12

## 2021-01-12 NOTE — CARE COORDINATION
Attempted to reach pt for BPCI-A follow up call. Left message requesting call back.     Radha Mason RN  Care Transition Coordinator  [phone]

## 2021-01-12 NOTE — PROGRESS NOTES
tablet by mouth 2 times daily 60 tablet 5    empagliflozin (JARDIANCE) 25 MG tablet Take 25 mg by mouth daily      raloxifene (EVISTA) 60 MG tablet Take 60 mg by mouth nightly      magnesium oxide (MAG-OX) 400 MG tablet Take 400 mg by mouth daily.  montelukast (SINGULAIR) 10 MG tablet Take 10 mg by mouth nightly.  fenofibrate 160 MG tablet Take 160 mg by mouth nightly.  Cholecalciferol (VITAMIN D-3 PO) Take 2,000 Units by mouth daily       fluticasone-salmeterol (ADVAIR) 250-50 MCG/DOSE AEPB Inhale 1 puff into the lungs as needed. Is weaning off as of 2/5/2014       No current facility-administered medications for this visit.         Past Medical History:   Diagnosis Date    Anemia     4/2014 put on iron supplement    Arthritis     Asthma     Bronchial asthma, getting better, going to Dr. Qing Velázquez,  weaning off allergy shots and inhalers    Chronic systolic (congestive) heart failure ([facility] [address] 12/03/2020    Colon polyps 02/20/2014    Diabetes ([facility] [address]     Environmental allergies     spring/winter    Full dentures     Gastric ulcer 02/20/2014    GERD (gastroesophageal reflux disease)     High cholesterol     History of blood transfusion     Hypertension     124/70, has been good    Osteopenia     Poor venous access 1/12/2021       Past Surgical History:   Procedure Laterality Date    APPENDECTOMY      CARDIAC CATHETERIZATION  11/11/2020    Dr Amish Santos, OPEN  1980's early    COLONOSCOPY N/A 12/05/2020    COLONOSCOPY POLYPECTOMY SNARE/COLD BIOPSY performed by Kay Montoya MD at [place] [address]  11/11/2020    DR Ashley Aguilar Resolute [id number]  3.0x 22prox    ENDOSCOPY, COLON, DIAGNOSTIC      KNEE ARTHROCENTESIS      KNEE ARTHROSCOPY Left 1980's    OTHER SURGICAL HISTORY  10/13/2014    antrogade balloon enteroscopy with biopsy of polyp and scleratherapy    TUBAL LIGATION      UPPER GASTROINTESTINAL ENDOSCOPY  04/18/2014    UPPER GASTROINTESTINAL ENDOSCOPY N/A 12/04/2020    EGD ESOPHAGOGASTRODUODENOSCOPY performed by Laurinda Mohs, MD at [place] [address] History   Problem Relation Age of Onset    Stroke Mother     Cancer Father         colon    Heart Disease Father        Social History     Socioeconomic History    Marital status:      Spouse name: Not on file    Number of children: Not on file    Years of education: Not on file    Highest education level: Not on file   Occupational History    Not on file   Social Needs    Financial resource strain: Not on file    Food insecurity     Worry: Not on file     Inability: Not on file    Transportation needs     Medical: Not on file     Non-medical: Not on file   Tobacco Use    Smoking status: Never Smoker    Smokeless tobacco: Never Used   Substance and Sexual Activity    Alcohol use:  Yes     Alcohol/week: 0.0 standard drinks     Comment: socially    Drug use: No    Sexual activity: Not Currently     Partners: Male   Lifestyle    Physical activity     Days per week: Not on file     Minutes per session: Not on file    Stress: Not on file   Relationships    Social connections     Talks on phone: Not on file     Gets together: Not on file     Attends Confucianist service: Not on file     Active member of club or organization: Not on file     Attends meetings of clubs or organizations: Not on file     Relationship status: Not on file    Intimate partner violence     Fear of current or ex partner: Not on file     Emotionally abused: Not on file     Physically abused: Not on file     Forced sexual activity: Not on file   Other Topics Concern    Not on file   Social History Narrative    Not on file       Review of Systems:  Skin:  No abnormal pigmentation or rash  Eyes:  No blurring, diplopia or vision loss  Ears/Nose/Throat:  No hearing loss or vertigo  Respiratory:  No cough, pleuritic chest pain, dyspnea, or wheezing. Cardiovascular: No angina, palpitations . Coronary artery disease, history of angioplasty and stent placement, hypertension, hyperlipidemia  Gastrointestinal:  No nausea or vomiting; no abdominal pain or rectal bleeding. GERD  Musculoskeletal:  No arthritis or weakness. Neurologic:  No paralysis, paresis, paresthesia, seizures or headaches  Hematologic/Lymphatic/Immunologic:  No anemia, abnormal bleeding/bruising, fever, chills or night sweats. Chronic anemia due to chronic blood loss and iron deficiency anemia due to AV malformations bleeding and a frequent basis  Endocrine:  No heat or cold intolerance. No polyphagia, polydipsia or polyuria. Diabetes mellitus      Physical Exam:  General appearance:  Alert, awake, oriented x 3. No distress. Skin:  Warm and dry  Head:  Normocephalic. No masses, lesions or tenderness  Eyes:  Conjunctivae appear normal; PERRL  Ears:  External ears normal  Nose/Sinuses:  Septum midline, mucosa normal; no drainage  Oropharynx:  Clear, no exudate noted  Neck:  No jugular venous distention, lymphadenopathy or thyromegaly. No evidence of carotid bruit  Lungs:  Clear to ausculation bilaterally. No rhonchi, crackles, wheezes  Heart:  Regular rate and rhythm. No rub or murmur  Abdomen:  Soft, non-tender. No masses, organomegaly. Musculoskeletal : No joint effusions, tenderness swelling    Neuro: Speech is intact. Moving all extremities. No focal motor or sensory deficits      Extremities:  Both feet are warm to touch. The color of both feet is normal.        Pulses Right  Left    Brachial 3 3    Radial    3=normal   Femoral 2 2  2=diminished   Popliteal    1=barely palpable   Dorsalis pedis    0=absent   Posterior tibial    4=aneurysmal             Other pertinent information:1. The past medical records were reviewed. 2.  The medical records of her oncologist office were reviewed    Assessment:    1. Iron deficiency anemia due to chronic blood loss    2.  Poor venous access    3. AV malformations of the GI tract  4. Coronary artery disease, post coronary artery angioplasty stent placement, currently on aspirin and Brilinta          Plan:       Detailed discussion with patient, options, risks benefits and alternatives explained, patient was recommended to proceed with insertion of venous port as instructed by her hematologist  Dr. Maribel Arroyo      The risks, benefits, options and alternatives were clearly explained including bleeding, clotting, infection, arterial, venous, nerve, cardiopulmonary complications, up to 2% plus chance of major complications which they understand and consent for surgery. All  questions were answered        Indicated follow-up: Return if symptoms worsen or fail to improve.       CC to Dr. Maribel Arroyo

## 2021-01-13 ENCOUNTER — HOSPITAL ENCOUNTER (OUTPATIENT)
Age: 76
Setting detail: OUTPATIENT SURGERY
Discharge: HOME | End: 2021-01-13
Attending: SURGERY | Admitting: SURGERY
Payer: MEDICARE

## 2021-01-13 ENCOUNTER — ANESTHESIA EVENT (OUTPATIENT)
Dept: OPERATING ROOM | Age: 76
End: 2021-01-13
Payer: MEDICARE

## 2021-01-13 ENCOUNTER — APPOINTMENT (OUTPATIENT)
Dept: GENERAL RADIOLOGY | Age: 76
End: 2021-01-13
Attending: SURGERY
Payer: MEDICARE

## 2021-01-13 ENCOUNTER — ANESTHESIA (OUTPATIENT)
Dept: OPERATING ROOM | Age: 76
End: 2021-01-13
Payer: MEDICARE

## 2021-01-13 VITALS — OXYGEN SATURATION: 100 % | SYSTOLIC BLOOD PRESSURE: 114 MMHG | DIASTOLIC BLOOD PRESSURE: 48 MMHG

## 2021-01-13 VITALS
BODY MASS INDEX: 20.38 KG/M2 | WEIGHT: 115 LBS | SYSTOLIC BLOOD PRESSURE: 137 MMHG | HEIGHT: 63 IN | TEMPERATURE: 97.3 F | RESPIRATION RATE: 14 BRPM | DIASTOLIC BLOOD PRESSURE: 63 MMHG | OXYGEN SATURATION: 100 % | HEART RATE: 73 BPM

## 2021-01-13 DIAGNOSIS — Z01.818 PREOP TESTING: ICD-10-CM

## 2021-01-13 DIAGNOSIS — U07.1 COVID-19: Primary | ICD-10-CM

## 2021-01-13 DIAGNOSIS — Z45.2 ENCOUNTER FOR INSERTION OF VENOUS ACCESS PORT: ICD-10-CM

## 2021-01-13 DIAGNOSIS — Z98.890 POST-OPERATIVE STATE: ICD-10-CM

## 2021-01-13 LAB
ANION GAP SERPL CALCULATED.3IONS-SCNC: 13 MMOL/L (ref 7–16)
APTT: 26.6 SEC (ref 24.5–35.1)
BUN BLDV-MCNC: 30 MG/DL (ref 8–23)
CALCIUM SERPL-MCNC: 8.9 MG/DL (ref 8.6–10.2)
CHLORIDE BLD-SCNC: 109 MMOL/L (ref 98–107)
CHP ED QC CHECK: NORMAL
CO2: 21 MMOL/L (ref 22–29)
CREAT SERPL-MCNC: 2.6 MG/DL (ref 0.5–1)
GFR AFRICAN AMERICAN: 22
GFR NON-AFRICAN AMERICAN: 18 ML/MIN/1.73
GLUCOSE BLD-MCNC: 54 MG/DL
GLUCOSE BLD-MCNC: 65 MG/DL (ref 74–99)
HCT VFR BLD CALC: 32.4 % (ref 34–48)
HEMOGLOBIN: 10.1 G/DL (ref 11.5–15.5)
INR BLD: 1.1
MAGNESIUM: 1.8 MG/DL (ref 1.6–2.6)
MCH RBC QN AUTO: 29.7 PG (ref 26–35)
MCHC RBC AUTO-ENTMCNC: 31.2 % (ref 32–34.5)
MCV RBC AUTO: 95.3 FL (ref 80–99.9)
METER GLUCOSE: 180 MG/DL (ref 74–99)
METER GLUCOSE: 54 MG/DL (ref 74–99)
PDW BLD-RTO: 18.7 FL (ref 11.5–15)
PLATELET # BLD: 248 E9/L (ref 130–450)
PMV BLD AUTO: 9.2 FL (ref 7–12)
POTASSIUM REFLEX MAGNESIUM: 3.5 MMOL/L (ref 3.5–5)
PROTHROMBIN TIME: 12.7 SEC (ref 9.3–12.4)
RBC # BLD: 3.4 E12/L (ref 3.5–5.5)
SODIUM BLD-SCNC: 143 MMOL/L (ref 132–146)
WBC # BLD: 8 E9/L (ref 4.5–11.5)

## 2021-01-13 PROCEDURE — 6360000002 HC RX W HCPCS: Performed by: NURSE ANESTHETIST, CERTIFIED REGISTERED

## 2021-01-13 PROCEDURE — 2709999900 HC NON-CHARGEABLE SUPPLY: Performed by: SURGERY

## 2021-01-13 PROCEDURE — 85027 COMPLETE CBC AUTOMATED: CPT

## 2021-01-13 PROCEDURE — 2580000003 HC RX 258: Performed by: SURGERY

## 2021-01-13 PROCEDURE — 3209999900 FLUORO FOR SURGICAL PROCEDURES

## 2021-01-13 PROCEDURE — 3600000003 HC SURGERY LEVEL 3 BASE: Performed by: SURGERY

## 2021-01-13 PROCEDURE — 80048 BASIC METABOLIC PNL TOTAL CA: CPT

## 2021-01-13 PROCEDURE — 2500000003 HC RX 250 WO HCPCS: Performed by: SURGERY

## 2021-01-13 PROCEDURE — 6360000002 HC RX W HCPCS: Performed by: SURGERY

## 2021-01-13 PROCEDURE — 77001 FLUOROGUIDE FOR VEIN DEVICE: CPT | Performed by: SURGERY

## 2021-01-13 PROCEDURE — 85610 PROTHROMBIN TIME: CPT

## 2021-01-13 PROCEDURE — 85730 THROMBOPLASTIN TIME PARTIAL: CPT

## 2021-01-13 PROCEDURE — 83735 ASSAY OF MAGNESIUM: CPT

## 2021-01-13 PROCEDURE — 3700000001 HC ADD 15 MINUTES (ANESTHESIA): Performed by: SURGERY

## 2021-01-13 PROCEDURE — 36415 COLL VENOUS BLD VENIPUNCTURE: CPT

## 2021-01-13 PROCEDURE — 3700000000 HC ANESTHESIA ATTENDED CARE: Performed by: SURGERY

## 2021-01-13 PROCEDURE — 7100000010 HC PHASE II RECOVERY - FIRST 15 MIN: Performed by: SURGERY

## 2021-01-13 PROCEDURE — 36561 INSERT TUNNELED CV CATH: CPT | Performed by: SURGERY

## 2021-01-13 PROCEDURE — 7100000011 HC PHASE II RECOVERY - ADDTL 15 MIN: Performed by: SURGERY

## 2021-01-13 PROCEDURE — C1788 PORT, INDWELLING, IMP: HCPCS | Performed by: SURGERY

## 2021-01-13 PROCEDURE — 3600000013 HC SURGERY LEVEL 3 ADDTL 15MIN: Performed by: SURGERY

## 2021-01-13 PROCEDURE — 82962 GLUCOSE BLOOD TEST: CPT

## 2021-01-13 DEVICE — PORT INFUS 8FR PWR INJ CT FOR VASC ACCS CATH: Type: IMPLANTABLE DEVICE | Status: FUNCTIONAL

## 2021-01-13 RX ORDER — SODIUM CHLORIDE 9 MG/ML
INJECTION, SOLUTION INTRAVENOUS CONTINUOUS
Status: DISCONTINUED | OUTPATIENT
Start: 2021-01-13 | End: 2021-01-13 | Stop reason: HOSPADM

## 2021-01-13 RX ORDER — SODIUM CHLORIDE 0.9 % (FLUSH) 0.9 %
10 SYRINGE (ML) INJECTION PRN
Status: DISCONTINUED | OUTPATIENT
Start: 2021-01-13 | End: 2021-01-13 | Stop reason: HOSPADM

## 2021-01-13 RX ORDER — HYDROCODONE BITARTRATE AND ACETAMINOPHEN 5; 325 MG/1; MG/1
2 TABLET ORAL PRN
Status: DISCONTINUED | OUTPATIENT
Start: 2021-01-13 | End: 2021-01-13 | Stop reason: HOSPADM

## 2021-01-13 RX ORDER — HYDROCODONE BITARTRATE AND ACETAMINOPHEN 5; 325 MG/1; MG/1
1 TABLET ORAL PRN
Status: DISCONTINUED | OUTPATIENT
Start: 2021-01-13 | End: 2021-01-13 | Stop reason: HOSPADM

## 2021-01-13 RX ORDER — LIDOCAINE HYDROCHLORIDE 10 MG/ML
INJECTION, SOLUTION INFILTRATION; PERINEURAL PRN
Status: DISCONTINUED | OUTPATIENT
Start: 2021-01-13 | End: 2021-01-13 | Stop reason: ALTCHOICE

## 2021-01-13 RX ORDER — HEPARIN SODIUM (PORCINE) LOCK FLUSH IV SOLN 100 UNIT/ML 100 UNIT/ML
SOLUTION INTRAVENOUS PRN
Status: DISCONTINUED | OUTPATIENT
Start: 2021-01-13 | End: 2021-01-13 | Stop reason: ALTCHOICE

## 2021-01-13 RX ORDER — DEXTROSE MONOHYDRATE 25 G/50ML
12.5 INJECTION, SOLUTION INTRAVENOUS ONCE
Status: COMPLETED | OUTPATIENT
Start: 2021-01-13 | End: 2021-01-13

## 2021-01-13 RX ORDER — PROPOFOL 10 MG/ML
INJECTION, EMULSION INTRAVENOUS CONTINUOUS PRN
Status: DISCONTINUED | OUTPATIENT
Start: 2021-01-13 | End: 2021-01-13 | Stop reason: SDUPTHER

## 2021-01-13 RX ORDER — LIDOCAINE HYDROCHLORIDE 20 MG/ML
INJECTION, SOLUTION INTRAVENOUS PRN
Status: DISCONTINUED | OUTPATIENT
Start: 2021-01-13 | End: 2021-01-13 | Stop reason: SDUPTHER

## 2021-01-13 RX ORDER — SODIUM CHLORIDE 0.9 % (FLUSH) 0.9 %
10 SYRINGE (ML) INJECTION EVERY 12 HOURS SCHEDULED
Status: DISCONTINUED | OUTPATIENT
Start: 2021-01-13 | End: 2021-01-13 | Stop reason: HOSPADM

## 2021-01-13 RX ORDER — FENTANYL CITRATE 50 UG/ML
INJECTION, SOLUTION INTRAMUSCULAR; INTRAVENOUS PRN
Status: DISCONTINUED | OUTPATIENT
Start: 2021-01-13 | End: 2021-01-13 | Stop reason: SDUPTHER

## 2021-01-13 RX ORDER — MIDAZOLAM HYDROCHLORIDE 1 MG/ML
INJECTION INTRAMUSCULAR; INTRAVENOUS PRN
Status: DISCONTINUED | OUTPATIENT
Start: 2021-01-13 | End: 2021-01-13 | Stop reason: SDUPTHER

## 2021-01-13 RX ORDER — OXYCODONE HYDROCHLORIDE AND ACETAMINOPHEN 5; 325 MG/1; MG/1
1 TABLET ORAL EVERY 6 HOURS PRN
Qty: 28 TABLET | Refills: 0 | Status: SHIPPED | OUTPATIENT
Start: 2021-01-13 | End: 2021-01-16

## 2021-01-13 RX ORDER — PROPOFOL 10 MG/ML
INJECTION, EMULSION INTRAVENOUS PRN
Status: DISCONTINUED | OUTPATIENT
Start: 2021-01-13 | End: 2021-01-13 | Stop reason: SDUPTHER

## 2021-01-13 RX ORDER — DEXTROSE AND SODIUM CHLORIDE 5; .45 G/100ML; G/100ML
INJECTION, SOLUTION INTRAVENOUS CONTINUOUS
Status: DISCONTINUED | OUTPATIENT
Start: 2021-01-13 | End: 2021-01-13 | Stop reason: HOSPADM

## 2021-01-13 RX ADMIN — SODIUM CHLORIDE: 9 INJECTION, SOLUTION INTRAVENOUS at 05:59

## 2021-01-13 RX ADMIN — MIDAZOLAM 2 MG: 1 INJECTION INTRAMUSCULAR; INTRAVENOUS at 08:23

## 2021-01-13 RX ADMIN — PROPOFOL 100 MCG/KG/MIN: 10 INJECTION, EMULSION INTRAVENOUS at 08:28

## 2021-01-13 RX ADMIN — PROPOFOL 30 MG: 10 INJECTION, EMULSION INTRAVENOUS at 08:28

## 2021-01-13 RX ADMIN — DEXTROSE AND SODIUM CHLORIDE: 5; 450 INJECTION, SOLUTION INTRAVENOUS at 06:22

## 2021-01-13 RX ADMIN — Medication 2 G: at 08:30

## 2021-01-13 RX ADMIN — LIDOCAINE HYDROCHLORIDE 20 MG: 20 INJECTION, SOLUTION INTRAVENOUS at 08:28

## 2021-01-13 RX ADMIN — DEXTROSE MONOHYDRATE 12.5 G: 25 INJECTION, SOLUTION INTRAVENOUS at 06:23

## 2021-01-13 RX ADMIN — FENTANYL CITRATE 25 MCG: 50 INJECTION, SOLUTION INTRAMUSCULAR; INTRAVENOUS at 08:28

## 2021-01-13 RX ADMIN — FENTANYL CITRATE 25 MCG: 50 INJECTION, SOLUTION INTRAMUSCULAR; INTRAVENOUS at 08:44

## 2021-01-13 RX ADMIN — FENTANYL CITRATE 50 MCG: 50 INJECTION, SOLUTION INTRAMUSCULAR; INTRAVENOUS at 09:05

## 2021-01-13 ASSESSMENT — PAIN SCALES - GENERAL
PAINLEVEL_OUTOF10: 0

## 2021-01-13 NOTE — OP NOTE
Operative Note      Patient: Wei Terry  YOB: 1945  MRN: [id number]    Date of Procedure: 1/13/2021    Pre-Op Diagnosis: 1. Poor venous access    Post-Op Diagnosis: Same       Procedure(s):  PORT INSERTION    Surgeon(s):  Simon Rowley MD    Assistant:   Surgical Assistant: Conrad Jacobo    Anesthesia: Monitor Anesthesia Care    Estimated Blood Loss (mL): Minimal    Complications: None    Specimens:   * No specimens in log *    Implants:  Implant Name Type Inv. Item Serial No.  Lot No. LRB No. Used Action   PORT INFUS 8FR PWR INJ CT FOR VASC ACCS CATH  PORT INFUS 8FR PWR INJ CT FOR VASC ACCS CATH  [employer] [id number] Left 1 Implanted         Drains: * No LDAs found *    Findings:     Detailed Description of Procedure:         PROCEDURE:  With the patient in the supine position, the right side of the   neck and chest were prepped and draped in the usual fashion. One percent   Xylocaine was used for local anesthesia. A horizontal incision of 2 cm in length was made over the right subclavian   fossa where a subcutaneous pocket of 2 x 2 cm was created for the placement   of the venous port. Next, a 1-cm incision was made over the right external   jugular vein and deepened down to the platysma where the vein was dissected   free and ligated proximally with 3-0 silk. Through a venotomy, the tip of   the catheter was passed into the superior vena cava and the right atrium   under fluoroscopy, after which the vein was ligated distally with 3-0 silk. The catheter was tunneled subcutaneously and connected to the port, from   where blood could be aspirated. After which, the port was flushed with   heparin solution. Hemostasis was secured. The incision was closed in layers by approximating the subcu with 3-0 Vicryl   and the skin with 5-0 Vicryl, dressed with Dermabond, and the patient was   transferred back to the recovery room in stable condition.   The blood loss was minimal.        Vickie Horton M.D.       Electronically signed by Vickie Horton MD on 1/13/2021 at 9:15 AM

## 2021-01-13 NOTE — ANESTHESIA PRE PROCEDURE
Department of Anesthesiology  Preprocedure Note       Name:  Placido Oakes   Age:  77 y.o.  :  1945                                          MRN:  [id number]         Date:  2021      Surgeon: Gray Montiel):  Mustapha Ramachandran MD    Procedure: Procedure(s):  PORT INSERTION    Medications prior to admission:   Prior to Admission medications    Medication Sig Start Date End Date Taking? Authorizing Provider   glimepiride (AMARYL) 4 MG tablet Take 4 mg by mouth every morning (before breakfast)    Historical Provider, MD   nitroGLYCERIN (NITROSTAT) 0.4 MG SL tablet up to max of 3 total doses. If no relief after 1 dose, call 911. 20   Amy Yu MD   pantoprazole (PROTONIX) 40 MG tablet Take 1 tablet by mouth every morning (before breakfast) 20   Amy Yu MD   isosorbide dinitrate (ISORDIL) 20 MG tablet Take 1 tablet by mouth 2 times daily  Patient taking differently: Take 10 mg by mouth 2 times daily  20   Dorina Pugh MD   hydrALAZINE (APRESOLINE) 25 MG tablet Take 1 tablet by mouth 2 times daily 20   Dorina Pugh MD   aspirin 81 MG EC tablet Take 1 tablet by mouth daily 20   Amy Yu MD   metFORMIN (GLUCOPHAGE) 500 MG tablet Take 3 tablets by mouth 2 times daily (with meals) Resume this on 20 with supper dose. 20   Amy Yu MD   ezetimibe (ZETIA) 10 MG tablet Take 1 tablet by mouth nightly 20   Amy Yu MD   ticagrelor [facility]) 90 MG TABS tablet Take 1 tablet by mouth 2 times daily 20   Amy Yu MD   empagliflozin (JARDIANCE) 25 MG tablet Take 25 mg by mouth daily    Historical Provider, MD   raloxifene (EVISTA) 60 MG tablet Take 60 mg by mouth nightly    Historical Provider, MD   magnesium oxide (MAG-OX) 400 MG tablet Take 400 mg by mouth daily. Historical Provider, MD   montelukast (SINGULAIR) 10 MG tablet Take 10 mg by mouth nightly.     Historical Provider, MD fenofibrate 160 MG tablet Take 160 mg by mouth nightly. Historical Provider, MD   Cholecalciferol (VITAMIN D-3 PO) Take 2,000 Units by mouth daily     Historical Provider, MD   fluticasone-salmeterol (ADVAIR) 250-50 MCG/DOSE AEPB Inhale 1 puff into the lungs as needed. Is weaning off as of 2/5/2014    Historical Provider, MD       Current medications:    No current facility-administered medications for this visit. No current outpatient medications on file. Facility-Administered Medications Ordered in Other Visits   Medication Dose Route Frequency Provider Last Rate Last Admin    0.9 % sodium chloride infusion   Intravenous Continuous Adam Contreras MD 50 mL/hr at 01/13/21 0559 New Bag at 01/13/21 0559    ceFAZolin (ANCEF) 2 g in sterile water 20 mL IV syringe  2 g Intravenous On Call to [facility], MD        sodium chloride flush 0.9 % injection 10 mL  10 mL Intravenous 2 times per day Adam Contreras MD        sodium chloride flush 0.9 % injection 10 mL  10 mL Intravenous PRN Adam Contreras MD        dextrose 5 % and 0.45 % sodium chloride infusion   Intravenous Continuous Adam Contreras MD 75 mL/hr at 01/13/21 0622 New Bag at 01/13/21 1326       Allergies:     Allergies   Allergen Reactions    Aspirin Hives    Nsaids Rash    Statins Other (See Comments)     Muscle weakness       Problem List:    Patient Active Problem List   Diagnosis Code    Colon polyps K63.5    Essential hypertension I10    Type 2 diabetes mellitus ([facility] [address] E11.9    Mitral regurgitation I34.0    Stage 3b chronic kidney disease N18.32    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    UGIB (upper gastrointestinal bleed) K92.2    Encounter for monitoring antiplatelet therapy D24.42, Z79.02    Ischemic cardiomyopathy I25.5    Chronic systolic (congestive) heart failure (HCC) I50.22    Moderate malnutrition (HCC) E44.0    Acute blood loss anemia D62    Anemia of chronic kidney failure N18.9, D63.1  Iron deficiency anemia due to chronic blood loss D50.0    Mobitz type 1 second degree atrioventricular block I44.1    Poor venous access I87.8       Past Medical History:        Diagnosis Date    Anemia     4/2014 put on iron supplement    Arthritis     Asthma     Bronchial asthma, getting better, going to Dr. Issac Antonio,  weaning off allergy shots and inhalers    Chronic systolic (congestive) heart failure ([facility] [address] 12/03/2020    Colon polyps 02/20/2014    Diabetes ([facility] [address]     Environmental allergies     spring/winter    Full dentures     Gastric ulcer 02/20/2014    GERD (gastroesophageal reflux disease)     High cholesterol     History of blood transfusion     Hypertension     124/70, has been good    Osteopenia     Poor venous access 1/12/2021       Past Surgical History:        Procedure Laterality Date    APPENDECTOMY      CARDIAC CATHETERIZATION  11/11/2020    Dr Tiffanie Irene, OPEN  1980's early    COLONOSCOPY N/A 12/05/2020    COLONOSCOPY POLYPECTOMY SNARE/COLD BIOPSY performed by Gabino James MD at [place] [address]  11/11/2020    DR Kong Pope Resolute [id number]  3.0x 22prox    ENDOSCOPY, COLON, DIAGNOSTIC      KNEE ARTHROCENTESIS      KNEE ARTHROSCOPY Left 1980's    OTHER SURGICAL HISTORY  10/13/2014    antrogade balloon enteroscopy with biopsy of polyp and scleratherapy    TUBAL LIGATION      UPPER GASTROINTESTINAL ENDOSCOPY  04/18/2014    UPPER GASTROINTESTINAL ENDOSCOPY N/A 12/04/2020    EGD ESOPHAGOGASTRODUODENOSCOPY performed by Kiran Muse MD at [facility] History:    Social History     Tobacco Use    Smoking status: Never Smoker    Smokeless tobacco: Never Used   Substance Use Topics    Alcohol use:  Yes     Alcohol/week: 0.0 standard drinks     Comment: socially                                Counseling given: Not Answered      Vital Signs (Current): 3. Cardiomegaly.  There are no findings of failure        Anesthesia Evaluation  Patient summary reviewed and Nursing notes reviewed  Airway: Mallampati: I  TM distance: >3 FB   Neck ROM: full  Mouth opening: > = 3 FB Dental:    (+) upper dentures and lower dentures  Comment: Upper and lower dentures    Pulmonary: breath sounds clear to auscultation  (+) asthma: seasonal asthma,                            Cardiovascular:  Exercise tolerance: poor (<4 METS),   (+) hypertension:, valvular problems/murmurs: MR, past MI ( MI 11/2020 ): 0-3 months, CHF (Ejection fraction is visually estimated at 85-23%.): systolic, hyperlipidemia    CABG/stent:  2 stents 11/2020. ECG reviewed  Rhythm: regular  Rate: normal  Echocardiogram reviewed  Stress test reviewed       Beta Blocker:  Dose within 24 Hrs         Neuro/Psych:   Negative Neuro/Psych ROS              GI/Hepatic/Renal:   (+) GERD ( PT states does not have reflux anymore): well controlled, PUD, renal disease: CRI,           Endo/Other:    (+) DiabetesType II DM, well controlled, , blood dyscrasia (iron deficiency anemia, Brilinta, Aspirin): anemia and anticoagulation therapy, arthritis: OA., .                 Abdominal:           Vascular: negative vascular ROS. Anesthesia Plan      MAC     ASA 4       Induction: intravenous. Anesthetic plan and risks discussed with patient. Plan discussed with CRNA.                   Kay Ramos MD   1/13/2021

## 2021-01-13 NOTE — PROGRESS NOTES
Patient given discharge instructions. Patient verbalized understanding. No other questions at this time.

## 2021-01-13 NOTE — ANESTHESIA POSTPROCEDURE EVALUATION
Department of Anesthesiology  Postprocedure Note    Patient: Vesna Escobar  MRN: [id number]  YOB: 1945  Date of evaluation: 1/13/2021  Time:  10:33 AM     Procedure Summary     Date: 01/13/21 Room / Location: [facility] OR  / [facility]    Anesthesia Start: 8750 Anesthesia Stop: 0933    Procedure: PORT INSERTION (N/A Chest) Diagnosis: (ANEMIA)    Surgeons: Juarez Sierra MD Responsible Provider: Ana Ugarte MD    Anesthesia Type: MAC ASA Status: 4          Anesthesia Type: MAC    Emilia Phase I: Emilia Score: 10    Emilia Phase II: Emilia Score: 10    Last vitals: Reviewed and per EMR flowsheets.        Anesthesia Post Evaluation    Patient location during evaluation: PACU  Patient participation: complete - patient participated  Level of consciousness: awake and alert  Airway patency: patent  Nausea & Vomiting: no nausea and no vomiting  Complications: no  Cardiovascular status: hemodynamically stable  Respiratory status: acceptable  Hydration status: euvolemic

## 2021-01-13 NOTE — H&P
Chief Complaint:        Chief Complaint   Patient presents with    Consultation     new pt. insertion of port     HPI: Patient came to the office by herself, for preop discussion prior to placement of venous port, for venous access, patient has poor venous access with extreme difficulty for intravenous iron infusions, blood transfusions etc., has chronic blood loss anemia due to bleeding from AV malformations, aggravated by underlying antiplatelet therapy, aspirin and Brilinta for her coronary artery stents, referred by her hematologist Dr. Reagan Hale   Patient otherwise is doing well, denies any chest pain or shortness of breath   Patient denies any focal lateralizing neurological symptoms like loss of speech, vision or loss of function of extremity   Patient can walk a few blocks , and denies any symptoms of rest pain         Allergies   Allergen Reactions    Aspirin Hives    Nsaids Rash    Statins Other (See Comments)     Muscle weakness     Current Facility-Administered Medications          Current Outpatient Medications   Medication Sig Dispense Refill    glimepiride (AMARYL) 4 MG tablet Take 4 mg by mouth every morning (before breakfast)      nitroGLYCERIN (NITROSTAT) 0.4 MG SL tablet up to max of 3 total doses. If no relief after 1 dose, call 911. 25 tablet 3    pantoprazole (PROTONIX) 40 MG tablet Take 1 tablet by mouth every morning (before breakfast) 30 tablet 3    isosorbide dinitrate (ISORDIL) 20 MG tablet Take 1 tablet by mouth 2 times daily (Patient taking differently: Take 10 mg by mouth 2 times daily ) 90 tablet 3    hydrALAZINE (APRESOLINE) 25 MG tablet Take 1 tablet by mouth 2 times daily 90 tablet 3    aspirin 81 MG EC tablet Take 1 tablet by mouth daily 30 tablet 3    metFORMIN (GLUCOPHAGE) 500 MG tablet Take 3 tablets by mouth 2 times daily (with meals) Resume this on 11/13/20 with supper dose.  60 tablet 3    ezetimibe (ZETIA) 10 MG tablet Take 1 tablet by mouth nightly 30 tablet 3  ticagrelor (BRILINTA) 90 MG TABS tablet Take 1 tablet by mouth 2 times daily 60 tablet 5    empagliflozin (JARDIANCE) 25 MG tablet Take 25 mg by mouth daily      raloxifene (EVISTA) 60 MG tablet Take 60 mg by mouth nightly      magnesium oxide (MAG-OX) 400 MG tablet Take 400 mg by mouth daily.  montelukast (SINGULAIR) 10 MG tablet Take 10 mg by mouth nightly.  fenofibrate 160 MG tablet Take 160 mg by mouth nightly.  Cholecalciferol (VITAMIN D-3 PO) Take 2,000 Units by mouth daily       fluticasone-salmeterol (ADVAIR) 250-50 MCG/DOSE AEPB Inhale 1 puff into the lungs as needed. Is weaning off as of 2/5/2014     No current facility-administered medications for this visit.        Past Medical History        Past Medical History:   Diagnosis Date    Anemia     4/2014 put on iron supplement    Arthritis     Asthma     Bronchial asthma, getting better, going to Dr. Carolina Mendoza, weaning off allergy shots and inhalers    Chronic systolic (congestive) heart failure ([facility] [address] 12/03/2020    Colon polyps 02/20/2014    Diabetes ([facility] [address]     Environmental allergies     spring/winter    Full dentures     Gastric ulcer 02/20/2014    GERD (gastroesophageal reflux disease)     High cholesterol     History of blood transfusion     Hypertension     124/70, has been good    Osteopenia     Poor venous access 1/12/2021     Past Surgical History         Past Surgical History:   Procedure Laterality Date    APPENDECTOMY      CARDIAC CATHETERIZATION  11/11/2020    Dr Bianca Brown, OPEN  1980's early    COLONOSCOPY N/A 12/05/2020    COLONOSCOPY POLYPECTOMY SNARE/COLD BIOPSY performed by Estela Strong MD at [place] [address]  11/11/2020    DR Yosvany Alston Resolute [id number] 3.0x 22prox    ENDOSCOPY, COLON, DIAGNOSTIC      KNEE ARTHROCENTESIS      KNEE ARTHROSCOPY Left 1980's    OTHER SURGICAL HISTORY  10/13/2014 antrogade balloon enteroscopy with biopsy of polyp and scleratherapy    TUBAL LIGATION      UPPER GASTROINTESTINAL ENDOSCOPY  04/18/2014    UPPER GASTROINTESTINAL ENDOSCOPY N/A 12/04/2020    EGD ESOPHAGOGASTRODUODENOSCOPY performed by Celena Ruano MD at [facility] History         Family History   Problem Relation Age of Onset    Stroke Mother     Cancer Father     colon    Heart Disease Father      Social History         Socioeconomic History    Marital status:      Spouse name: Not on file    Number of children: Not on file    Years of education: Not on file    Highest education level: Not on file   Occupational History    Not on file   Social Needs    Financial resource strain: Not on file    Food insecurity     Worry: Not on file     Inability: Not on file    Transportation needs     Medical: Not on file     Non-medical: Not on file   Tobacco Use    Smoking status: Never Smoker    Smokeless tobacco: Never Used   Substance and Sexual Activity    Alcohol use:  Yes     Alcohol/week: 0.0 standard drinks     Comment: socially    Drug use: No    Sexual activity: Not Currently     Partners: Male   Lifestyle    Physical activity     Days per week: Not on file     Minutes per session: Not on file    Stress: Not on file   Relationships    Social connections     Talks on phone: Not on file     Gets together: Not on file     Attends Scientologist service: Not on file     Active member of club or organization: Not on file     Attends meetings of clubs or organizations: Not on file     Relationship status: Not on file    Intimate partner violence     Fear of current or ex partner: Not on file     Emotionally abused: Not on file     Physically abused: Not on file     Forced sexual activity: Not on file   Other Topics Concern    Not on file   Social History Narrative    Not on file   Review of Systems:   Skin: No abnormal pigmentation or rash Eyes: No blurring, diplopia or vision loss   Ears/Nose/Throat: No hearing loss or vertigo   Respiratory: No cough, pleuritic chest pain, dyspnea, or wheezing. Cardiovascular: No angina, palpitations . Coronary artery disease, history of angioplasty and stent placement, hypertension, hyperlipidemia   Gastrointestinal: No nausea or vomiting; no abdominal pain or rectal bleeding. GERD   Musculoskeletal: No arthritis or weakness. Neurologic: No paralysis, paresis, paresthesia, seizures or headaches   Hematologic/Lymphatic/Immunologic: No anemia, abnormal bleeding/bruising, fever, chills or night sweats. Chronic anemia due to chronic blood loss and iron deficiency anemia due to AV malformations bleeding and a frequent basis   Endocrine: No heat or cold intolerance. No polyphagia, polydipsia or polyuria. Diabetes mellitus   Physical Exam:   General appearance: Alert, awake, oriented x 3. No distress. Skin: Warm and dry   Head: Normocephalic. No masses, lesions or tenderness   Eyes: Conjunctivae appear normal; PERRL   Ears: External ears normal   Nose/Sinuses: Septum midline, mucosa normal; no drainage   Oropharynx: Clear, no exudate noted   Neck: No jugular venous distention, lymphadenopathy or thyromegaly. No evidence of carotid bruit   Lungs: Clear to ausculation bilaterally. No rhonchi, crackles, wheezes   Heart: Regular rate and rhythm. No rub or murmur   Abdomen: Soft, non-tender. No masses, organomegaly. Musculoskeletal : No joint effusions, tenderness swelling   Neuro: Speech is intact. Moving all extremities. No focal motor or sensory deficits   Extremities: Both feet are warm to touch. The color of both feet is normal.   Pulses Right  Left    Brachial 3 3    Radial   3=normal   Femoral 2 2 2=diminished   Popliteal   1=barely palpable   Dorsalis pedis   0=absent   Posterior tibial   4=aneurysmal   Other pertinent information:1. The past medical records were reviewed. 2. The medical records of her oncologist office were reviewed   Assessment:   1. Iron deficiency anemia due to chronic blood loss    2. Poor venous access    3. AV malformations of the GI tract   4. Coronary artery disease, post coronary artery angioplasty stent placement, currently on aspirin and Brilinta   Plan:   Detailed discussion with patient, options, risks benefits and alternatives explained, patient was recommended to proceed with insertion of venous port as instructed by her hematologist Dr. Mahamed Michaels   The risks, benefits, options and alternatives were clearly explained including bleeding, clotting, infection, arterial, venous, nerve, cardiopulmonary complications, up to 2% plus chance of major complications which they understand and consent for surgery.  All questions were answered       Addendum:    No changes noted in the history and physical examination the patient since my last last evaluation of the patient    Simon Rowley

## 2021-01-20 ENCOUNTER — TELEPHONE (OUTPATIENT)
Dept: VASCULAR SURGERY | Age: 76
End: 2021-01-20

## 2021-01-20 ENCOUNTER — APPOINTMENT (OUTPATIENT)
Dept: GENERAL RADIOLOGY | Age: 76
DRG: 812 | End: 2021-01-20
Payer: MEDICARE

## 2021-01-20 ENCOUNTER — CARE COORDINATION (OUTPATIENT)
Dept: CASE MANAGEMENT | Age: 76
End: 2021-01-20

## 2021-01-20 ENCOUNTER — HOSPITAL ENCOUNTER (INPATIENT)
Age: 76
LOS: 1 days | Discharge: HOME | DRG: 812 | End: 2021-01-21
Attending: EMERGENCY MEDICINE | Admitting: FAMILY MEDICINE
Payer: MEDICARE

## 2021-01-20 DIAGNOSIS — K92.2 GASTROINTESTINAL HEMORRHAGE, UNSPECIFIED GASTROINTESTINAL HEMORRHAGE TYPE: Primary | ICD-10-CM

## 2021-01-20 DIAGNOSIS — D64.9 ANEMIA, UNSPECIFIED TYPE: ICD-10-CM

## 2021-01-20 LAB
ABO/RH: NORMAL
ALBUMIN SERPL-MCNC: 3.1 G/DL (ref 3.5–5.2)
ALP BLD-CCNC: 40 U/L (ref 35–104)
ALT SERPL-CCNC: 9 U/L (ref 0–32)
ANION GAP SERPL CALCULATED.3IONS-SCNC: 12 MMOL/L (ref 7–16)
ANISOCYTOSIS: ABNORMAL
ANTIBODY SCREEN: NORMAL
APTT: 29.9 SEC (ref 24.5–35.1)
AST SERPL-CCNC: 27 U/L (ref 0–31)
BASOPHILS ABSOLUTE: 0.01 E9/L (ref 0–0.2)
BASOPHILS RELATIVE PERCENT: 0.2 % (ref 0–2)
BILIRUB SERPL-MCNC: 0.3 MG/DL (ref 0–1.2)
BLOOD BANK DISPENSE STATUS: NORMAL
BLOOD BANK DISPENSE STATUS: NORMAL
BLOOD BANK PRODUCT CODE: NORMAL
BLOOD BANK PRODUCT CODE: NORMAL
BPU ID: NORMAL
BPU ID: NORMAL
BUN BLDV-MCNC: 47 MG/DL (ref 8–23)
CALCIUM SERPL-MCNC: 8.9 MG/DL (ref 8.6–10.2)
CHLORIDE BLD-SCNC: 103 MMOL/L (ref 98–107)
CO2: 21 MMOL/L (ref 22–29)
CREAT SERPL-MCNC: 2.1 MG/DL (ref 0.5–1)
DESCRIPTION BLOOD BANK: NORMAL
DESCRIPTION BLOOD BANK: NORMAL
EOSINOPHILS ABSOLUTE: 0.02 E9/L (ref 0.05–0.5)
EOSINOPHILS RELATIVE PERCENT: 0.4 % (ref 0–6)
GFR AFRICAN AMERICAN: 28
GFR NON-AFRICAN AMERICAN: 23 ML/MIN/1.73
GLUCOSE BLD-MCNC: 110 MG/DL (ref 74–99)
HCT VFR BLD CALC: 19.3 % (ref 34–48)
HEMOGLOBIN: 5.7 G/DL (ref 11.5–15.5)
IMMATURE GRANULOCYTES #: 0.07 E9/L
IMMATURE GRANULOCYTES %: 1.5 % (ref 0–5)
INR BLD: 1.2
LYMPHOCYTES ABSOLUTE: 0.43 E9/L (ref 1.5–4)
LYMPHOCYTES RELATIVE PERCENT: 9.2 % (ref 20–42)
MCH RBC QN AUTO: 29.2 PG (ref 26–35)
MCHC RBC AUTO-ENTMCNC: 29.5 % (ref 32–34.5)
MCV RBC AUTO: 99 FL (ref 80–99.9)
MONOCYTES ABSOLUTE: 0.37 E9/L (ref 0.1–0.95)
MONOCYTES RELATIVE PERCENT: 7.9 % (ref 2–12)
NEUTROPHILS ABSOLUTE: 3.77 E9/L (ref 1.8–7.3)
NEUTROPHILS RELATIVE PERCENT: 80.8 % (ref 43–80)
OVALOCYTES: ABNORMAL
PDW BLD-RTO: 16.7 FL (ref 11.5–15)
PLATELET # BLD: 232 E9/L (ref 130–450)
PMV BLD AUTO: 8.9 FL (ref 7–12)
POIKILOCYTES: ABNORMAL
POLYCHROMASIA: ABNORMAL
POTASSIUM REFLEX MAGNESIUM: 3.6 MMOL/L (ref 3.5–5)
PROTHROMBIN TIME: 14 SEC (ref 9.3–12.4)
RBC # BLD: 1.95 E12/L (ref 3.5–5.5)
SODIUM BLD-SCNC: 136 MMOL/L (ref 132–146)
TEAR DROP CELLS: ABNORMAL
TOTAL PROTEIN: 5.3 G/DL (ref 6.4–8.3)
WBC # BLD: 4.7 E9/L (ref 4.5–11.5)

## 2021-01-20 PROCEDURE — 86923 COMPATIBILITY TEST ELECTRIC: CPT

## 2021-01-20 PROCEDURE — 85730 THROMBOPLASTIN TIME PARTIAL: CPT

## 2021-01-20 PROCEDURE — 86901 BLOOD TYPING SEROLOGIC RH(D): CPT

## 2021-01-20 PROCEDURE — 80053 COMPREHEN METABOLIC PANEL: CPT

## 2021-01-20 PROCEDURE — 86850 RBC ANTIBODY SCREEN: CPT

## 2021-01-20 PROCEDURE — 99284 EMERGENCY DEPT VISIT MOD MDM: CPT

## 2021-01-20 PROCEDURE — P9016 RBC LEUKOCYTES REDUCED: HCPCS

## 2021-01-20 PROCEDURE — 6370000000 HC RX 637 (ALT 250 FOR IP): Performed by: FAMILY MEDICINE

## 2021-01-20 PROCEDURE — 85025 COMPLETE CBC W/AUTO DIFF WBC: CPT

## 2021-01-20 PROCEDURE — 36430 TRANSFUSION BLD/BLD COMPNT: CPT

## 2021-01-20 PROCEDURE — 71045 X-RAY EXAM CHEST 1 VIEW: CPT

## 2021-01-20 PROCEDURE — 02HV33Z INSERTION OF INFUSION DEVICE INTO SUPERIOR VENA CAVA, PERCUTANEOUS APPROACH: ICD-10-PCS | Performed by: RADIOLOGY

## 2021-01-20 PROCEDURE — 1200000000 HC SEMI PRIVATE

## 2021-01-20 PROCEDURE — 85610 PROTHROMBIN TIME: CPT

## 2021-01-20 PROCEDURE — 86900 BLOOD TYPING SEROLOGIC ABO: CPT

## 2021-01-20 RX ORDER — SODIUM CHLORIDE 0.9 % (FLUSH) 0.9 %
10 SYRINGE (ML) INJECTION EVERY 12 HOURS SCHEDULED
Status: DISCONTINUED | OUTPATIENT
Start: 2021-01-20 | End: 2021-01-21 | Stop reason: HOSPADM

## 2021-01-20 RX ORDER — GLIMEPIRIDE 4 MG/1
4 TABLET ORAL
Status: DISCONTINUED | OUTPATIENT
Start: 2021-01-21 | End: 2021-01-21 | Stop reason: HOSPADM

## 2021-01-20 RX ORDER — HYDRALAZINE HYDROCHLORIDE 25 MG/1
25 TABLET, FILM COATED ORAL 2 TIMES DAILY
Status: DISCONTINUED | OUTPATIENT
Start: 2021-01-20 | End: 2021-01-21 | Stop reason: HOSPADM

## 2021-01-20 RX ORDER — VITAMIN B COMPLEX
2000 TABLET ORAL DAILY
Status: DISCONTINUED | OUTPATIENT
Start: 2021-01-20 | End: 2021-01-21 | Stop reason: HOSPADM

## 2021-01-20 RX ORDER — ONDANSETRON 2 MG/ML
4 INJECTION INTRAMUSCULAR; INTRAVENOUS EVERY 6 HOURS PRN
Status: DISCONTINUED | OUTPATIENT
Start: 2021-01-20 | End: 2021-01-21 | Stop reason: HOSPADM

## 2021-01-20 RX ORDER — PANTOPRAZOLE SODIUM 40 MG/1
40 TABLET, DELAYED RELEASE ORAL
Status: DISCONTINUED | OUTPATIENT
Start: 2021-01-21 | End: 2021-01-21 | Stop reason: HOSPADM

## 2021-01-20 RX ORDER — SODIUM CHLORIDE 9 MG/ML
INJECTION, SOLUTION INTRAVENOUS PRN
Status: DISCONTINUED | OUTPATIENT
Start: 2021-01-20 | End: 2021-01-21 | Stop reason: HOSPADM

## 2021-01-20 RX ORDER — ACETAMINOPHEN 325 MG/1
650 TABLET ORAL EVERY 6 HOURS PRN
Status: DISCONTINUED | OUTPATIENT
Start: 2021-01-20 | End: 2021-01-21 | Stop reason: HOSPADM

## 2021-01-20 RX ORDER — NITROGLYCERIN 0.4 MG/1
0.4 TABLET SUBLINGUAL EVERY 5 MIN PRN
Status: DISCONTINUED | OUTPATIENT
Start: 2021-01-20 | End: 2021-01-21 | Stop reason: HOSPADM

## 2021-01-20 RX ORDER — FENOFIBRATE 160 MG/1
160 TABLET ORAL DAILY
Status: DISCONTINUED | OUTPATIENT
Start: 2021-01-20 | End: 2021-01-21 | Stop reason: HOSPADM

## 2021-01-20 RX ORDER — ISOSORBIDE DINITRATE 10 MG/1
10 TABLET ORAL 2 TIMES DAILY
Status: DISCONTINUED | OUTPATIENT
Start: 2021-01-20 | End: 2021-01-21 | Stop reason: HOSPADM

## 2021-01-20 RX ORDER — SODIUM CHLORIDE 0.9 % (FLUSH) 0.9 %
10 SYRINGE (ML) INJECTION PRN
Status: DISCONTINUED | OUTPATIENT
Start: 2021-01-20 | End: 2021-01-21 | Stop reason: HOSPADM

## 2021-01-20 RX ORDER — BUDESONIDE AND FORMOTEROL FUMARATE DIHYDRATE 160; 4.5 UG/1; UG/1
2 AEROSOL RESPIRATORY (INHALATION) 2 TIMES DAILY
Status: DISCONTINUED | OUTPATIENT
Start: 2021-01-20 | End: 2021-01-21 | Stop reason: HOSPADM

## 2021-01-20 RX ORDER — PROMETHAZINE HYDROCHLORIDE 25 MG/1
12.5 TABLET ORAL EVERY 6 HOURS PRN
Status: DISCONTINUED | OUTPATIENT
Start: 2021-01-20 | End: 2021-01-21 | Stop reason: HOSPADM

## 2021-01-20 RX ORDER — EZETIMIBE 10 MG/1
10 TABLET ORAL NIGHTLY
Status: DISCONTINUED | OUTPATIENT
Start: 2021-01-20 | End: 2021-01-21 | Stop reason: HOSPADM

## 2021-01-20 RX ORDER — RALOXIFENE HYDROCHLORIDE 60 MG/1
60 TABLET, FILM COATED ORAL NIGHTLY
Status: DISCONTINUED | OUTPATIENT
Start: 2021-01-20 | End: 2021-01-21 | Stop reason: HOSPADM

## 2021-01-20 RX ORDER — ACETAMINOPHEN 650 MG/1
650 SUPPOSITORY RECTAL EVERY 6 HOURS PRN
Status: DISCONTINUED | OUTPATIENT
Start: 2021-01-20 | End: 2021-01-21 | Stop reason: HOSPADM

## 2021-01-20 RX ORDER — MONTELUKAST SODIUM 10 MG/1
10 TABLET ORAL NIGHTLY
Status: DISCONTINUED | OUTPATIENT
Start: 2021-01-20 | End: 2021-01-21 | Stop reason: HOSPADM

## 2021-01-20 RX ADMIN — RALOXIFENE HYDROCHLORIDE 60 MG: 60 TABLET, FILM COATED ORAL at 21:45

## 2021-01-20 RX ADMIN — HYDRALAZINE HYDROCHLORIDE 25 MG: 25 TABLET, FILM COATED ORAL at 21:45

## 2021-01-20 RX ADMIN — Medication 2000 UNITS: at 21:45

## 2021-01-20 RX ADMIN — FENOFIBRATE 160 MG: 160 TABLET ORAL at 21:45

## 2021-01-20 RX ADMIN — ISOSORBIDE DINITRATE 10 MG: 10 TABLET ORAL at 21:45

## 2021-01-20 RX ADMIN — MONTELUKAST SODIUM 10 MG: 10 TABLET, FILM COATED ORAL at 21:44

## 2021-01-20 RX ADMIN — TICAGRELOR 90 MG: 90 TABLET ORAL at 21:45

## 2021-01-20 RX ADMIN — EZETIMIBE 10 MG: 10 TABLET ORAL at 21:44

## 2021-01-20 RX ADMIN — Medication 400 MG: at 21:44

## 2021-01-20 RX ADMIN — METFORMIN HYDROCHLORIDE 850 MG: 850 TABLET ORAL at 21:45

## 2021-01-20 ASSESSMENT — ENCOUNTER SYMPTOMS
WHEEZING: 0
ABDOMINAL DISTENTION: 0
SINUS PRESSURE: 0
SORE THROAT: 0
BLOOD IN STOOL: 1
SHORTNESS OF BREATH: 0
BACK PAIN: 0
VOMITING: 0
EYE DISCHARGE: 0
DIARRHEA: 0
NAUSEA: 0
EYE REDNESS: 0
EYE PAIN: 0
COUGH: 0

## 2021-01-20 ASSESSMENT — PAIN SCALES - GENERAL: PAINLEVEL_OUTOF10: 0

## 2021-01-20 NOTE — CARE COORDINATION
[facility] Transitions Follow Up Call    2021    Patient: Tracy Shepard  Patient : 1945   MRN: <[id number]>  Reason for Admission:   Discharge Date: 21 RARS: Readmission Risk Score: 23         Spoke with: Tracy Shepard, patient    Care Transitions Subsequent and Final Call    Subsequent and Final Calls  Do you have any ongoing symptoms?: No  Have your medications changed?: No  Do you have any questions related to your medications?: No  Do you currently have any active services?: No  Do you have any needs or concerns that I can assist you with?: No  Identified Barriers: None  Care Transitions Interventions  Other Interventions: Follow Up:  Contacted patient for BPCI-A follow up. Pat stated that she is doing alright. She denies having any c/o chest pain/discomfort, pressure, tightness, shortness of breath, numbness, tingling. She stated she does become short of breath when her blood count is low. She reports that a port placed last week. She denies having any c/o pain or signs/symptoms of infection. She stated the site looks and feels fine. She asked how long before port can be accessed. Informed her typically 5-7 days but she should verify with the surgeon. She stated she has a follow up with the surgeon tomorrow and will ask then. She is aware of when to contact provider with any new or worsening symptoms. No questions or concerns at this time. Will continue to follow.       Future Appointments   Date Time Provider Gayathri Dempsey   2021 11:15 AM Hedy Walker MD [facility]       Sade Wan RN

## 2021-01-20 NOTE — ED NOTES
Bed:  HALL-08  Expected date:   Expected time:   Means of arrival:   Comments:  811 Tonie Blas RN  01/20/21 6847 DC instructions

## 2021-01-20 NOTE — ED PROVIDER NOTES
HPI   73-year-old female patient presented to emergency department with low hemoglobin. Patient states that she had MI recently and has been on Brilinta. She has been having rectal bleeding as a result for the last few months and has been averaging 1 blood transfusion a week since. Patient currently denying any chest pain, nausea, vomiting, diarrhea. Says she is having rectal bleeding the usual amount. Patient sees hematologist at the [facility] and had hemoglobin of 6.1 today. Last week she also had a port placed in her right chest.  At this time she is not complaining of any pain. Review of Systems   Constitutional: Positive for fatigue. Negative for chills and fever. HENT: Negative for ear pain, sinus pressure and sore throat. Eyes: Negative for pain, discharge and redness. Respiratory: Negative for cough, shortness of breath and wheezing. Cardiovascular: Negative for chest pain. Gastrointestinal: Positive for blood in stool. Negative for abdominal distention, diarrhea, nausea and vomiting. Genitourinary: Negative for dysuria and frequency. Musculoskeletal: Negative for arthralgias and back pain. Skin: Negative for rash and wound. Neurological: Negative for weakness and headaches. Hematological: Negative for adenopathy. All other systems reviewed and are negative. Physical Exam  Vitals signs and nursing note reviewed. Constitutional:       Appearance: Normal appearance. HENT:      Head: Normocephalic and atraumatic. Nose: Nose normal. No congestion. Mouth/Throat:      Mouth: Mucous membranes are moist.      Pharynx: Oropharynx is clear. Eyes:      Conjunctiva/sclera: Conjunctivae normal.      Pupils: Pupils are equal, round, and reactive to light. Neck:      Musculoskeletal: Normal range of motion and neck supple. Cardiovascular:      Rate and Rhythm: Normal rate and regular rhythm. Pulses: Normal pulses. Heart sounds: Normal heart sounds. Pulmonary:      Effort: Pulmonary effort is normal. No respiratory distress. Breath sounds: Normal breath sounds. Abdominal:      General: Bowel sounds are normal. There is no distension. Tenderness: There is no abdominal tenderness. Musculoskeletal: Normal range of motion. Skin:     General: Skin is warm and dry. Capillary Refill: Capillary refill takes less than 2 seconds. Neurological:      General: No focal deficit present. Mental Status: She is alert. Procedures     MDM   18-year-old female patient presented to emergency department with low hemoglobin. Patient has history of AVM on Brilinta and follows with [facility] hematology. Today hemoglobin was 6.1. Here in the emergency department hemoglobin was 5.7. Last hemoglobin was 10 approximately a week ago. This four-point drop is pretty significant over the last week. Patient received 2 units of blood and we admitted the patient for further work-up of AVMs. Patient does have right IJ port in place this was confirmed with x-ray imaging. At this time patient's vital signs are stable and she will be admitted to medicine.          --------------------------------------------- PAST HISTORY ---------------------------------------------  Past Medical History:  has a past medical history of Anemia, Arthritis, Asthma, Chronic systolic (congestive) heart failure ([facility] [address] Colon polyps, Diabetes ([facility] [address] Environmental allergies, Full dentures, Gastric ulcer, GERD (gastroesophageal reflux disease), High cholesterol, History of blood transfusion, Hypertension, Osteopenia, and Poor venous access. Past Surgical History:  has a past surgical history that includes Knee Arthrocentesis; Knee arthroscopy (Left, 1980's); Cholecystectomy, open (1980's early); Tubal ligation; Endoscopy, colon, diagnostic; Upper gastrointestinal endoscopy (04/18/2014); Appendectomy; other surgical history (10/13/2014); Cardiac catheterization (11/11/2020); Coronary angioplasty with stent (11/11/2020); Upper gastrointestinal endoscopy (N/A, 12/04/2020); Colonoscopy (N/A, 12/05/2020); and Port Surgery (N/A, 1/13/2021). Social History:  reports that she has never smoked. She has never used smokeless tobacco. She reports current alcohol use. She reports that she does not use drugs. Family History: family history includes Cancer in her father; Heart Disease in her father; Stroke in her mother. The patients home medications have been reviewed.     Allergies: Aspirin, Nsaids, and Statins    -------------------------------------------------- RESULTS -------------------------------------------------    LABS:  Results for orders placed or performed during the hospital encounter of 01/20/21   CBC Auto Differential   Result Value Ref Range    WBC 4.7 4.5 - 11.5 E9/L    RBC 1.95 (L) 3.50 - 5.50 E12/L    Hemoglobin 5.7 (LL) 11.5 - 15.5 g/dL    Hematocrit 19.3 (L) 34.0 - 48.0 %    MCV 99.0 80.0 - 99.9 fL    MCH 29.2 26.0 - 35.0 pg    MCHC 29.5 (L) 32.0 - 34.5 %    RDW 16.7 (H) 11.5 - 15.0 fL    Platelets 999 882 - 844 E9/L    MPV 8.9 7.0 - 12.0 fL    Neutrophils % 80.8 (H) 43.0 - 80.0 %    Immature Granulocytes % 1.5 0.0 - 5.0 %    Lymphocytes % 9.2 (L) 20.0 - 42.0 %    Monocytes % 7.9 2.0 - 12.0 %    Eosinophils % 0.4 0.0 - 6.0 %    Basophils % 0.2 0.0 - 2.0 %    Neutrophils Absolute 3.77 1.80 - 7.30 E9/L    Immature Granulocytes # 0.07 E9/L    Lymphocytes Absolute 0.43 (L) 1.50 - 4.00 E9/L    Monocytes Absolute 0.37 0.10 - 0.95 E9/L    Eosinophils Absolute 0.02 (L) 0.05 - 0.50 E9/L    Basophils Absolute 0.01 0.00 - 0.20 E9/L Anisocytosis 1+     Polychromasia 2+     Poikilocytes 1+     Ovalocytes 1+     Tear Drop Cells 1+    Comprehensive Metabolic Panel w/ Reflex to MG   Result Value Ref Range    Sodium 136 132 - 146 mmol/L    Potassium reflex Magnesium 3.6 3.5 - 5.0 mmol/L    Chloride 103 98 - 107 mmol/L    CO2 21 (L) 22 - 29 mmol/L    Anion Gap 12 7 - 16 mmol/L    Glucose 110 (H) 74 - 99 mg/dL    BUN 47 (H) 8 - 23 mg/dL    CREATININE 2.1 (H) 0.5 - 1.0 mg/dL    GFR Non-African American 23 >=60 mL/min/1.73    GFR African American 28     Calcium 8.9 8.6 - 10.2 mg/dL    Total Protein 5.3 (L) 6.4 - 8.3 g/dL    Alb 3.1 (L) 3.5 - 5.2 g/dL    Total Bilirubin 0.3 0.0 - 1.2 mg/dL    Alkaline Phosphatase 40 35 - 104 U/L    ALT 9 0 - 32 U/L    AST 27 0 - 31 U/L   Protime-INR   Result Value Ref Range    Protime 14.0 (H) 9.3 - 12.4 sec    INR 1.2    APTT   Result Value Ref Range    aPTT 29.9 24.5 - 35.1 sec   TYPE AND SCREEN   Result Value Ref Range    ABO/Rh O POS     Antibody Screen NEG    PREPARE RBC (CROSSMATCH), 2 Units   Result Value Ref Range    Product Code Blood Bank [id number]     Description Blood Bank Red Blood Cells, Leuko-reduced     Unit Number [id number]     Dispense Status Blood Bank issued     Product Code Blood Bank [id number]     Description Blood Bank Red Blood Cells, Leuko-reduced     Unit Number [id number]     Dispense Status Blood Bank selected        RADIOLOGY:  XR CHEST PORTABLE   Final Result   Right IJ MediPort in appropriate position without complications. Atelectasis in the left retrocardiac region. ------------------------- NURSING NOTES AND VITALS REVIEWED ---------------------------  Date / Time Roomed:  1/20/2021  4:29 PM  ED Bed Assignment:  [id number]    The nursing notes within the ED encounter and vital signs as below have been reviewed.      Patient Vitals for the past 24 hrs:   BP Temp Temp src Pulse Resp SpO2 Height Weight   01/20/21 2000 (!) 140/81 98.4 °F (36.9 °C) Oral 83 16 100 %   01/20/21 1855 (!) 162/71 98.7 °F (37.1 °C)  85 16 99 %     01/20/21 1839 133/63 98.7 °F (37.1 °C) Oral 66 16 96 %     01/20/21 1307 (!) 142/64 97 °F (36.1 °C) Tympanic 98 14 100 % 5' 3\" (1.6 m) 115 lb (52.2 kg)   01/20/21 1236  97.2 °F (36.2 °C)             Oxygen Saturation Interpretation: Normal    ------------------------------------------ PROGRESS NOTES ------------------------------------------  Re-evaluation(s):  Time: 770  Patients symptoms show no change  Repeat physical examination is not changed    Counseling:  I have spoken with the patient and discussed todays results, in addition to providing specific details for the plan of care and counseling regarding the diagnosis and prognosis. Their questions are answered at this time and they are agreeable with the plan of admission.    --------------------------------- ADDITIONAL PROVIDER NOTES ---------------------------------  Consultations:  Time: 700. Spoke with Dr. Kelly Hallman for Dr. Daniela Mcneal. Discussed case. They will admit the patient. This patient's ED course included: a personal history and physicial examination, re-evaluation prior to disposition, multiple bedside re-evaluations, IV medications and cardiac monitoring    This patient has remained hemodynamically stable during their ED course. Diagnosis:  1. Gastrointestinal hemorrhage, unspecified gastrointestinal hemorrhage type    2. Anemia, unspecified type        Disposition:  Patient's disposition: Admit to telemetry  Patient's condition is stable.                    Sylvia Beckham DO  Resident  01/20/21 5024

## 2021-01-21 ENCOUNTER — OFFICE VISIT (OUTPATIENT)
Dept: VASCULAR SURGERY | Age: 76
End: 2021-01-21

## 2021-01-21 VITALS
BODY MASS INDEX: 20.38 KG/M2 | DIASTOLIC BLOOD PRESSURE: 70 MMHG | WEIGHT: 115 LBS | RESPIRATION RATE: 16 BRPM | TEMPERATURE: 97.9 F | SYSTOLIC BLOOD PRESSURE: 159 MMHG | OXYGEN SATURATION: 99 % | HEART RATE: 80 BPM | HEIGHT: 63 IN

## 2021-01-21 DIAGNOSIS — Z98.890 POST-OPERATIVE STATE: ICD-10-CM

## 2021-01-21 LAB
ANION GAP SERPL CALCULATED.3IONS-SCNC: 10 MMOL/L (ref 7–16)
BUN BLDV-MCNC: 44 MG/DL (ref 8–23)
CALCIUM SERPL-MCNC: 8.4 MG/DL (ref 8.6–10.2)
CHLORIDE BLD-SCNC: 106 MMOL/L (ref 98–107)
CO2: 19 MMOL/L (ref 22–29)
CREAT SERPL-MCNC: 2.1 MG/DL (ref 0.5–1)
GFR AFRICAN AMERICAN: 28
GFR NON-AFRICAN AMERICAN: 23 ML/MIN/1.73
GLUCOSE BLD-MCNC: 97 MG/DL (ref 74–99)
HCT VFR BLD CALC: 26.3 % (ref 34–48)
HCT VFR BLD CALC: 27.3 % (ref 34–48)
HEMOGLOBIN: 8.6 G/DL (ref 11.5–15.5)
HEMOGLOBIN: 8.7 G/DL (ref 11.5–15.5)
POTASSIUM REFLEX MAGNESIUM: 3.7 MMOL/L (ref 3.5–5)
SODIUM BLD-SCNC: 135 MMOL/L (ref 132–146)

## 2021-01-21 PROCEDURE — 2580000003 HC RX 258: Performed by: FAMILY MEDICINE

## 2021-01-21 PROCEDURE — 85018 HEMOGLOBIN: CPT

## 2021-01-21 PROCEDURE — 36415 COLL VENOUS BLD VENIPUNCTURE: CPT

## 2021-01-21 PROCEDURE — 6370000000 HC RX 637 (ALT 250 FOR IP): Performed by: FAMILY MEDICINE

## 2021-01-21 PROCEDURE — 80048 BASIC METABOLIC PNL TOTAL CA: CPT

## 2021-01-21 PROCEDURE — 85014 HEMATOCRIT: CPT

## 2021-01-21 PROCEDURE — 99024 POSTOP FOLLOW-UP VISIT: CPT | Performed by: SURGERY

## 2021-01-21 RX ORDER — NICOTINE POLACRILEX 4 MG
15 LOZENGE BUCCAL PRN
Status: DISCONTINUED | OUTPATIENT
Start: 2021-01-21 | End: 2021-01-21 | Stop reason: HOSPADM

## 2021-01-21 RX ORDER — DEXTROSE MONOHYDRATE 25 G/50ML
12.5 INJECTION, SOLUTION INTRAVENOUS PRN
Status: DISCONTINUED | OUTPATIENT
Start: 2021-01-21 | End: 2021-01-21 | Stop reason: HOSPADM

## 2021-01-21 RX ORDER — DEXTROSE MONOHYDRATE 50 MG/ML
100 INJECTION, SOLUTION INTRAVENOUS PRN
Status: DISCONTINUED | OUTPATIENT
Start: 2021-01-21 | End: 2021-01-21 | Stop reason: HOSPADM

## 2021-01-21 RX ADMIN — TICAGRELOR 90 MG: 90 TABLET ORAL at 09:13

## 2021-01-21 RX ADMIN — SODIUM CHLORIDE, PRESERVATIVE FREE 10 ML: 5 INJECTION INTRAVENOUS at 09:17

## 2021-01-21 RX ADMIN — Medication 2000 UNITS: at 09:17

## 2021-01-21 RX ADMIN — METFORMIN HYDROCHLORIDE 850 MG: 850 TABLET ORAL at 09:13

## 2021-01-21 RX ADMIN — ISOSORBIDE DINITRATE 10 MG: 10 TABLET ORAL at 09:13

## 2021-01-21 RX ADMIN — HYDRALAZINE HYDROCHLORIDE 25 MG: 25 TABLET, FILM COATED ORAL at 09:15

## 2021-01-21 RX ADMIN — PANTOPRAZOLE SODIUM 40 MG: 40 TABLET, DELAYED RELEASE ORAL at 06:26

## 2021-01-21 RX ADMIN — GLIMEPIRIDE 4 MG: 4 TABLET ORAL at 06:26

## 2021-01-21 RX ADMIN — Medication 400 MG: at 09:17

## 2021-01-21 NOTE — ED NOTES
Spoke to Feliberto Aggarwal, ext [phone], confirmed sbar. Pt prepared for transport via cart and monitor at this time.      Jarad Ruano RN  01/20/21 1939

## 2021-01-21 NOTE — PROGRESS NOTES
Subjective: The patient is awake and alert in bed. She wants to go home. Hemoglobin is steady. Normally, she has her hemoglobin checked at the [facility] on Mondays and Thursdays. She states that she has had dark stools since May 2020 after her heart attack. She has been on Brilinta since. No problems overnight. Denies chest pain, angina, dyspnea or abdominal discomfort. No nausea or vomiting. Tolerating diet. Objective:    BP (!) 159/70   Pulse 80   Temp 98.3 °F (36.8 °C) (Oral)   Resp 16   Ht 5' 3\" (1.6 m)   Wt 115 lb (52.2 kg)   SpO2 99%   BMI 20.37 kg/m²     General: NAD, pleasant   HEENT: No thrush or mucositis, EOMI, PERRLA  Heart:  RRR, no murmurs, gallops, or rubs.   Lungs:  CTA bilaterally, no wheeze, rales or rhonchi  Abd: bowel sounds present, nontender, nondistended, no masses  Extrem:  No clubbing, cyanosis, or edema  Lymphatics: No palpable adenopathy in cervical and supraclavicular regions  Skin: Intact, no petechia or purpura    CBC with Differential:    Lab Results   Component Value Date    WBC 4.7 01/20/2021    RBC 1.95 01/20/2021    HGB 8.6 01/21/2021    HCT 26.3 01/21/2021     01/20/2021    MCV 99.0 01/20/2021    MCH 29.2 01/20/2021    MCHC 29.5 01/20/2021    RDW 16.7 01/20/2021    LYMPHOPCT 9.2 01/20/2021    MONOPCT 7.9 01/20/2021    BASOPCT 0.2 01/20/2021    MONOSABS 0.37 01/20/2021    LYMPHSABS 0.43 01/20/2021    EOSABS 0.02 01/20/2021    BASOSABS 0.01 01/20/2021     CMP:    Lab Results   Component Value Date     01/21/2021    K 3.7 01/21/2021     01/21/2021    CO2 19 01/21/2021    BUN 44 01/21/2021    CREATININE 2.1 01/21/2021    GFRAA 28 01/21/2021    LABGLOM 23 01/21/2021    GLUCOSE 97 01/21/2021    PROT 5.3 01/20/2021    LABALBU 3.1 01/20/2021    CALCIUM 8.4 01/21/2021    BILITOT 0.3 01/20/2021    ALKPHOS 40 01/20/2021    AST 27 01/20/2021    ALT 9 01/20/2021          Current Facility-Administered Medications:   glucose (GLUTOSE) 40 % oral gel 15 g, 15 g, Oral, PRN, [place] Laura Vargas DO    dextrose 50 % IV solution, 12.5 g, Intravenous, PRN, Earl Zafar DO    glucagon (rDNA) injection 1 mg, 1 mg, Intramuscular, PRN, Earl Zafar DO    dextrose 5 % solution, 100 mL/hr, Intravenous, PRN, Earl Zafar DO    0.9 % sodium chloride infusion, , Intravenous, PRN, Earl Zafar DO    Vitamin D (CHOLECALCIFEROL) tablet 2,000 Units, 2,000 Units, Oral, Daily, Earl Zafar DO, 2,000 Units at 01/20/21 2145    empagliflozin (JARDIANCE) tablet TABS 25 mg, 25 mg, Oral, Daily, Earl Zafar DO    ezetimibe (ZETIA) tablet 10 mg, 10 mg, Oral, Nightly, [facility] DO Sam, 10 mg at 01/20/21 2144    fenofibrate (TRIGLIDE) tablet 160 mg, 160 mg, Oral, Daily, Earl Zafar DO, 160 mg at 01/20/21 2145    budesonide-formoterol (SYMBICORT) 160-4.5 MCG/ACT inhaler 2 puff, 2 puff, Inhalation, BID, Earl Zafar DO    glimepiride (AMARYL) tablet 4 mg, 4 mg, Oral, QAM , Roberto Vargas DO, 4 mg at 01/21/21 1641    hydrALAZINE (APRESOLINE) tablet 25 mg, 25 mg, Oral, BID, [facility], 25 mg at 01/20/21 2145    isosorbide dinitrate (ISORDIL) tablet 10 mg, 10 mg, Oral, BID, [facility], 10 mg at 01/20/21 2145    magnesium oxide (MAG-OX) tablet 400 mg, 400 mg, Oral, Daily, Earl Zafar DO, 400 mg at 01/20/21 2144    metFORMIN (GLUCOPHAGE) tablet 850 mg, 850 mg, Oral, BID WC, [facility], 850 mg at 01/20/21 2145    montelukast (SINGULAIR) tablet 10 mg, 10 mg, Oral, Nightly, Susan Vargas DO, 10 mg at 01/20/21 2144    nitroGLYCERIN (NITROSTAT) SL tablet 0.4 mg, 0.4 mg, Sublingual, Q5 Min PRN, Earl Zafar DO    pantoprazole (PROTONIX) tablet 40 mg, 40 mg, Oral, [facility], Roberto Vargas DO, 40 mg at 01/21/21 0539    raloxifene (EVISTA) tablet 60 mg, 60 mg, Oral, Nightly, Earl Zafar DO, 60 mg at 01/20/21 2141   ticagrelor (BRILINTA) tablet 90 mg, 90 mg, Oral, BID, Sebastiandixie Hall, DO, 90 mg at 01/20/21 2145    sodium chloride flush 0.9 % injection 10 mL, 10 mL, Intravenous, 2 times per day, Sebastian Formanee, DO    sodium chloride flush 0.9 % injection 10 mL, 10 mL, Intravenous, PRN, Sebastiandixie Formanee, DO    promethazine (PHENERGAN) tablet 12.5 mg, 12.5 mg, Oral, Q6H PRN **OR** ondansetron (ZOFRAN) injection 4 mg, 4 mg, Intravenous, Q6H PRN, Sebastian Formanee, DO    acetaminophen (TYLENOL) tablet 650 mg, 650 mg, Oral, Q6H PRN **OR** acetaminophen (TYLENOL) suppository 650 mg, 650 mg, Rectal, Q6H PRN, Sebastian Hall, DO    Assessment:    Active Problems:    Anemia  Resolved Problems:    * No resolved hospital problems. 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 She is intolerant to oral iron, she finished a course of Feraheme early December. She had a port placed last week . Last EGD/colonoscopy were in December 2020.  -She presented with low hemoglobin from the [facility] of 6.1 today. She is s/p 2 units of blood.      Plan:  Maintain hemoglobin greater than 7.5g/dL- hemoglobin is 8.6, repeat 8.7  Anemia is multifactorial including possible chronic GI bleeding as well as CKD  She is OK to discharge, she will follow up at the [facility] next week for lab check      Electronically signed by CHELA Shah NP on 1/21/2021 at 8:20 AM

## 2021-01-21 NOTE — H&P
No current facility-administered medications on file prior to encounter. Current Outpatient Medications on File Prior to Encounter   Medication Sig Dispense Refill    glimepiride (AMARYL) 4 MG tablet Take 4 mg by mouth every morning (before breakfast)      pantoprazole (PROTONIX) 40 MG tablet Take 1 tablet by mouth every morning (before breakfast) 30 tablet 3    isosorbide dinitrate (ISORDIL) 20 MG tablet Take 1 tablet by mouth 2 times daily (Patient taking differently: Take 10 mg by mouth 2 times daily ) 90 tablet 3    hydrALAZINE (APRESOLINE) 25 MG tablet Take 1 tablet by mouth 2 times daily 90 tablet 3    aspirin 81 MG EC tablet Take 1 tablet by mouth daily 30 tablet 3    metFORMIN (GLUCOPHAGE) 500 MG tablet Take 3 tablets by mouth 2 times daily (with meals) Resume this on 11/13/20 with supper dose. 60 tablet 3    ezetimibe (ZETIA) 10 MG tablet Take 1 tablet by mouth nightly 30 tablet 3    ticagrelor (BRILINTA) 90 MG TABS tablet Take 1 tablet by mouth 2 times daily 60 tablet 5    empagliflozin (JARDIANCE) 25 MG tablet Take 25 mg by mouth daily      raloxifene (EVISTA) 60 MG tablet Take 60 mg by mouth nightly      magnesium oxide (MAG-OX) 400 MG tablet Take 400 mg by mouth daily.  montelukast (SINGULAIR) 10 MG tablet Take 10 mg by mouth nightly.  fenofibrate 160 MG tablet Take 160 mg by mouth nightly.  Cholecalciferol (VITAMIN D-3 PO) Take 2,000 Units by mouth daily       nitroGLYCERIN (NITROSTAT) 0.4 MG SL tablet up to max of 3 total doses. If no relief after 1 dose, call 911. 25 tablet 3    fluticasone-salmeterol (ADVAIR) 250-50 MCG/DOSE AEPB Inhale 1 puff into the lungs as needed. Is weaning off as of 2/5/2014         Allergies   Allergen Reactions    Aspirin Hives     Patient takes 81mg aspirin daily, no reaction to medication.     Nsaids Rash    Statins Other (See Comments)     Muscle weakness       Social History     Socioeconomic History    Marital status:  Spouse name: Not on file    Number of children: Not on file    Years of education: Not on file    Highest education level: Not on file   Occupational History    Not on file   Social Needs    Financial resource strain: Not on file    Food insecurity     Worry: Not on file     Inability: Not on file    Transportation needs     Medical: Not on file     Non-medical: Not on file   Tobacco Use    Smoking status: Never Smoker    Smokeless tobacco: Never Used   Substance and Sexual Activity    Alcohol use: Yes     Alcohol/week: 0.0 standard drinks     Comment: socially    Drug use: No    Sexual activity: Not Currently     Partners: Male   Lifestyle    Physical activity     Days per week: Not on file     Minutes per session: Not on file    Stress: Not on file   Relationships    Social connections     Talks on phone: Not on file     Gets together: Not on file     Attends [employer] service: Not on file     Active member of club or organization: Not on file     Attends meetings of clubs or organizations: Not on file     Relationship status: Not on file    Intimate partner violence     Fear of current or ex partner: Not on file     Emotionally abused: Not on file     Physically abused: Not on file     Forced sexual activity: Not on file   Other Topics Concern    Not on file   Social History Narrative    Not on file       Family History   Problem Relation Age of Onset    Stroke Mother     Cancer Father         colon    Heart Disease Father        Blood pressure (!) 135/58, pulse 78, temperature 98.3 °F (36.8 °C), temperature source Oral, resp. rate 16, height 5' 3\" (1.6 m), weight 115 lb (52.2 kg), SpO2 100 %, not currently breastfeeding.   awake, alert  Hydrated, calm  No jvd or adenopathy  Heart rrr  Lungs ctab  abd soft, good bs, no pain  No edema    A; Acute on chronic anemia, multi factoral  Cad, htn  dmii with ckd3  Will dc, fu with pcp

## 2021-01-22 ENCOUNTER — HOSPITAL ENCOUNTER (OUTPATIENT)
Age: 76
Setting detail: OBSERVATION
Discharge: HOME | End: 2021-01-23
Attending: EMERGENCY MEDICINE | Admitting: INTERNAL MEDICINE
Payer: MEDICARE

## 2021-01-22 DIAGNOSIS — E16.2 HYPOGLYCEMIA: Primary | ICD-10-CM

## 2021-01-22 LAB
ALBUMIN SERPL-MCNC: 3.4 G/DL (ref 3.5–5.2)
ALP BLD-CCNC: 40 U/L (ref 35–104)
ALT SERPL-CCNC: 12 U/L (ref 0–32)
ANION GAP SERPL CALCULATED.3IONS-SCNC: 11 MMOL/L (ref 7–16)
AST SERPL-CCNC: 42 U/L (ref 0–31)
BASOPHILS ABSOLUTE: 0.01 E9/L (ref 0–0.2)
BASOPHILS RELATIVE PERCENT: 0.2 % (ref 0–2)
BILIRUB SERPL-MCNC: 0.4 MG/DL (ref 0–1.2)
BUN BLDV-MCNC: 36 MG/DL (ref 8–23)
CALCIUM SERPL-MCNC: 8.7 MG/DL (ref 8.6–10.2)
CHLORIDE BLD-SCNC: 105 MMOL/L (ref 98–107)
CHP ED QC CHECK: NORMAL
CO2: 21 MMOL/L (ref 22–29)
CREAT SERPL-MCNC: 2 MG/DL (ref 0.5–1)
EOSINOPHILS ABSOLUTE: 0 E9/L (ref 0.05–0.5)
EOSINOPHILS RELATIVE PERCENT: 0 % (ref 0–6)
GFR AFRICAN AMERICAN: 29
GFR NON-AFRICAN AMERICAN: 24 ML/MIN/1.73
GLUCOSE BLD-MCNC: 126 MG/DL
GLUCOSE BLD-MCNC: 144 MG/DL (ref 74–99)
HCT VFR BLD CALC: 28.3 % (ref 34–48)
HEMOGLOBIN: 9.2 G/DL (ref 11.5–15.5)
IMMATURE GRANULOCYTES #: 0.07 E9/L
IMMATURE GRANULOCYTES %: 1.4 % (ref 0–5)
LYMPHOCYTES ABSOLUTE: 0.23 E9/L (ref 1.5–4)
LYMPHOCYTES RELATIVE PERCENT: 4.4 % (ref 20–42)
MCH RBC QN AUTO: 27.2 PG (ref 26–35)
MCHC RBC AUTO-ENTMCNC: 32.5 % (ref 32–34.5)
MCV RBC AUTO: 83.7 FL (ref 80–99.9)
METER GLUCOSE: 126 MG/DL (ref 74–99)
MONOCYTES ABSOLUTE: 0.27 E9/L (ref 0.1–0.95)
MONOCYTES RELATIVE PERCENT: 5.2 % (ref 2–12)
NEUTROPHILS ABSOLUTE: 4.6 E9/L (ref 1.8–7.3)
NEUTROPHILS RELATIVE PERCENT: 88.8 % (ref 43–80)
PDW BLD-RTO: 24.4 FL (ref 11.5–15)
PLATELET # BLD: 236 E9/L (ref 130–450)
PMV BLD AUTO: 8.6 FL (ref 7–12)
POTASSIUM SERPL-SCNC: 4.1 MMOL/L (ref 3.5–5)
RBC # BLD: 3.38 E12/L (ref 3.5–5.5)
SODIUM BLD-SCNC: 137 MMOL/L (ref 132–146)
TOTAL PROTEIN: 5.2 G/DL (ref 6.4–8.3)
WBC # BLD: 5.2 E9/L (ref 4.5–11.5)

## 2021-01-22 PROCEDURE — 36415 COLL VENOUS BLD VENIPUNCTURE: CPT

## 2021-01-22 PROCEDURE — 99284 EMERGENCY DEPT VISIT MOD MDM: CPT

## 2021-01-22 PROCEDURE — 85025 COMPLETE CBC W/AUTO DIFF WBC: CPT

## 2021-01-22 PROCEDURE — 2580000003 HC RX 258: Performed by: EMERGENCY MEDICINE

## 2021-01-22 PROCEDURE — 82962 GLUCOSE BLOOD TEST: CPT

## 2021-01-22 PROCEDURE — G0378 HOSPITAL OBSERVATION PER HR: HCPCS

## 2021-01-22 PROCEDURE — 80053 COMPREHEN METABOLIC PANEL: CPT

## 2021-01-22 RX ORDER — ASPIRIN 81 MG/1
81 TABLET ORAL DAILY
Status: DISCONTINUED | OUTPATIENT
Start: 2021-01-22 | End: 2021-01-23 | Stop reason: HOSPADM

## 2021-01-22 RX ORDER — ISOSORBIDE DINITRATE 10 MG/1
10 TABLET ORAL 2 TIMES DAILY
Status: DISCONTINUED | OUTPATIENT
Start: 2021-01-22 | End: 2021-01-23 | Stop reason: HOSPADM

## 2021-01-22 RX ORDER — SODIUM BICARBONATE 650 MG/1
650 TABLET ORAL 2 TIMES DAILY
COMMUNITY
End: 2021-01-22

## 2021-01-22 RX ORDER — ACETAMINOPHEN 650 MG/1
650 SUPPOSITORY RECTAL EVERY 6 HOURS PRN
Status: DISCONTINUED | OUTPATIENT
Start: 2021-01-22 | End: 2021-01-23 | Stop reason: HOSPADM

## 2021-01-22 RX ORDER — HYDRALAZINE HYDROCHLORIDE 25 MG/1
25 TABLET, FILM COATED ORAL 2 TIMES DAILY
Status: DISCONTINUED | OUTPATIENT
Start: 2021-01-22 | End: 2021-01-23 | Stop reason: HOSPADM

## 2021-01-22 RX ORDER — PROMETHAZINE HYDROCHLORIDE 25 MG/1
12.5 TABLET ORAL EVERY 6 HOURS PRN
Status: DISCONTINUED | OUTPATIENT
Start: 2021-01-22 | End: 2021-01-23 | Stop reason: HOSPADM

## 2021-01-22 RX ORDER — ONDANSETRON 2 MG/ML
4 INJECTION INTRAMUSCULAR; INTRAVENOUS EVERY 6 HOURS PRN
Status: DISCONTINUED | OUTPATIENT
Start: 2021-01-22 | End: 2021-01-23 | Stop reason: HOSPADM

## 2021-01-22 RX ORDER — BUDESONIDE AND FORMOTEROL FUMARATE DIHYDRATE 80; 4.5 UG/1; UG/1
2 AEROSOL RESPIRATORY (INHALATION) 2 TIMES DAILY
Status: DISCONTINUED | OUTPATIENT
Start: 2021-01-22 | End: 2021-01-22 | Stop reason: CLARIF

## 2021-01-22 RX ORDER — ACETAMINOPHEN 325 MG/1
650 TABLET ORAL EVERY 6 HOURS PRN
Status: DISCONTINUED | OUTPATIENT
Start: 2021-01-22 | End: 2021-01-23 | Stop reason: HOSPADM

## 2021-01-22 RX ORDER — EZETIMIBE 10 MG/1
10 TABLET ORAL NIGHTLY
Status: DISCONTINUED | OUTPATIENT
Start: 2021-01-22 | End: 2021-01-23 | Stop reason: HOSPADM

## 2021-01-22 RX ORDER — PANTOPRAZOLE SODIUM 40 MG/1
40 TABLET, DELAYED RELEASE ORAL
Status: DISCONTINUED | OUTPATIENT
Start: 2021-01-23 | End: 2021-01-23 | Stop reason: HOSPADM

## 2021-01-22 RX ORDER — GLIMEPIRIDE 4 MG/1
4 TABLET ORAL
Status: DISCONTINUED | OUTPATIENT
Start: 2021-01-23 | End: 2021-01-23

## 2021-01-22 RX ORDER — RALOXIFENE HYDROCHLORIDE 60 MG/1
60 TABLET, FILM COATED ORAL NIGHTLY
Status: DISCONTINUED | OUTPATIENT
Start: 2021-01-22 | End: 2021-01-22 | Stop reason: CLARIF

## 2021-01-22 RX ORDER — ARFORMOTEROL TARTRATE 15 UG/2ML
15 SOLUTION RESPIRATORY (INHALATION) 2 TIMES DAILY
Status: DISCONTINUED | OUTPATIENT
Start: 2021-01-22 | End: 2021-01-23 | Stop reason: HOSPADM

## 2021-01-22 RX ORDER — SODIUM CHLORIDE 0.9 % (FLUSH) 0.9 %
10 SYRINGE (ML) INJECTION EVERY 12 HOURS SCHEDULED
Status: DISCONTINUED | OUTPATIENT
Start: 2021-01-22 | End: 2021-01-23 | Stop reason: HOSPADM

## 2021-01-22 RX ORDER — MONTELUKAST SODIUM 10 MG/1
10 TABLET ORAL NIGHTLY
Status: DISCONTINUED | OUTPATIENT
Start: 2021-01-22 | End: 2021-01-23 | Stop reason: HOSPADM

## 2021-01-22 RX ORDER — SODIUM CHLORIDE 0.9 % (FLUSH) 0.9 %
10 SYRINGE (ML) INJECTION PRN
Status: DISCONTINUED | OUTPATIENT
Start: 2021-01-22 | End: 2021-01-23 | Stop reason: HOSPADM

## 2021-01-22 RX ORDER — 0.9 % SODIUM CHLORIDE 0.9 %
500 INTRAVENOUS SOLUTION INTRAVENOUS ONCE
Status: COMPLETED | OUTPATIENT
Start: 2021-01-22 | End: 2021-01-22

## 2021-01-22 RX ORDER — FENOFIBRATE 160 MG/1
160 TABLET ORAL DAILY
Status: DISCONTINUED | OUTPATIENT
Start: 2021-01-22 | End: 2021-01-23 | Stop reason: HOSPADM

## 2021-01-22 RX ORDER — ISOSORBIDE DINITRATE 10 MG/1
10 TABLET ORAL 2 TIMES DAILY
COMMUNITY

## 2021-01-22 RX ORDER — CHOLECALCIFEROL (VITAMIN D3) 50 MCG
2000 TABLET ORAL DAILY
Status: DISCONTINUED | OUTPATIENT
Start: 2021-01-22 | End: 2021-01-23 | Stop reason: HOSPADM

## 2021-01-22 RX ORDER — BUDESONIDE 0.25 MG/2ML
0.25 INHALANT ORAL 2 TIMES DAILY
Status: DISCONTINUED | OUTPATIENT
Start: 2021-01-22 | End: 2021-01-23 | Stop reason: HOSPADM

## 2021-01-22 RX ADMIN — SODIUM CHLORIDE 500 ML: 9 INJECTION, SOLUTION INTRAVENOUS at 16:27

## 2021-01-22 NOTE — ED PROVIDER NOTES
Department of Emergency Medicine   ED  Provider Note  Admit Date/RoomTime: 1/22/2021  3:45 PM  ED [facility]          History of Present Illness:  1/22/21, Time: 3:55 PM EST  Chief Complaint   Patient presents with    Hypoglycemia     BGL 30 PER EMS patient not eating because she can't taste it                 Iliana Jackson is a 77 y.o. female presenting to the ED for hypoglycemia, beginning prior to arrival.  The complaint has been intermittent, severe in severity, and worsened by nothing. Presents for evaluation of hyperglycemia. EMS reports that she was minimally responsive at home and found to be hypoglycemic. She was given dextrose with resolution of her symptoms. She reports that she does not like to eat much because it causes her gastrointestinal bleeding to be worse. She reports she has chronic GI bleeding secondary to Brilinta and chronic gastrointestinal AVMs. She says this is not new. She reports she had a recent blood transfusion for this. She denies fever chills. No chest pain or shortness of breath. She denies any other complaints. She says she is starting to feel better now other than a little shaky. Review of Systems:   A complete review of systems was performed and pertinent positives and negatives are stated within HPI, all other systems reviewed and are negative.        --------------------------------------------- PAST HISTORY ---------------------------------------------  Past Medical History:  has a past medical history of Anemia, Arthritis, Asthma, Chronic systolic (congestive) heart failure ([facility] [address] Colon polyps, Diabetes ([facility] [address] Environmental allergies, Full dentures, Gastric ulcer, GERD (gastroesophageal reflux disease), High cholesterol, History of blood transfusion, Hypertension, Osteopenia, Poor venous access, and Post-operative state. Past Surgical History:  has a past surgical history that includes Knee Arthrocentesis; Knee arthroscopy (Left, 1980's); Cholecystectomy, open (1980's early); Tubal ligation; Endoscopy, colon, diagnostic; Upper gastrointestinal endoscopy (04/18/2014); Appendectomy; other surgical history (10/13/2014); Cardiac catheterization (11/11/2020); Coronary angioplasty with stent (11/11/2020); Upper gastrointestinal endoscopy (N/A, 12/04/2020); Colonoscopy (N/A, 12/05/2020); and Port Surgery (N/A, 1/13/2021). Social History:  reports that she has never smoked. She has never used smokeless tobacco. She reports current alcohol use. She reports that she does not use drugs. Family History: family history includes Cancer in her father; Heart Disease in her father; Stroke in her mother. . Unless otherwise noted, family history is non contributory    The patients home medications have been reviewed. Allergies: Aspirin, Nsaids, and Statins    I have reviewed the past medical history, past surgical history, social history, and family history    ---------------------------------------------------PHYSICAL EXAM--------------------------------------    Constitutional/General: Alert and oriented x3  Head: Normocephalic and atraumatic  Eyes: PERRL, EOMI, sclera non icteric  Mouth: Oropharynx clear, handling secretions  Neck: Supple, full ROM, no stridor, no meningeal signs  Respiratory: Lungs clear to auscultation bilaterally, no wheezes, rales, or rhonchi. Not in respiratory distress  Cardiovascular:  Regular rate. Regular rhythm. No murmurs, no aortic murmurs, no gallops, no rubs. 2+ distal pulses. Equal extremity pulses. Chest: No chest wall tenderness  Gastrointestinal:  Abdomen Soft, Non tender, Non distended. No rebound, guarding, or rigidity. No pulsatile masses. Musculoskeletal: Moves all extremities x 4. Warm and well perfused, no clubbing, no cyanosis, no edema.  Capillary refill <3 seconds Skin: skin warm and dry. No rashes. Neurologic: GCS 15, no focal deficits, symmetric strength 5/5 in the upper and lower extremities bilaterally  Psychiatric: Normal Affect          -------------------------------------------------- RESULTS -------------------------------------------------  I have personally reviewed all laboratory and imaging results for this patient. Results are listed below.      LABS: (Lab results interpreted by me)  Results for orders placed or performed during the hospital encounter of 01/22/21   CBC Auto Differential   Result Value Ref Range    WBC 5.2 4.5 - 11.5 E9/L    RBC 3.38 (L) 3.50 - 5.50 E12/L    Hemoglobin 9.2 (L) 11.5 - 15.5 g/dL    Hematocrit 28.3 (L) 34.0 - 48.0 %    MCV 83.7 80.0 - 99.9 fL    MCH 27.2 26.0 - 35.0 pg    MCHC 32.5 32.0 - 34.5 %    RDW 24.4 (H) 11.5 - 15.0 fL    Platelets 690 476 - 439 E9/L    MPV 8.6 7.0 - 12.0 fL    Neutrophils % 88.8 (H) 43.0 - 80.0 %    Immature Granulocytes % 1.4 0.0 - 5.0 %    Lymphocytes % 4.4 (L) 20.0 - 42.0 %    Monocytes % 5.2 2.0 - 12.0 %    Eosinophils % 0.0 0.0 - 6.0 %    Basophils % 0.2 0.0 - 2.0 %    Neutrophils Absolute 4.60 1.80 - 7.30 E9/L    Immature Granulocytes # 0.07 E9/L    Lymphocytes Absolute 0.23 (L) 1.50 - 4.00 E9/L    Monocytes Absolute 0.27 0.10 - 0.95 E9/L    Eosinophils Absolute 0.00 (L) 0.05 - 0.50 E9/L    Basophils Absolute 0.01 0.00 - 0.20 E9/L   Comprehensive Metabolic Panel   Result Value Ref Range    Sodium 137 132 - 146 mmol/L    Potassium 4.1 3.5 - 5.0 mmol/L    Chloride 105 98 - 107 mmol/L    CO2 21 (L) 22 - 29 mmol/L    Anion Gap 11 7 - 16 mmol/L    Glucose 144 (H) 74 - 99 mg/dL    BUN 36 (H) 8 - 23 mg/dL    CREATININE 2.0 (H) 0.5 - 1.0 mg/dL    GFR Non-African American 24 >=60 mL/min/1.73    GFR African American 29     Calcium 8.7 8.6 - 10.2 mg/dL    Total Protein 5.2 (L) 6.4 - 8.3 g/dL    Alb 3.4 (L) 3.5 - 5.2 g/dL    Total Bilirubin 0.4 0.0 - 1.2 mg/dL    Alkaline Phosphatase 40 35 - 104 U/L ALT 12 0 - 32 U/L    AST 42 (H) 0 - 31 U/L   POCT Glucose   Result Value Ref Range    Glucose 126 mg/dL    QC OK? y    POCT Glucose   Result Value Ref Range    Meter Glucose 126 (H) 74 - 99 mg/dL   ,       RADIOLOGY:  Interpreted by Radiologist unless otherwise specified  No orders to display          ------------------------- NURSING NOTES AND VITALS REVIEWED ---------------------------   The nursing notes within the ED encounter and vital signs as below have been reviewed by myself  BP (!) 172/78   Pulse 71   Temp 97.1 °F (36.2 °C) (Temporal)   Resp 16   SpO2 100%     Oxygen Saturation Interpretation: Normal    The patients available past medical records and past encounters were reviewed. ------------------------------ ED COURSE/MEDICAL DECISION MAKING----------------------  Medications   0.9 % sodium chloride bolus (500 mLs Intravenous New Bag 1/22/21 5092)            I, Dr. Sushant Villatoro, am the primary provider of record    Medical Decision Making:   Hypoglycemia, likely from not eating. She ate here and BGL improved and remained elevated. Hgb above her last check. No indication for admission. Hemodynamically stable. Appropriate for dc home. Oxygen Saturation Interpretation: 100 % on RA. Normal      Re-Evaluations:       improving      This patient's ED course included: a personal history and physicial examination, re-evaluation prior to disposition and IV medications    This patient has remained hemodynamically stable during their ED course. Counseling: The emergency provider has spoken with the patient and discussed todays results, in addition to providing specific details for the plan of care and counseling regarding the diagnosis and prognosis. Questions are answered at this time and they are agreeable with the plan.       --------------------------------- IMPRESSION AND DISPOSITION ---------------------------------    IMPRESSION  1.  Hypoglycemia        DISPOSITION Disposition: Discharge to home  Patient condition is good        NOTE: This report was transcribed using voice recognition software. Every effort was made to ensure accuracy; however, inadvertent computerized transcription errors may be present       Olive Dave MD  01/22/21 6297      Addendum:  I was informed by nursing that the patient lives alone and she is having trouble care for self. She says she is not eating much and is worried that she is going to go home and fall or have difficulties. Her family lives in [place] and is currently in route here but will not be here until tomorrow.   She asked if I could call her family doctor and see if he would admit her to the hospital.  I called and spoke with Dr. Jun Hudson covering her family doctor, discussed the case and the social situation, he will admit her to the hospital.     Olive Dave MD  01/22/21 2011

## 2021-01-22 NOTE — ED NOTES
Patient states she does not feel safe going home alone. Patient reports she is weak and unable to care for herself. Received call from patient's daughter, Pratik Leach, requesting that patient be admitted and/or kept till the morning so that she can come to get her.  Will speak with MD Soto regarding dispo     Paola Kirk RN  01/22/21 5651

## 2021-01-22 NOTE — ED NOTES
MD Soto to speak with patient's family doctor regarding dispo options.  Discharge held at this time     Jane Devine RN  01/22/21 4817

## 2021-01-23 VITALS
OXYGEN SATURATION: 98 % | DIASTOLIC BLOOD PRESSURE: 67 MMHG | RESPIRATION RATE: 18 BRPM | WEIGHT: 115 LBS | BODY MASS INDEX: 20.38 KG/M2 | HEART RATE: 68 BPM | SYSTOLIC BLOOD PRESSURE: 134 MMHG | TEMPERATURE: 98.8 F | HEIGHT: 63 IN

## 2021-01-23 LAB
GLUCOSE BLD-MCNC: 188 MG/DL (ref 74–99)
METER GLUCOSE: 130 MG/DL (ref 74–99)
METER GLUCOSE: 261 MG/DL (ref 74–99)
METER GLUCOSE: 270 MG/DL (ref 74–99)
METER GLUCOSE: <40 MG/DL (ref 74–99)
METER GLUCOSE: <40 MG/DL (ref 74–99)

## 2021-01-23 PROCEDURE — 96375 TX/PRO/DX INJ NEW DRUG ADDON: CPT

## 2021-01-23 PROCEDURE — 82947 ASSAY GLUCOSE BLOOD QUANT: CPT

## 2021-01-23 PROCEDURE — 2580000003 HC RX 258: Performed by: INTERNAL MEDICINE

## 2021-01-23 PROCEDURE — 6370000000 HC RX 637 (ALT 250 FOR IP): Performed by: INTERNAL MEDICINE

## 2021-01-23 PROCEDURE — 36415 COLL VENOUS BLD VENIPUNCTURE: CPT

## 2021-01-23 PROCEDURE — 2580000003 HC RX 258

## 2021-01-23 PROCEDURE — G0378 HOSPITAL OBSERVATION PER HR: HCPCS

## 2021-01-23 PROCEDURE — 82962 GLUCOSE BLOOD TEST: CPT

## 2021-01-23 PROCEDURE — 96374 THER/PROPH/DIAG INJ IV PUSH: CPT

## 2021-01-23 PROCEDURE — 6360000002 HC RX W HCPCS: Performed by: INTERNAL MEDICINE

## 2021-01-23 RX ORDER — SUCRALFATE 1 G/1
1 TABLET ORAL EVERY 6 HOURS SCHEDULED
Status: DISCONTINUED | OUTPATIENT
Start: 2021-01-23 | End: 2021-01-23 | Stop reason: HOSPADM

## 2021-01-23 RX ORDER — DEXTROSE AND SODIUM CHLORIDE 5; .45 G/100ML; G/100ML
INJECTION, SOLUTION INTRAVENOUS CONTINUOUS
Status: DISCONTINUED | OUTPATIENT
Start: 2021-01-23 | End: 2021-01-23 | Stop reason: HOSPADM

## 2021-01-23 RX ORDER — DEXTROSE MONOHYDRATE 25 G/50ML
12.5 INJECTION, SOLUTION INTRAVENOUS PRN
Status: DISCONTINUED | OUTPATIENT
Start: 2021-01-23 | End: 2021-01-23 | Stop reason: HOSPADM

## 2021-01-23 RX ORDER — NICOTINE POLACRILEX 4 MG
15 LOZENGE BUCCAL PRN
Status: DISCONTINUED | OUTPATIENT
Start: 2021-01-23 | End: 2021-01-23 | Stop reason: HOSPADM

## 2021-01-23 RX ORDER — GLIMEPIRIDE 2 MG/1
2 TABLET ORAL
Status: DISCONTINUED | OUTPATIENT
Start: 2021-01-24 | End: 2021-01-23 | Stop reason: HOSPADM

## 2021-01-23 RX ORDER — SUCRALFATE 1 G/1
1 TABLET ORAL 4 TIMES DAILY
Qty: 120 TABLET | Refills: 3 | Status: SHIPPED | OUTPATIENT
Start: 2021-01-23 | End: 2022-03-10

## 2021-01-23 RX ORDER — DEXTROSE MONOHYDRATE 50 MG/ML
100 INJECTION, SOLUTION INTRAVENOUS PRN
Status: DISCONTINUED | OUTPATIENT
Start: 2021-01-23 | End: 2021-01-23

## 2021-01-23 RX ORDER — GLIMEPIRIDE 2 MG/1
2 TABLET ORAL
Qty: 30 TABLET | Refills: 3 | Status: SHIPPED | OUTPATIENT
Start: 2021-01-24

## 2021-01-23 RX ORDER — ONDANSETRON 4 MG/1
4 TABLET, FILM COATED ORAL EVERY 8 HOURS PRN
Status: DISCONTINUED | OUTPATIENT
Start: 2021-01-23 | End: 2021-01-23 | Stop reason: HOSPADM

## 2021-01-23 RX ORDER — DEXTROSE MONOHYDRATE 25 G/50ML
INJECTION, SOLUTION INTRAVENOUS
Status: COMPLETED
Start: 2021-01-23 | End: 2021-01-23

## 2021-01-23 RX ADMIN — Medication 2000 UNITS: at 00:08

## 2021-01-23 RX ADMIN — HYDRALAZINE HYDROCHLORIDE 25 MG: 25 TABLET, FILM COATED ORAL at 00:08

## 2021-01-23 RX ADMIN — MONTELUKAST SODIUM 10 MG: 10 TABLET ORAL at 00:07

## 2021-01-23 RX ADMIN — TICAGRELOR 90 MG: 90 TABLET ORAL at 00:06

## 2021-01-23 RX ADMIN — ASPIRIN 81 MG: 81 TABLET, COATED ORAL at 00:09

## 2021-01-23 RX ADMIN — ISOSORBIDE DINITRATE 10 MG: 10 TABLET ORAL at 08:20

## 2021-01-23 RX ADMIN — DEXTROSE MONOHYDRATE 12.5 G: 25 INJECTION, SOLUTION INTRAVENOUS at 02:34

## 2021-01-23 RX ADMIN — SODIUM CHLORIDE, PRESERVATIVE FREE 10 ML: 5 INJECTION INTRAVENOUS at 00:09

## 2021-01-23 RX ADMIN — EZETIMIBE 10 MG: 10 TABLET ORAL at 00:07

## 2021-01-23 RX ADMIN — TICAGRELOR 90 MG: 90 TABLET ORAL at 08:21

## 2021-01-23 RX ADMIN — ASPIRIN 81 MG: 81 TABLET, COATED ORAL at 08:21

## 2021-01-23 RX ADMIN — GLIMEPIRIDE 2 MG: 4 TABLET ORAL at 11:52

## 2021-01-23 RX ADMIN — SUCRALFATE 1 G: 1 TABLET ORAL at 08:21

## 2021-01-23 RX ADMIN — Medication 2000 UNITS: at 08:20

## 2021-01-23 RX ADMIN — HYDRALAZINE HYDROCHLORIDE 25 MG: 25 TABLET, FILM COATED ORAL at 08:21

## 2021-01-23 RX ADMIN — FENOFIBRATE 160 MG: 160 TABLET ORAL at 08:20

## 2021-01-23 RX ADMIN — PANTOPRAZOLE SODIUM 40 MG: 40 TABLET, DELAYED RELEASE ORAL at 06:43

## 2021-01-23 RX ADMIN — DEXTROSE AND SODIUM CHLORIDE: 5; 450 INJECTION, SOLUTION INTRAVENOUS at 02:34

## 2021-01-23 RX ADMIN — SUCRALFATE 1 G: 1 TABLET ORAL at 11:46

## 2021-01-23 RX ADMIN — FENOFIBRATE 160 MG: 160 TABLET ORAL at 00:08

## 2021-01-23 RX ADMIN — MAGNESIUM GLUCONATE 500 MG ORAL TABLET 400 MG: 500 TABLET ORAL at 08:20

## 2021-01-23 RX ADMIN — ONDANSETRON 4 MG: 2 INJECTION INTRAMUSCULAR; INTRAVENOUS at 06:44

## 2021-01-23 RX ADMIN — MAGNESIUM GLUCONATE 500 MG ORAL TABLET 400 MG: 500 TABLET ORAL at 00:07

## 2021-01-23 ASSESSMENT — PAIN SCALES - GENERAL: PAINLEVEL_OUTOF10: 0

## 2021-01-23 NOTE — SIGNIFICANT EVENT
Rapid Response Team Note  Date of event: 1/23/2021   Time of event: 01:32 am  Madeleine Rao 77y.o. year old female   YOB: 1945   Admit date:  1/22/2021   Location: 8417/8417   Witnessed? : [x]Yes  [] No  Monitored? : [x]Yes  [] No  Code status: [x] Full  [] DNR-CCA  []DNR-CC  ______________________________________________________________________  Reason for RRT:     RRT called for BG undetectable   Subjective:   Upon arrival the pt was in confusion, nurse at bedside reporting BG undetectable(POCT BG < 40), patient was just admitted from ED, hx of NIDDM2, no hypoglycemic treating orders. Objective:   Vital signs: BP: 150s/70s  /RR: 12  /HR:  80s  /Temp: 36.9    / O2 Sat:  100%  Repeat Vitals: BP:  130/60s  /HR:   80   / O2 Sat: 100%  · General Appearance: alert, appears stated age and cooperative  · Lung: clear to auscultation bilaterally  · Heart: regular rate and rhythm, S1, S2 normal, no murmur, click, rub or gallop  · Abdomen: soft, non-tender; bowel sounds normal; no masses,  no organomegaly  · Extremities:  extremities normal, atraumatic, no cyanosis or edema    · Neurologic: Mental status: Alert, oriented, thought content appropriate   NIHSS Score:       Response to pain:   [x] Yes  [] No  Follow commands:  [x] Yes  [] No  Facial asymmetry:  [] Yes  [x] No  Motor strength:  [x] Equal  [] Focal deficit:     Diagnostic Test:  Blood Sugar:  <40 mg/dL  EKG:      ABG:    No results found for: PH, PCO2, PO2, HCO3, O2SAT    Infusion(s):   [x] D5 1/2 NS:  Rate:40 cc/hr      Imaging:   CXR:     CT:             Laboratory Tests:        Teams Assessment and Plan:  ? Hypoglycemic encephalopathy, NIDDM2, given D50 12.5 once, repeat POCT , mentation back to baseline, obtain BG from venous blood -> 188, will start her on hypoglycemic treatment protocol and check POCT Q4, also started her on D40 1/2 NS at 40 cc for now. unable to Defer primary to address other hypoglycemic agent  ?   ? Disposition:  [x] No transfer   [] Transfer to monitor floor  [] Transfer to: [] MICU [] NICU [] CCU [] SICU        Patients family updated: [] Yes  [x] No   Discussed with:  [x] Critical Care Intensivist: Dr. Jarocho Payan      [] Primary Care Provider: Lina Lau MD      [] Other: ?    Priscilla Martell MD   PGY-3 Internal Medicine   1:59 AM  Attending Physician: Dr. Jarocho Payan

## 2021-01-23 NOTE — H&P
[address]                  [address] [facility],  [facility]                              HISTORY AND PHYSICAL    PATIENT NAME: Alena Otto                   :        1945  MED REC NO:   [id number]                            ROOM:       8417  ACCOUNT NO:   [de-identified]                           ADMIT DATE: 2021  PROVIDER:     Hakan Norris MD    CHIEF COMPLAINT:  Hypoglycemia. HISTORY OF PRESENT ILLNESS:  A 69-year-old woman who has coronary artery  disease and has been placed on Brilinta since 2020, has been having  almost weekly hospitalizations and transfusions due to the bleeding she  suffers from [address] and chronic gastrointestinal arteriovenous  malformations. Over the last several days, she has not been eating and  her blood sugar plummeted where she was found unresponsive with glucoses  in the 30s. She had another episode in the hospital as she has not been  eating very well. Presently, she is alert and oriented and as  suggested, we will have to cut her glimepiride and because of this have  to see if we can improve on the Brilinta that does make her nauseated. PAST MEDICAL HISTORY:  Chronic systolic heart failure. She has had  colonic polyps, diabetes. She has had previous gastric ulcer,  hyperlipidemia, hypertension, and osteopenia. PAST SURGICAL HISTORY:  She has had knee arthroscopy, cholecystectomy,  tubal ligation, appendectomy. She has had coronary angioplasty with  stent. SOCIAL HISTORY:  Does not smoke or drink. FAMILY HISTORY:  She has had cancer in her father. Heart disease in her  father. Stroke in her mother. PHYSICAL EXAMINATION:  VITAL SIGNS:  The temperature is 98.8, pulse 68, respiratory rate is 18,  blood pressure 134/67. HEAD, EYES, EARS, NOSE, AND THROAT:  Unremarkable. NECK:  Carotids +1. LUNGS:  Clear to auscultation. HEART:  S1, S2.  Regular rate and rhythm. ABDOMEN:  Soft, nontender. No liver or spleen. IMPRESSION:  Hypoglycemia secondary to glimepiride, diabetes, chronic  gastrointestinal bleed, medication reaction also to Brilinta causing the  bleeding and causing the nausea. PLAN:  Will try Carafate, try Zofran, and decrease glimepiride.         Kamilla Salmon MD    D: 01/23/2021 7:11:25       T: 01/23/2021 7:22:38     [email or web address]  Job#: [id number]     Doc#: [id number]    CC:

## 2021-01-23 NOTE — PLAN OF CARE
Problem: Falls - Risk of:  Goal: Will remain free from falls  Description: Will remain free from falls  1/23/2021 1006 by Andrea Mijares RN  Outcome: Met This Shift  1/23/2021 0242 by Edy Dolan RN  Outcome: Met This Shift  1/22/2021 2037 by Edy Dolan RN  Outcome: Met This Shift  Goal: Absence of physical injury  Description: Absence of physical injury  1/23/2021 1006 by Andrea Mijares RN  Outcome: Met This Shift  1/23/2021 0242 by Edy Dolan RN  Outcome: Met This Shift  1/22/2021 2037 by Edy Dolan RN  Outcome: Met This Shift

## 2021-01-23 NOTE — PROGRESS NOTES
Nurse found patient mumbling and unresponsive, nurse checked blood sugar and it read too low. Nurse could not reach the physician. There were no hypoglycemic protocols ordered. Nurse called an RRT. Doctors put orders in, patient stable and conversant after amp of D50 given.

## 2021-01-23 NOTE — PROGRESS NOTES
Pharmacy Note    Iliana Jackson was ordered Evista. Per the [address], this medication is non-formulary and not stocked by pharmacy for the reason indicated below. The medication can be reordered at discharge.      Medications in which risks outweigh benefits during hospitalization:         -  oral bisphosphonates         -  raloxifene (Evista)      Medications that lack necessity during an acute hospital stay:      -  nasal antihistamines        -  nasal ipratropium 0.03% and 0.06%        -  nasal miacalcin        -  acyclovir topical cream/ointment orders for herpes labialis (cold sores)

## 2021-01-23 NOTE — PLAN OF CARE
Problem: Falls - Risk of:  Goal: Will remain free from falls  Description: Will remain free from falls  1/23/2021 0242 by Ramona Shi RN  Outcome: Met This Shift  1/22/2021 2037 by Ramona Shi RN  Outcome: Met This Shift  Goal: Absence of physical injury  Description: Absence of physical injury  1/23/2021 0242 by Ramona Shi RN  Outcome: Met This Shift  1/22/2021 2037 by Ramona Shi RN  Outcome: Met This Shift

## 2021-01-23 NOTE — PLAN OF CARE
Problem: Falls - Risk of:  Goal: Will remain free from falls  Description: Will remain free from falls  1/23/2021 1410 by Néstor Vides RN  Outcome: Met This Shift  1/23/2021 1006 by Néstor Vides RN  Outcome: Met This Shift  1/23/2021 0242 by Roya Kellogg RN  Outcome: Met This Shift  Goal: Absence of physical injury  Description: Absence of physical injury  1/23/2021 1410 by Néstor Vides RN  Outcome: Met This Shift  1/23/2021 1006 by Néstor Vides RN  Outcome: Met This Shift  1/23/2021 0242 by Roya Kellogg RN  Outcome: Met This Shift

## 2021-01-23 NOTE — PLAN OF CARE
Problem: Falls - Risk of:  Goal: Will remain free from falls  Description: Will remain free from falls  1/23/2021 1410 by Broderick Castillo RN  Outcome: Completed  1/23/2021 1410 by Broderick Castillo RN  Outcome: Met This Shift  1/23/2021 1006 by Broderick Castillo RN  Outcome: Met This Shift  1/23/2021 0242 by Mary Cabello RN  Outcome: Met This Shift  Goal: Absence of physical injury  Description: Absence of physical injury  1/23/2021 1410 by Broderick Castillo RN  Outcome: Completed  1/23/2021 1410 by Broderick Castillo RN  Outcome: Met This Shift  1/23/2021 1006 by Broderick Castillo RN  Outcome: Met This Shift  1/23/2021 0242 by Mary Cabello RN  Outcome: Met This Shift

## 2021-01-23 NOTE — PROGRESS NOTES
Spoke with Dr Isaiah Kennedy as blood glucose is 261. Informed him pt refused amaryl this am fearful of sugar dropping. IV fluids are stopped per order earlier. As per his request I will offer amaryl again to pt.

## 2021-01-24 NOTE — DISCHARGE SUMMARY
Discharge Summary     Patient ID:  Ailyn Kimball  [id number]  13 y.o. 1945 female  Berkley Meyer MD        Admit date: 1/22/2021    Discharge date and time: 1/23/2021      Activity level: ambulatory  Disposition:home  Condition on Discharge:fair    Admit Diagnoses: hypoglycemia    Discharge Diagnoses: hypoglycemia secondary to sulfonylurea. Also secondary to lack of calorie intake. Chronic gastrointestinal bleed    Consults:  IP CONSULT TO FAMILY MEDICINE    Procedures:     Hospital Course: 78-year-old had not been eaten very well at home. She is on Amaryl 4 mg a day as well as jardiance. She has chronic AV malformations she again had a low blood sugar at the hospital and required IV dextrose.   She was told to take half her dosage anti-regular and will be discharged home      LABS:  Recent Labs     01/22/21  1559 01/22/21  1620 01/23/21  0155   NA  --  137  --    K  --  4.1  --    CL  --  105  --    CO2  --  21*  --    BUN  --  36*  --    CREATININE  --  2.0*  --    GLUCOSE 126 144* 188*   CALCIUM  --  8.7  --        Recent Labs     01/22/21  1620   WBC 5.2   RBC 3.38*   HGB 9.2*   HCT 28.3*   MCV 83.7   MCH 27.2   MCHC 32.5   RDW 24.4*      MPV 8.6       Recent Labs     01/23/21  1116   GLUMET 261*         Patient Instructions:   Discharge Medication List as of 1/23/2021  2:16 PM      START taking these medications    Details   sucralfate (CARAFATE) 1 GM tablet Take 1 tablet by mouth 4 times daily, Disp-120 tablet, R-3Normal         CONTINUE these medications which have CHANGED    Details   glimepiride (AMARYL) 2 MG tablet Take 1 tablet by mouth every morning (before breakfast), Disp-30 tablet, R-3Normal         CONTINUE these medications which have NOT CHANGED    Details   metFORMIN (GLUCOPHAGE) 500 MG tablet Take 500 mg by mouth 2 times daily (with meals) Historical Med      isosorbide dinitrate (ISORDIL) 10 MG tablet Take 10 mg by mouth 2 times dailyHistorical Med pantoprazole (PROTONIX) 40 MG tablet Take 1 tablet by mouth every morning (before breakfast), Disp-30 tablet, R-3Normal      hydrALAZINE (APRESOLINE) 25 MG tablet Take 1 tablet by mouth 2 times daily, Disp-90 tablet, R-3Normal      aspirin 81 MG EC tablet Take 1 tablet by mouth daily, Disp-30 tablet, R-3Normal      ezetimibe (ZETIA) 10 MG tablet Take 1 tablet by mouth nightly, Disp-30 tablet, R-3Normal      ticagrelor (BRILINTA) 90 MG TABS tablet Take 1 tablet by mouth 2 times daily, Disp-60 tablet, R-5Normal      empagliflozin (JARDIANCE) 25 MG tablet Take 25 mg by mouth dailyHistorical Med      raloxifene (EVISTA) 60 MG tablet Take 60 mg by mouth nightlyHistorical Med      magnesium oxide (MAG-OX) 400 MG tablet Take 400 mg by mouth daily. Historical Med      montelukast (SINGULAIR) 10 MG tablet Take 10 mg by mouth nightly. Historical Med      fenofibrate 160 MG tablet Take 160 mg by mouth daily Historical Med      Cholecalciferol (VITAMIN D-3) 25 MCG (1000 UT) CAPS Take 2,000 Units by mouth daily Historical Med      nitroGLYCERIN (NITROSTAT) 0.4 MG SL tablet up to max of 3 total doses.  If no relief after 1 dose, call 911., Disp-25 tablet, R-3Normal         STOP taking these medications       sodium bicarbonate 650 MG tablet Comments:   Reason for Stopping:         fluticasone-salmeterol (ADVAIR) 250-50 MCG/DOSE AEPB Comments:   Reason for Stopping:                   Signed:  Electronically signed by Fabian Nieves MD on 1/24/2021 at 12:02 PM

## 2021-01-25 ENCOUNTER — CARE COORDINATION (OUTPATIENT)
Dept: CASE MANAGEMENT | Age: 76
End: 2021-01-25

## 2021-01-26 ENCOUNTER — CARE COORDINATION (OUTPATIENT)
Dept: CASE MANAGEMENT | Age: 76
End: 2021-01-26

## 2021-01-26 NOTE — CARE COORDINATION
[facility] Transitions Initial Follow Up Call    Call within 2 business days of discharge: Yes    Patient: Dev Gamble Patient : 1945   MRN: [id number]  Reason for Admission: hypoglycemia  Discharge Date: 21 RARS: Readmission Risk Score: 33      Last Discharge [address] [address]       Complaint Diagnosis Description Type Department Provider    21 Hypoglycemia Hypoglycemia ED to Hosp-Admission (Discharged) (ADMITTED) [facility] 8WE Hellen Tellez MD; [address] ..        -Second attempt to reach the patient for initial BPCI call post hospital discharge. Message left with CTN's contact information requesting return phone call. Follow Up  No future appointments.     Tremaine Salomon RN

## 2021-02-02 ENCOUNTER — CARE COORDINATION (OUTPATIENT)
Dept: CASE MANAGEMENT | Age: 76
End: 2021-02-02

## 2021-02-02 NOTE — CARE COORDINATION
[facility] Transitions Follow Up Call    2021    Patient: Tate Covarrubias  Patient : 1945   MRN: [id number]  Reason for Admission:   Discharge Date: 21 RARS: Readmission Risk Score: 33    Attempted to reach patient by phone for follow up; BPCI-A. HIPAA compliant message left on patient's voicemail; CTN contact information provided.         Bishop Ted RN

## 2021-02-08 ENCOUNTER — CARE COORDINATION (OUTPATIENT)
Dept: CASE MANAGEMENT | Age: 76
End: 2021-02-08

## 2021-02-08 NOTE — CARE COORDINATION
[facility] Transitions Initial Follow Up Call    Call within 2 business days of discharge: No    Patient: Ramy Mcdonough Patient : 1945   MRN: <[id number]>  Reason for Admission:  Discharge Date: 21 RARS: Readmission Risk Score: 33      Last Discharge [facility]       Complaint Diagnosis Description Type Department Provider    21 Hypoglycemia Hypoglycemia ED to Hosp-Admission (Discharged) (ADMITTED) [facility] 8WE Jaci Santizo MD; [address] [facility]. .. Spoke with: Ramy Mcdonough, patient    Facility:  [facility]    Follow Up:  Patient was readmitted 21-21 for observation for hypoglycemia. Contacted patient for initial transition follow up. Spoke briefly with patient who was short with CTN. Pat she is doing fine. Reports she is monitoring her blood sugars closely. Blood sugar was 88 today. She stated she is eating 3 meals a day. Lives with her daughter who helps her. No episodes of hypoglycemia since admission. She also denies having any lightheadedness/dizziness, shortness of breath, chest pain/discomfort, numbness, tingling. Patient declined to review medication list but stated she has everything. Declined further BPCI-A follow up calls. She stated she is aware of when to contact her doctor. Informed her that this will be the final BPCI-A follow up call. No questions or concerns at this time. No future appointments.     Casandra Baird RN

## 2021-02-20 PROBLEM — Z98.890 POST-OPERATIVE STATE: Status: RESOLVED | Noted: 2021-01-21 | Resolved: 2021-02-20

## 2022-03-07 ENCOUNTER — HOSPITAL ENCOUNTER (EMERGENCY)
Age: 77
Discharge: HOME | DRG: 244 | End: 2022-03-08
Attending: STUDENT IN AN ORGANIZED HEALTH CARE EDUCATION/TRAINING PROGRAM
Payer: MEDICARE

## 2022-03-07 DIAGNOSIS — D64.9 ACUTE ON CHRONIC ANEMIA: Primary | ICD-10-CM

## 2022-03-07 LAB
ABO/RH: NORMAL
ALBUMIN SERPL-MCNC: 3.4 G/DL (ref 3.5–5.2)
ALP BLD-CCNC: 43 U/L (ref 35–104)
ALT SERPL-CCNC: 9 U/L (ref 0–32)
ANION GAP SERPL CALCULATED.3IONS-SCNC: 10 MMOL/L (ref 7–16)
ANISOCYTOSIS: ABNORMAL
ANTIBODY SCREEN: NORMAL
AST SERPL-CCNC: 19 U/L (ref 0–31)
BASOPHILS ABSOLUTE: 0.05 E9/L (ref 0–0.2)
BASOPHILS RELATIVE PERCENT: 0.9 % (ref 0–2)
BILIRUB SERPL-MCNC: 0.2 MG/DL (ref 0–1.2)
BLOOD BANK DISPENSE STATUS: NORMAL
BLOOD BANK PRODUCT CODE: NORMAL
BPU ID: NORMAL
BUN BLDV-MCNC: 34 MG/DL (ref 6–23)
CALCIUM SERPL-MCNC: 8.9 MG/DL (ref 8.6–10.2)
CHLORIDE BLD-SCNC: 104 MMOL/L (ref 98–107)
CO2: 24 MMOL/L (ref 22–29)
CREAT SERPL-MCNC: 2.9 MG/DL (ref 0.5–1)
DESCRIPTION BLOOD BANK: NORMAL
EOSINOPHILS ABSOLUTE: 0.14 E9/L (ref 0.05–0.5)
EOSINOPHILS RELATIVE PERCENT: 2.6 % (ref 0–6)
GFR AFRICAN AMERICAN: 19
GFR NON-AFRICAN AMERICAN: 16 ML/MIN/1.73
GLUCOSE BLD-MCNC: 122 MG/DL (ref 74–99)
HCT VFR BLD CALC: 23.2 % (ref 34–48)
HEMOGLOBIN: 6.9 G/DL (ref 11.5–15.5)
LYMPHOCYTES ABSOLUTE: 0.48 E9/L (ref 1.5–4)
LYMPHOCYTES RELATIVE PERCENT: 8.7 % (ref 20–42)
MCH RBC QN AUTO: 27.6 PG (ref 26–35)
MCHC RBC AUTO-ENTMCNC: 29.7 % (ref 32–34.5)
MCV RBC AUTO: 92.8 FL (ref 80–99.9)
MONOCYTES ABSOLUTE: 0.26 E9/L (ref 0.1–0.95)
MONOCYTES RELATIVE PERCENT: 5.2 % (ref 2–12)
NEUTROPHILS ABSOLUTE: 4.4 E9/L (ref 1.8–7.3)
NEUTROPHILS RELATIVE PERCENT: 82.6 % (ref 43–80)
NUCLEATED RED BLOOD CELLS: 0.9 /100 WBC
OVALOCYTES: ABNORMAL
PDW BLD-RTO: 19.6 FL (ref 11.5–15)
PLATELET # BLD: 192 E9/L (ref 130–450)
PMV BLD AUTO: 9.7 FL (ref 7–12)
POIKILOCYTES: ABNORMAL
POLYCHROMASIA: ABNORMAL
POTASSIUM REFLEX MAGNESIUM: 4.3 MMOL/L (ref 3.5–5)
RBC # BLD: 2.5 E12/L (ref 3.5–5.5)
SODIUM BLD-SCNC: 138 MMOL/L (ref 132–146)
TOTAL PROTEIN: 5.6 G/DL (ref 6.4–8.3)
WBC # BLD: 5.3 E9/L (ref 4.5–11.5)

## 2022-03-07 PROCEDURE — 86900 BLOOD TYPING SEROLOGIC ABO: CPT

## 2022-03-07 PROCEDURE — 85025 COMPLETE CBC W/AUTO DIFF WBC: CPT

## 2022-03-07 PROCEDURE — 86923 COMPATIBILITY TEST ELECTRIC: CPT

## 2022-03-07 PROCEDURE — P9016 RBC LEUKOCYTES REDUCED: HCPCS

## 2022-03-07 PROCEDURE — 36430 TRANSFUSION BLD/BLD COMPNT: CPT

## 2022-03-07 PROCEDURE — 99284 EMERGENCY DEPT VISIT MOD MDM: CPT

## 2022-03-07 PROCEDURE — 80053 COMPREHEN METABOLIC PANEL: CPT

## 2022-03-07 PROCEDURE — 6370000000 HC RX 637 (ALT 250 FOR IP): Performed by: STUDENT IN AN ORGANIZED HEALTH CARE EDUCATION/TRAINING PROGRAM

## 2022-03-07 PROCEDURE — 93005 ELECTROCARDIOGRAM TRACING: CPT | Performed by: PHYSICIAN ASSISTANT

## 2022-03-07 PROCEDURE — 86850 RBC ANTIBODY SCREEN: CPT

## 2022-03-07 PROCEDURE — 36415 COLL VENOUS BLD VENIPUNCTURE: CPT

## 2022-03-07 PROCEDURE — 86901 BLOOD TYPING SEROLOGIC RH(D): CPT

## 2022-03-07 RX ORDER — LIDOCAINE AND PRILOCAINE 25; 25 MG/G; MG/G
CREAM TOPICAL ONCE
Status: COMPLETED | OUTPATIENT
Start: 2022-03-07 | End: 2022-03-07

## 2022-03-07 RX ORDER — SODIUM CHLORIDE 9 MG/ML
INJECTION, SOLUTION INTRAVENOUS PRN
Status: DISCONTINUED | OUTPATIENT
Start: 2022-03-07 | End: 2022-03-08 | Stop reason: HOSPADM

## 2022-03-07 RX ADMIN — LIDOCAINE AND PRILOCAINE: 25; 25 CREAM TOPICAL at 17:03

## 2022-03-07 NOTE — ED NOTES
Department of Emergency Medicine  FIRST PROVIDER TRIAGE NOTE          3/7/22  1:30 PM EST    Date of Encounter: No admission date for patient encounter. MRN: [id number]      HPI: Moe Castillo is a 77 y.o. female who presents to the ED for Other (Transferred from [facility] for HGB 6.8 and HVT 22.4)  Patient reports that she was sent from [facility] due to hemoglobin of 6.8. Patient reports that she does have a pacemaker surgery tentatively scheduled for Thursday of this week. Patient reports that she is currently not on any blood thinners. Patient states that she is having dark tarry stools for approximately 2 days. Patient states that she is having diarrhea at this time and denies bright red blood    ROS: Negative for cp, abd pain, back pain, fever, cough, vomiting, headache or dizziness. PE: Gen Appearance/Constitutional: alert  CV: bradicardia  Pulm: CTA bilat  GI: soft and NT     Initial Plan of Care: All treatment areas with department are currently occupied. Plan to order/Initiate the following while awaiting opening in ED: labs, EKG and imaging studies. Labs:  No results found for this visit on 03/07/22. Imaging: All Radiology results interpreted by Radiologist unless otherwise noted.   No orders to display     ED Course      Medications   0.9 % sodium chloride infusion (has no administration in time range)        -------------------------  Patient brought to treatment area for further evaluation  Electronically signed by AMY Funez   DD: 3/7/22       David Atwood, [address]  03/07/22 [facility], [address]  03/07/22 5552

## 2022-03-07 NOTE — ED NOTES
Verified RBC transfusion order with Dr Lety Bea at this time. Infuse 1 unit at this time, type and cross for 2.       Karrie Santos RN  03/07/22 7537

## 2022-03-07 NOTE — ED PROVIDER NOTES
Department of Emergency Medicine   ED  Provider Note  Admit Date/RoomTime: 3/7/2022  4:16 PM  ED Room: 1818A18      History of Present Illness:  3/7/22, Time: 5:01 PM EST  Chief Complaint   Patient presents with    Other     Transferred from [facility] for HGB 6.8 and HVT 22.4     Michelle Vance is a 77 y.o. female presenting to the ED for  Weakness, dark stools and shortness of breath. Patient has a history of multiple AVMs and is status post ablation by Dr. Farhad Menon. She went to the [facility] where she follows for her chronic iron transfusions. She actually was noted to be anemic to 6.8. Denies any chest pain nausea or vomiting. Denies any abdominal pain with it. She chronically will intermittently have dark stools as well. She has been eating and drinking without difficulty. Of note, the patient does have a GI doctor that she follows with at the [facility] clinic. She last had an ablation 13 months ago and February 2021. Patient symptoms are moderate in severity. She is scheduled at the pacemaker this Friday for asymptomatic sinus bradycardia. Review of Systems:  Review of systems obtained and negative unless stated otherwise above in the HPI.    --------------------------------------------- PAST HISTORY ---------------------------------------------  Past Medical History:  has a past medical history of Anemia, Arthritis, Asthma, AVM (arteriovenous malformation), Chronic systolic (congestive) heart failure ([address] Colon polyps, Diabetes ([facility] [address] Environmental allergies, Full dentures, Gastric ulcer, GERD (gastroesophageal reflux disease), High cholesterol, History of blood transfusion, Hypertension, Osteopenia, Poor venous access, and Post-operative state. Past Surgical History:  has a past surgical history that includes Knee Arthrocentesis; Knee arthroscopy (Left, 1980's); Cholecystectomy, open (1980's early);  Tubal ligation; Endoscopy, colon, diagnostic; Upper gastrointestinal endoscopy (04/18/2014); Appendectomy; other surgical history (10/13/2014); Cardiac catheterization (11/11/2020); Coronary angioplasty with stent (11/11/2020); Upper gastrointestinal endoscopy (N/A, 12/04/2020); Colonoscopy (N/A, 12/05/2020); and Port Surgery (N/A, 1/13/2021). Social History:  reports that she has never smoked. She has never used smokeless tobacco. She reports current alcohol use. She reports that she does not use drugs. Family History: family history includes Cancer in her father; Heart Disease in her father; Stroke in her mother. . Unless otherwise noted, family history is non contributory    The patients home medications have been reviewed. Allergies: Aspirin, Nsaids, and Statins    I have reviewed the past medical history, past surgical history, social history, and family history    ---------------------------------------------------PHYSICAL EXAM--------------------------------------    Constitutional: Appears in no distress  Head: Normocephalic, atraumatic  Eyes: Non-icteric slcera, no conjunctival injection  ENT: Moist mucous membranes,, pale conjunctive a  Neck: Trachea midline, no JVD  Respiratory: Nonlabored respirations. Lungs clear to auscultation bilaterally, no wheezes, rales, or rhonchi. Cardiovascular: Regular rate. Regular rhythm. No murmurs, no gallops, no rubs. Gastrointestinal: Abdomen Soft, Non tender, Non distended. No rebound tenderness, guarding, or rigidity. Extremities: No lower extremity edema  Genitourinary: No CVA tenderness, no suprapubic tenderness  Musculoskeletal: Moves all extremities, no deformity  Skin: Pink, warm, dry without rash. Neurologic: Alert, symmetric facies, no aphasia    -------------------------------------------------- RESULTS -------------------------------------------------  I have personally reviewed all laboratory and imaging results for this patient. Results are listed below.      LABS: (Lab results interpreted by me)  Results for orders placed or performed during the hospital encounter of 03/07/22   CBC with Auto Differential   Result Value Ref Range    WBC 5.3 4.5 - 11.5 E9/L    RBC 2.50 (L) 3.50 - 5.50 E12/L    Hemoglobin 6.9 (LL) 11.5 - 15.5 g/dL    Hematocrit 23.2 (L) 34.0 - 48.0 %    MCV 92.8 80.0 - 99.9 fL    MCH 27.6 26.0 - 35.0 pg    MCHC 29.7 (L) 32.0 - 34.5 %    RDW 19.6 (H) 11.5 - 15.0 fL    Platelets 332 210 - 107 E9/L    MPV 9.7 7.0 - 12.0 fL    Neutrophils % 82.6 (H) 43.0 - 80.0 %    Lymphocytes % 8.7 (L) 20.0 - 42.0 %    Monocytes % 5.2 2.0 - 12.0 %    Eosinophils % 2.6 0.0 - 6.0 %    Basophils % 0.9 0.0 - 2.0 %    Neutrophils Absolute 4.40 1.80 - 7.30 E9/L    Lymphocytes Absolute 0.48 (L) 1.50 - 4.00 E9/L    Monocytes Absolute 0.26 0.10 - 0.95 E9/L    Eosinophils Absolute 0.14 0.05 - 0.50 E9/L    Basophils Absolute 0.05 0.00 - 0.20 E9/L    nRBC 0.9 /100 WBC    Anisocytosis 2+     Polychromasia 1+     Poikilocytes 1+     Ovalocytes 1+    Comprehensive Metabolic Panel w/ Reflex to MG   Result Value Ref Range    Sodium 138 132 - 146 mmol/L    Potassium reflex Magnesium 4.3 3.5 - 5.0 mmol/L    Chloride 104 98 - 107 mmol/L    CO2 24 22 - 29 mmol/L    Anion Gap 10 7 - 16 mmol/L    Glucose 122 (H) 74 - 99 mg/dL    BUN 34 (H) 6 - 23 mg/dL    CREATININE 2.9 (H) 0.5 - 1.0 mg/dL    GFR Non-African American 16 >=60 mL/min/1.73    GFR African American 19     Calcium 8.9 8.6 - 10.2 mg/dL    Total Protein 5.6 (L) 6.4 - 8.3 g/dL    Albumin 3.4 (L) 3.5 - 5.2 g/dL    Total Bilirubin 0.2 0.0 - 1.2 mg/dL    Alkaline Phosphatase 43 35 - 104 U/L    ALT 9 0 - 32 U/L    AST 19 0 - 31 U/L   EKG 12 Lead   Result Value Ref Range    Ventricular Rate 46 BPM    Atrial Rate 46 BPM    P-R Interval 220 ms    QRS Duration 100 ms    Q-T Interval 518 ms    QTc Calculation (Bazett) 453 ms    P Axis 63 degrees    R Axis -10 degrees    T Axis 94 degrees   TYPE AND SCREEN   Result Value Ref Range    ABO/Rh O POS     Antibody Screen NEG    PREPARE RBC (CROSSMATCH), 1 Units Result Value Ref Range    Product Code Blood Bank [id number]     Description Blood Bank Red Blood Cells, Apheresis, Leuko-reduced     Unit Number [id number]     Dispense Status Blood Bank selected    ,       RADIOLOGY:  Interpreted by Radiologist unless otherwise specified  No orders to display         ------------------------- NURSING NOTES AND VITALS REVIEWED ---------------------------   The nursing notes within the ED encounter and vital signs as below have been reviewed by myself  BP (!) 169/41   Pulse (!) 48   Temp 98.7 °F (37.1 °C)   Resp 14   Ht 5' 3\" (1.6 m)   Wt 125 lb (56.7 kg)   SpO2 98%   BMI 22.14 kg/m²     Oxygen Saturation Interpretation: Normal    The patients available past medical records and past encounters were reviewed. ------------------------------ ED COURSE/MEDICAL DECISION MAKING----------------------  Medications   0.9 % sodium chloride infusion (has no administration in time range)   0.9 % sodium chloride infusion (has no administration in time range)   lidocaine-prilocaine (EMLA) cream ( Topical Given 3/7/22 1703)        Re-Evaluations: This patient's ED course included:a personal history and physicial examination, re-evaluation prior to disposition, multiple bedside re-evaluations and IV medications    This patient has remained hemodynamically stable during their ED course. Consultations:  None    Medical Decision Making:   Patient presents emergency department with concerns for low hemoglobin. Hemoglobin was 6.9 here. Vital signs interpreted myself as bradycardic and mildly hypertensive otherwise normal.    History and physical examination findings consistent with chronic GI bleeding. 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 At present, the patient denies any complaints aside from weakness and shortness of breath with exertion.     EKG:  EKG is interpreted myself as the rate of 46 beats minute there is a period before because Compass castration is over the QT/QTc is prolonged. No ST segment elevation or depression. No T wave inversions but there is a biphasic T wave in V5.*Interpreted by myself as first-degree AV block sinus bradycardia no evidence of ischemia or infarct at this time. Labs and imaging reviewed. Patient is anemic to 6.9. She has had chronic anemia with chronic black stools intermittently for the past several years at least.  She has had multiple scopes and ablations. Patient states she is feeling comfortable at this time. She has help at home including multiple children that can help her. She has not had any bloody stools in the emergency department. Her bleeding has not been brisk or bright red. She wishes to be transfused and discharged with outpatient follow-up. We did have extensive shared decision making on a group call with her family and decide that patient will be transfused 2 units packed red blood cells as her baseline hemoglobin is around 8.5 which she has been as low as 5.7. She has a physician that she has well-established care with at the [facility] and will follow up tomorrow morning peer    Critical Care:  Upon my evaluation, this patient had a high probability of imminent or life-threatening deterioration due to anemia requiring blood transfusion, which required my direct attention, intervention, and personal management. I have personally provided 35 minutes of critical care time excluding time spent on separately billable procedures. Time includes review of laboratory data, radiology results, discussion with consultants, and monitoring for potential decompensation. Interventions were performed as documented above. Counseling: The emergency provider has spoken with the patient and discussed todays results, in addition to providing specific details for the plan of care and counseling regarding the diagnosis and prognosis.   Questions are answered at this time and they are agreeable with the plan.       --------------------------------- IMPRESSION AND DISPOSITION ---------------------------------    IMPRESSION  1. Acute on chronic anemia        DISPOSITION  Disposition: Discharge to home  Patient condition is stable    Dr. July ROMAN am the primary provider of record    July Muro DO  Emergency Medicine    NOTE: This report was transcribed using voice recognition software.  Every effort was made to ensure accuracy; however, inadvertent computerized transcription errors may be present         Noemi Molina DO  03/07/22 2842

## 2022-03-07 NOTE — ED NOTES
This RN attempted to access power port. Pt moved upon needle insertion. unsuccessful access at this time. Pt requested EMLA cream. Dr Geoffry Cheadle notified. Order obtained for EMLA cream. Pt updated.      Vamsisusana Ward, RN  03/07/22 8711

## 2022-03-07 NOTE — ED NOTES
EMLA cream applied. Pt states she waits 30mins prior to accessing port.       Jerzy Ballard, RN  03/07/22 1294

## 2022-03-08 VITALS
DIASTOLIC BLOOD PRESSURE: 51 MMHG | RESPIRATION RATE: 16 BRPM | WEIGHT: 125 LBS | HEART RATE: 52 BPM | TEMPERATURE: 97.1 F | BODY MASS INDEX: 22.15 KG/M2 | SYSTOLIC BLOOD PRESSURE: 118 MMHG | OXYGEN SATURATION: 97 % | HEIGHT: 63 IN

## 2022-03-09 ENCOUNTER — TELEPHONE (OUTPATIENT)
Dept: CARDIAC CATH/INVASIVE PROCEDURES | Age: 77
End: 2022-03-09

## 2022-03-09 NOTE — TELEPHONE ENCOUNTER
Reminded patient of scheduled procedure on 3/10 . Instructions given and COVID questionnaire completed.

## 2022-03-10 ENCOUNTER — ANESTHESIA (OUTPATIENT)
Dept: CARDIAC CATH/INVASIVE PROCEDURES | Age: 77
End: 2022-03-10

## 2022-03-10 ENCOUNTER — HOSPITAL ENCOUNTER (INPATIENT)
Dept: CARDIAC CATH/INVASIVE PROCEDURES | Age: 77
LOS: 1 days | Discharge: HOME | DRG: 244 | End: 2022-03-11
Attending: INTERNAL MEDICINE | Admitting: INTERNAL MEDICINE
Payer: MEDICARE

## 2022-03-10 ENCOUNTER — ANESTHESIA EVENT (OUTPATIENT)
Dept: CARDIAC CATH/INVASIVE PROCEDURES | Age: 77
End: 2022-03-10

## 2022-03-10 ENCOUNTER — HOSPITAL ENCOUNTER (OUTPATIENT)
Dept: GENERAL RADIOLOGY | Age: 77
Discharge: HOME | DRG: 244 | End: 2022-03-12
Attending: INTERNAL MEDICINE
Payer: MEDICARE

## 2022-03-10 VITALS
SYSTOLIC BLOOD PRESSURE: 154 MMHG | OXYGEN SATURATION: 98 % | RESPIRATION RATE: 17 BRPM | DIASTOLIC BLOOD PRESSURE: 64 MMHG

## 2022-03-10 DIAGNOSIS — Z95.0 S/P PLACEMENT OF CARDIAC PACEMAKER: ICD-10-CM

## 2022-03-10 LAB
EKG ATRIAL RATE: 46 BPM
EKG P AXIS: 63 DEGREES
EKG P-R INTERVAL: 220 MS
EKG Q-T INTERVAL: 518 MS
EKG QRS DURATION: 100 MS
EKG QTC CALCULATION (BAZETT): 453 MS
EKG R AXIS: -10 DEGREES
EKG T AXIS: 94 DEGREES
EKG VENTRICULAR RATE: 46 BPM
METER GLUCOSE: 182 MG/DL (ref 74–99)
METER GLUCOSE: 200 MG/DL (ref 74–99)

## 2022-03-10 PROCEDURE — 3700000000 HC ANESTHESIA ATTENDED CARE

## 2022-03-10 PROCEDURE — C1894 INTRO/SHEATH, NON-LASER: HCPCS

## 2022-03-10 PROCEDURE — 2580000003 HC RX 258: Performed by: INTERNAL MEDICINE

## 2022-03-10 PROCEDURE — 2580000003 HC RX 258

## 2022-03-10 PROCEDURE — 02H63JZ INSERTION OF PACEMAKER LEAD INTO RIGHT ATRIUM, PERCUTANEOUS APPROACH: ICD-10-PCS | Performed by: INTERNAL MEDICINE

## 2022-03-10 PROCEDURE — 2709999900 HC NON-CHARGEABLE SUPPLY

## 2022-03-10 PROCEDURE — 33208 INSRT HEART PM ATRIAL & VENT: CPT

## 2022-03-10 PROCEDURE — 2500000003 HC RX 250 WO HCPCS

## 2022-03-10 PROCEDURE — C1898 LEAD, PMKR, OTHER THAN TRANS: HCPCS

## 2022-03-10 PROCEDURE — 0JH606Z INSERTION OF PACEMAKER, DUAL CHAMBER INTO CHEST SUBCUTANEOUS TISSUE AND FASCIA, OPEN APPROACH: ICD-10-PCS | Performed by: INTERNAL MEDICINE

## 2022-03-10 PROCEDURE — 6360000002 HC RX W HCPCS: Performed by: NURSE ANESTHETIST, CERTIFIED REGISTERED

## 2022-03-10 PROCEDURE — 2140000000 HC CCU INTERMEDIATE R&B

## 2022-03-10 PROCEDURE — 02HK3JZ INSERTION OF PACEMAKER LEAD INTO RIGHT VENTRICLE, PERCUTANEOUS APPROACH: ICD-10-PCS | Performed by: INTERNAL MEDICINE

## 2022-03-10 PROCEDURE — 6360000002 HC RX W HCPCS

## 2022-03-10 PROCEDURE — 3700000001 HC ADD 15 MINUTES (ANESTHESIA)

## 2022-03-10 PROCEDURE — C1785 PMKR, DUAL, RATE-RESP: HCPCS

## 2022-03-10 PROCEDURE — 6370000000 HC RX 637 (ALT 250 FOR IP): Performed by: FAMILY MEDICINE

## 2022-03-10 PROCEDURE — 71045 X-RAY EXAM CHEST 1 VIEW: CPT

## 2022-03-10 PROCEDURE — 6370000000 HC RX 637 (ALT 250 FOR IP): Performed by: INTERNAL MEDICINE

## 2022-03-10 PROCEDURE — 82962 GLUCOSE BLOOD TEST: CPT

## 2022-03-10 RX ORDER — MONTELUKAST SODIUM 10 MG/1
10 TABLET ORAL NIGHTLY
Status: DISCONTINUED | OUTPATIENT
Start: 2022-03-10 | End: 2022-03-11 | Stop reason: HOSPADM

## 2022-03-10 RX ORDER — PROPOFOL 10 MG/ML
INJECTION, EMULSION INTRAVENOUS PRN
Status: DISCONTINUED | OUTPATIENT
Start: 2022-03-10 | End: 2022-03-10 | Stop reason: SDUPTHER

## 2022-03-10 RX ORDER — HYDRALAZINE HYDROCHLORIDE 50 MG/1
50 TABLET, FILM COATED ORAL EVERY 12 HOURS SCHEDULED
Status: DISCONTINUED | OUTPATIENT
Start: 2022-03-10 | End: 2022-03-10

## 2022-03-10 RX ORDER — CEFAZOLIN SODIUM 1 G/3ML
INJECTION, POWDER, FOR SOLUTION INTRAMUSCULAR; INTRAVENOUS PRN
Status: DISCONTINUED | OUTPATIENT
Start: 2022-03-10 | End: 2022-03-10 | Stop reason: SDUPTHER

## 2022-03-10 RX ORDER — ISOSORBIDE DINITRATE 10 MG/1
20 TABLET ORAL 2 TIMES DAILY
Status: DISCONTINUED | OUTPATIENT
Start: 2022-03-10 | End: 2022-03-11 | Stop reason: HOSPADM

## 2022-03-10 RX ORDER — DEXTROSE MONOHYDRATE 25 G/50ML
12.5 INJECTION, SOLUTION INTRAVENOUS PRN
Status: DISCONTINUED | OUTPATIENT
Start: 2022-03-10 | End: 2022-03-11 | Stop reason: HOSPADM

## 2022-03-10 RX ORDER — SODIUM CHLORIDE 0.9 % (FLUSH) 0.9 %
5-40 SYRINGE (ML) INJECTION EVERY 12 HOURS SCHEDULED
Status: DISCONTINUED | OUTPATIENT
Start: 2022-03-10 | End: 2022-03-11 | Stop reason: HOSPADM

## 2022-03-10 RX ORDER — NICOTINE POLACRILEX 4 MG
15 LOZENGE BUCCAL PRN
Status: DISCONTINUED | OUTPATIENT
Start: 2022-03-10 | End: 2022-03-11 | Stop reason: HOSPADM

## 2022-03-10 RX ORDER — SODIUM CHLORIDE 0.9 % (FLUSH) 0.9 %
5-40 SYRINGE (ML) INJECTION PRN
Status: DISCONTINUED | OUTPATIENT
Start: 2022-03-10 | End: 2022-03-11 | Stop reason: HOSPADM

## 2022-03-10 RX ORDER — SODIUM CHLORIDE 9 MG/ML
INJECTION, SOLUTION INTRAVENOUS CONTINUOUS
Status: DISCONTINUED | OUTPATIENT
Start: 2022-03-10 | End: 2022-03-11 | Stop reason: HOSPADM

## 2022-03-10 RX ORDER — GLIPIZIDE 5 MG/1
2.5 TABLET ORAL
Status: DISCONTINUED | OUTPATIENT
Start: 2022-03-11 | End: 2022-03-11 | Stop reason: HOSPADM

## 2022-03-10 RX ORDER — EZETIMIBE 10 MG/1
10 TABLET ORAL NIGHTLY
Status: DISCONTINUED | OUTPATIENT
Start: 2022-03-10 | End: 2022-03-11 | Stop reason: HOSPADM

## 2022-03-10 RX ORDER — SODIUM CHLORIDE 0.9 % (FLUSH) 0.9 %
5-40 SYRINGE (ML) INJECTION EVERY 12 HOURS SCHEDULED
Status: DISCONTINUED | OUTPATIENT
Start: 2022-03-10 | End: 2022-03-10 | Stop reason: SDUPTHER

## 2022-03-10 RX ORDER — ONDANSETRON 2 MG/ML
4 INJECTION INTRAMUSCULAR; INTRAVENOUS EVERY 6 HOURS PRN
Status: DISCONTINUED | OUTPATIENT
Start: 2022-03-10 | End: 2022-03-11 | Stop reason: HOSPADM

## 2022-03-10 RX ORDER — HYDRALAZINE HYDROCHLORIDE 50 MG/1
50 TABLET, FILM COATED ORAL EVERY 12 HOURS SCHEDULED
Status: DISCONTINUED | OUTPATIENT
Start: 2022-03-11 | End: 2022-03-11 | Stop reason: HOSPADM

## 2022-03-10 RX ORDER — ONDANSETRON 2 MG/ML
4 INJECTION INTRAMUSCULAR; INTRAVENOUS EVERY 6 HOURS PRN
Status: CANCELLED | OUTPATIENT
Start: 2022-03-10

## 2022-03-10 RX ORDER — HYDRALAZINE HYDROCHLORIDE 25 MG/1
25 TABLET, FILM COATED ORAL EVERY 12 HOURS SCHEDULED
Status: DISCONTINUED | OUTPATIENT
Start: 2022-03-10 | End: 2022-03-10

## 2022-03-10 RX ORDER — SODIUM CHLORIDE 9 MG/ML
25 INJECTION, SOLUTION INTRAVENOUS PRN
Status: DISCONTINUED | OUTPATIENT
Start: 2022-03-10 | End: 2022-03-10 | Stop reason: SDUPTHER

## 2022-03-10 RX ORDER — MIDAZOLAM HYDROCHLORIDE 1 MG/ML
INJECTION INTRAMUSCULAR; INTRAVENOUS PRN
Status: DISCONTINUED | OUTPATIENT
Start: 2022-03-10 | End: 2022-03-10 | Stop reason: SDUPTHER

## 2022-03-10 RX ORDER — HYDRALAZINE HYDROCHLORIDE 25 MG/1
25 TABLET, FILM COATED ORAL ONCE
Status: COMPLETED | OUTPATIENT
Start: 2022-03-10 | End: 2022-03-10

## 2022-03-10 RX ORDER — FENTANYL CITRATE 50 UG/ML
INJECTION, SOLUTION INTRAMUSCULAR; INTRAVENOUS PRN
Status: DISCONTINUED | OUTPATIENT
Start: 2022-03-10 | End: 2022-03-10 | Stop reason: SDUPTHER

## 2022-03-10 RX ORDER — SODIUM CHLORIDE 9 MG/ML
25 INJECTION, SOLUTION INTRAVENOUS PRN
Status: DISCONTINUED | OUTPATIENT
Start: 2022-03-10 | End: 2022-03-11 | Stop reason: HOSPADM

## 2022-03-10 RX ORDER — SODIUM CHLORIDE 0.9 % (FLUSH) 0.9 %
5-40 SYRINGE (ML) INJECTION PRN
Status: DISCONTINUED | OUTPATIENT
Start: 2022-03-10 | End: 2022-03-10 | Stop reason: SDUPTHER

## 2022-03-10 RX ORDER — DEXTROSE MONOHYDRATE 50 MG/ML
100 INJECTION, SOLUTION INTRAVENOUS PRN
Status: DISCONTINUED | OUTPATIENT
Start: 2022-03-10 | End: 2022-03-11 | Stop reason: HOSPADM

## 2022-03-10 RX ORDER — HYDRALAZINE HYDROCHLORIDE 50 MG/1
50 TABLET, FILM COATED ORAL EVERY 12 HOURS SCHEDULED
Status: DISCONTINUED | OUTPATIENT
Start: 2022-03-11 | End: 2022-03-10

## 2022-03-10 RX ADMIN — ISOSORBIDE DINITRATE 20 MG: 10 TABLET ORAL at 20:39

## 2022-03-10 RX ADMIN — FENTANYL CITRATE 25 MCG: 50 INJECTION, SOLUTION INTRAMUSCULAR; INTRAVENOUS at 13:03

## 2022-03-10 RX ADMIN — HYDRALAZINE HYDROCHLORIDE 25 MG: 25 TABLET, FILM COATED ORAL at 20:39

## 2022-03-10 RX ADMIN — EZETIMIBE 10 MG: 10 TABLET ORAL at 20:39

## 2022-03-10 RX ADMIN — FENTANYL CITRATE 25 MCG: 50 INJECTION, SOLUTION INTRAMUSCULAR; INTRAVENOUS at 12:52

## 2022-03-10 RX ADMIN — FENTANYL CITRATE 25 MCG: 50 INJECTION, SOLUTION INTRAMUSCULAR; INTRAVENOUS at 12:42

## 2022-03-10 RX ADMIN — MIDAZOLAM 1 MG: 1 INJECTION INTRAMUSCULAR; INTRAVENOUS at 12:27

## 2022-03-10 RX ADMIN — HYDRALAZINE HYDROCHLORIDE 25 MG: 25 TABLET, FILM COATED ORAL at 21:00

## 2022-03-10 RX ADMIN — FENTANYL CITRATE 25 MCG: 50 INJECTION, SOLUTION INTRAMUSCULAR; INTRAVENOUS at 12:44

## 2022-03-10 RX ADMIN — SODIUM CHLORIDE, PRESERVATIVE FREE 10 ML: 5 INJECTION INTRAVENOUS at 20:39

## 2022-03-10 RX ADMIN — PROPOFOL 40 MG: 10 INJECTION, EMULSION INTRAVENOUS at 13:27

## 2022-03-10 RX ADMIN — MIDAZOLAM 1 MG: 1 INJECTION INTRAMUSCULAR; INTRAVENOUS at 12:32

## 2022-03-10 RX ADMIN — CEFAZOLIN 2000 MG: 1 INJECTION, POWDER, FOR SOLUTION INTRAMUSCULAR; INTRAVENOUS at 12:37

## 2022-03-10 RX ADMIN — SODIUM CHLORIDE: 9 INJECTION, SOLUTION INTRAVENOUS at 10:50

## 2022-03-10 RX ADMIN — MONTELUKAST 10 MG: 10 TABLET, FILM COATED ORAL at 20:38

## 2022-03-10 ASSESSMENT — LIFESTYLE VARIABLES: SMOKING_STATUS: 0

## 2022-03-10 NOTE — ANESTHESIA PRE PROCEDURE
Department of Anesthesiology  Preprocedure Note       Name:  Aida Rodrigez   Age:  77 y.o.  :  1945                                          MRN:  [id number]         Date:  3/10/2022      Surgeon: Renzo Ibrahim    Procedure:   IMPLANT PPM  Medications prior to admission:   Prior to Admission medications    Medication Sig Start Date End Date Taking? Authorizing Provider   glimepiride (AMARYL) 2 MG tablet Take 1 tablet by mouth every morning (before breakfast) 21  Yes Fabian Barlow MD   isosorbide dinitrate (ISORDIL) 10 MG tablet Take 10 mg by mouth 2 times daily   Yes Historical Provider, MD   hydrALAZINE (APRESOLINE) 25 MG tablet Take 1 tablet by mouth 2 times daily  Patient taking differently: Take 50 mg by mouth 2 times daily  20  Yes Fabian Barlow MD   ezetimibe (ZETIA) 10 MG tablet Take 1 tablet by mouth nightly 20  Yes Emeli Allen MD   empagliflozin (JARDIANCE) 25 MG tablet Take 25 mg by mouth daily   Yes Historical Provider, MD   raloxifene (EVISTA) 60 MG tablet Take 60 mg by mouth nightly   Yes Historical Provider, MD   magnesium oxide (MAG-OX) 400 MG tablet Take 400 mg by mouth daily. Yes Historical Provider, MD   Cholecalciferol (VITAMIN D-3) 25 MCG (1000 UT) CAPS Take 2,000 Units by mouth daily    Yes Historical Provider, MD   nitroGLYCERIN (NITROSTAT) 0.4 MG SL tablet up to max of 3 total doses. If no relief after 1 dose, call 911. 20   Emeli Allen MD   aspirin 81 MG EC tablet Take 1 tablet by mouth daily 20   Emeli Allen MD   montelukast (SINGULAIR) 10 MG tablet Take 10 mg by mouth nightly.     Historical Provider, MD       Current medications:    Current Outpatient Medications   Medication Sig Dispense Refill    glimepiride (AMARYL) 2 MG tablet Take 1 tablet by mouth every morning (before breakfast) 30 tablet 3    isosorbide dinitrate (ISORDIL) 10 MG tablet Take 10 mg by mouth 2 times daily      hydrALAZINE (APRESOLINE) 25 MG tablet Take 1 tablet by mouth 2 times daily (Patient taking differently: Take 50 mg by mouth 2 times daily ) 90 tablet 3    ezetimibe (ZETIA) 10 MG tablet Take 1 tablet by mouth nightly 30 tablet 3    empagliflozin (JARDIANCE) 25 MG tablet Take 25 mg by mouth daily      raloxifene (EVISTA) 60 MG tablet Take 60 mg by mouth nightly      magnesium oxide (MAG-OX) 400 MG tablet Take 400 mg by mouth daily.  Cholecalciferol (VITAMIN D-3) 25 MCG (1000 UT) CAPS Take 2,000 Units by mouth daily       nitroGLYCERIN (NITROSTAT) 0.4 MG SL tablet up to max of 3 total doses. If no relief after 1 dose, call 911. 25 tablet 3    aspirin 81 MG EC tablet Take 1 tablet by mouth daily 30 tablet 3    montelukast (SINGULAIR) 10 MG tablet Take 10 mg by mouth nightly. Current Facility-Administered Medications   Medication Dose Route Frequency Provider Last Rate Last Admin    0.9 % sodium chloride infusion   IntraVENous Continuous Melania Najera MD 20 mL/hr at 03/10/22 1050 New Bag at 03/10/22 1050       Allergies: Allergies   Allergen Reactions    Enalapril     Aspirin Hives     Patient takes 81mg aspirin daily, no reaction to medication.     Nsaids Rash    Statins Other (See Comments)     Muscle weakness       Problem List:    Patient Active Problem List   Diagnosis Code    Colon polyps K63.5    Essential hypertension I10    Type 2 diabetes mellitus ([facility] [address] E11.9    Mitral regurgitation I34.0    Stage 3b chronic kidney disease (HCC) N18.32    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    UGIB (upper gastrointestinal bleed) K92.2    Encounter for monitoring antiplatelet therapy M44.41, Z79.02    Ischemic cardiomyopathy I25.5    Chronic systolic (congestive) heart failure (HCC) I50.22    Moderate malnutrition (HCC) E44.0    Acute blood loss anemia D62    Anemia of chronic kidney failure N18.9, D63.1    Iron deficiency anemia due to chronic blood loss D50.0    Mobitz type 1 second degree atrioventricular block I44.1    Poor venous access I87.8    Anemia D64.9    Hypoglycemia E16.2       Past Medical History:        Diagnosis Date    Anemia     4/2014 put on iron supplement    Arthritis     Asthma     Bronchial asthma, getting better, going to Dr. Elgie Severance,  weaning off allergy shots and inhalers    AVM (arteriovenous malformation)     CAD (coronary artery disease)     Chronic systolic (congestive) heart failure ([facility] [address] 12/03/2020    Colon polyps 02/20/2014    Diabetes ([facility] [address]     Environmental allergies     spring/winter    Full dentures     Gastric ulcer 02/20/2014    GERD (gastroesophageal reflux disease)     High cholesterol     History of blood transfusion     Hypertension     124/70, has been good    Osteopenia     Poor venous access 1/12/2021    Post-operative state 1/21/2021       Past Surgical History:        Procedure Laterality Date    APPENDECTOMY      CARDIAC CATHETERIZATION  11/11/2020    Dr Larissa Culp, OPEN  1980's early    COLONOSCOPY N/A 12/05/2020    COLONOSCOPY POLYPECTOMY SNARE/COLD BIOPSY performed by Duane Elizondo MD at [place] [address]  11/11/2020    DR Fernanda Norris Resolute [id number]  3.0x 22prox    ENDOSCOPY, COLON, DIAGNOSTIC      KNEE ARTHROCENTESIS      KNEE ARTHROSCOPY Left 1980's    OTHER SURGICAL HISTORY  10/13/2014    antrogade balloon enteroscopy with biopsy of polyp and scleratherapy    PORT SURGERY N/A 1/13/2021    PORT INSERTION performed by Johnny Florian MD at [address] [place] [address],[address] ENDOSCOPY  04/18/2014    UPPER GASTROINTESTINAL ENDOSCOPY N/A 12/04/2020    EGD ESOPHAGOGASTRODUODENOSCOPY performed by Wing Shivani MD at [address] [place] [address] History:    Social History     Tobacco Use    Smoking status: Never Smoker    Smokeless tobacco: Never Used   Substance Use Topics    Alcohol use:  Yes     Alcohol/week: 0.0 standard drinks     Comment: socially Counseling given: Not Answered      Vital Signs (Current):   Vitals:    03/10/22 1015   BP: (!) 202/50   Pulse: (!) 47   Resp: 18   Temp: 98.5 °F (36.9 °C)   TempSrc: Tympanic   SpO2: 99%   Weight: 125 lb (56.7 kg)   Height: 5' 3\" (1.6 m)                                              BP Readings from Last 3 Encounters:   03/10/22 (!) 202/50   03/08/22 (!) 118/51   01/23/21 134/67       NPO Status:  >8 HRS                                                                               BMI:   Wt Readings from Last 3 Encounters:   03/10/22 125 lb (56.7 kg)   03/07/22 125 lb (56.7 kg)   01/22/21 115 lb (52.2 kg)     Body mass index is 22.14 kg/m². CBC:   Lab Results   Component Value Date    WBC 5.3 03/07/2022    RBC 2.50 03/07/2022    HGB 6.9 03/07/2022    HCT 23.2 03/07/2022    MCV 92.8 03/07/2022    RDW 19.6 03/07/2022     03/07/2022       CMP:   Lab Results   Component Value Date     03/07/2022    K 4.3 03/07/2022     03/07/2022    CO2 24 03/07/2022    BUN 34 03/07/2022    CREATININE 2.9 03/07/2022    GFRAA 19 03/07/2022    LABGLOM 16 03/07/2022    GLUCOSE 122 03/07/2022    PROT 5.6 03/07/2022    CALCIUM 8.9 03/07/2022    BILITOT 0.2 03/07/2022    ALKPHOS 43 03/07/2022    AST 19 03/07/2022    ALT 9 03/07/2022       POC Tests: No results for input(s): POCGLU, POCNA, POCK, POCCL, POCBUN, POCHEMO, POCHCT in the last 72 hours.     Coags:   Lab Results   Component Value Date    PROTIME 14.0 01/20/2021    INR 1.2 01/20/2021    APTT 29.9 01/20/2021       HCG (If Applicable): No results found for: PREGTESTUR, PREGSERUM, HCG, HCGQUANT     ABGs: No results found for: PHART, PO2ART, TKP0ACW, APH2EYT, BEART, K0KGESJM     Type & Screen (If Applicable):  No results found for: LABABO, LABRH    Drug/Infectious Status (If Applicable):  No results found for: HIV, HEPCAB    COVID-19 Screening (If Applicable):   Lab Results   Component Value Date    COVID19 Not Detected 01/08/2021 & T wave abnormality, consider lateral ischemia  Abnormal ECG  When compared with ECG of 02-DEC-2020 15:42,  Significant changes have occurred      Specimen Collected: 03/07/22 16:33 Last Resulted: 03/08/22 05:32                   Anesthesia Plan      MAC     ASA 4       Induction: intravenous. Anesthetic plan and risks discussed with patient. Use of blood products discussed with patient whom consented to blood products. Plan discussed with CRNA and attending.                 Berenice Patterson MD   3/10/2022

## 2022-03-10 NOTE — PROCEDURES
Patient previously informed of the risks, discomforts, benefits,  risks as of implantation of dual-chamber permanent pacemaker. Agreed in the office as well as today to proceed. Anesthesia sedated patient throughout the procedure. Pulse oximetry blood pressure and telemetry monitor. Left subclavicular region was prepped and draped in sterile fashion. Venogram was done from the left arm IV and showed widely patent left subclavian vein. Subcutaneous lidocaine was placed. Incision was made and by blunt dissection a pocket was fashioned. Left subclavian vein was accessed with a needle and a wire was passed into the right heart chambers confirmed to be in the right her chambers under fluoroscopy and that needle was removed. Second needle was placed in the left subclavian vein and a wire was passed into the right heart chambers confirmed to be in the right heart chambers under fluoroscopy and that needle was removed. Over this wire 7 Chinese sheath was placed and the wire was removed. Through the 7 Chinese sheath passive fixation Medtronic bipolar lead was passed into the right ventricular apex and excellent pacing and sensing parameters were noted 10 V delivered no diaphragmatic stimulation. The 7 Chinese sheath had been removed. Over the other wire an additional 7 Chinese sheath was placed in that wire was removed. Through the 7 Chinese sheath a Medtronic bipolar lead was passed into the right atrial appendage and that 7 Chinese sheath was removed removed. The lead was positioned and excellent pacing threshold were noted and was sutured in place using 2 ties of 0 silk. The atrial and ventricular leads were connected to the dual-chamber Medtronic permanent pacemaker. Pacemaker was placed in the pocket and the pocket was flushed with vancomycin solution. Subcutaneous tissue closed with 2 layers running Vicryl suture. Skin was closed with staples. Estimated blood loss 30 cc.   Patient stable at the end of the procedure. Conclusion: #1 new dual-chamber permanent pacemaker is a Medtronic model number W1 DR 01 serial number [id number]. Implanted today in the left subclavicular region. #2 the right atrial lead is a Medtronic bipolar lead model #71483 545 cm serial number [id number]. The measured P wave 2.8 mV impedance 475 ohms pacing threshold 1 V at 0.4 ms. #3 the right ventricular lead is a Medtronic passive fixation model #[id number] 52 cm, serial number [id number] in the right ventricular.

## 2022-03-10 NOTE — ANESTHESIA POSTPROCEDURE EVALUATION
Department of Anesthesiology  Postprocedure Note    Patient: Star Valencia  MRN: [id number]  YOB: 1945  Date of evaluation: 3/10/2022  Time:  2:59 PM     Procedure Summary     Date: 03/10/22 [place] [facility] [place]    Anesthesia Start: 1214 Anesthesia Stop: 8621    Procedure: PACEMAKER IMPLANT W/ANES Diagnosis:       Nonrheumatic mitral (valve) insufficiency      S/P placement of cardiac pacemaker    Scheduled Providers: CHELA Ballesteros - MADDY; Maya Ware MD Responsible Provider: Maya Ware MD    Anesthesia Type: MAC ASA Status: 4          Anesthesia Type: MAC    Emilia Phase I:      Emilia Phase II:      Last vitals: Reviewed and per EMR flowsheets.        Anesthesia Post Evaluation    Patient location during evaluation: PACU  Patient participation: complete - patient participated  Level of consciousness: awake  Pain score: 0  Airway patency: patent  Nausea & Vomiting: no nausea  Complications: no  Cardiovascular status: hemodynamically stable  Respiratory status: acceptable  Hydration status: stable

## 2022-03-11 VITALS
WEIGHT: 125 LBS | RESPIRATION RATE: 18 BRPM | BODY MASS INDEX: 22.15 KG/M2 | OXYGEN SATURATION: 99 % | DIASTOLIC BLOOD PRESSURE: 53 MMHG | HEART RATE: 68 BPM | TEMPERATURE: 98 F | HEIGHT: 63 IN | SYSTOLIC BLOOD PRESSURE: 175 MMHG

## 2022-03-11 LAB
HCT VFR BLD CALC: 28.4 % (ref 34–48)
HEMOGLOBIN: 8.5 G/DL (ref 11.5–15.5)
MCH RBC QN AUTO: 28.2 PG (ref 26–35)
MCHC RBC AUTO-ENTMCNC: 29.9 % (ref 32–34.5)
MCV RBC AUTO: 94.4 FL (ref 80–99.9)
METER GLUCOSE: 91 MG/DL (ref 74–99)
PDW BLD-RTO: 17.4 FL (ref 11.5–15)
PLATELET # BLD: 175 E9/L (ref 130–450)
PMV BLD AUTO: 9.7 FL (ref 7–12)
RBC # BLD: 3.01 E12/L (ref 3.5–5.5)
WBC # BLD: 4.8 E9/L (ref 4.5–11.5)

## 2022-03-11 PROCEDURE — 85027 COMPLETE CBC AUTOMATED: CPT

## 2022-03-11 PROCEDURE — 6370000000 HC RX 637 (ALT 250 FOR IP): Performed by: FAMILY MEDICINE

## 2022-03-11 PROCEDURE — 36415 COLL VENOUS BLD VENIPUNCTURE: CPT

## 2022-03-11 PROCEDURE — 82962 GLUCOSE BLOOD TEST: CPT

## 2022-03-11 PROCEDURE — 2580000003 HC RX 258: Performed by: INTERNAL MEDICINE

## 2022-03-11 PROCEDURE — 6370000000 HC RX 637 (ALT 250 FOR IP): Performed by: INTERNAL MEDICINE

## 2022-03-11 RX ORDER — MONTELUKAST SODIUM 10 MG/1
10 TABLET ORAL NIGHTLY
Status: DISCONTINUED | OUTPATIENT
Start: 2022-03-11 | End: 2022-03-11 | Stop reason: SDUPTHER

## 2022-03-11 RX ORDER — HYDRALAZINE HYDROCHLORIDE 50 MG/1
50 TABLET, FILM COATED ORAL EVERY 12 HOURS SCHEDULED
Qty: 90 TABLET | Refills: 3 | Status: SHIPPED | OUTPATIENT
Start: 2022-03-11 | End: 2022-03-11 | Stop reason: HOSPADM

## 2022-03-11 RX ORDER — RALOXIFENE HYDROCHLORIDE 60 MG/1
60 TABLET, FILM COATED ORAL NIGHTLY
Status: DISCONTINUED | OUTPATIENT
Start: 2022-03-11 | End: 2022-03-11 | Stop reason: CLARIF

## 2022-03-11 RX ORDER — MAGNESIUM OXIDE 400 MG/1
400 TABLET ORAL DAILY
Status: DISCONTINUED | OUTPATIENT
Start: 2022-03-11 | End: 2022-03-11 | Stop reason: HOSPADM

## 2022-03-11 RX ORDER — EZETIMIBE 10 MG/1
10 TABLET ORAL NIGHTLY
Status: DISCONTINUED | OUTPATIENT
Start: 2022-03-11 | End: 2022-03-11 | Stop reason: SDUPTHER

## 2022-03-11 RX ORDER — ISOSORBIDE DINITRATE 20 MG/1
20 TABLET ORAL 2 TIMES DAILY
Qty: 90 TABLET | Refills: 3 | Status: SHIPPED | OUTPATIENT
Start: 2022-03-11 | End: 2022-03-11 | Stop reason: HOSPADM

## 2022-03-11 RX ADMIN — HYDRALAZINE HYDROCHLORIDE 50 MG: 50 TABLET, FILM COATED ORAL at 07:46

## 2022-03-11 RX ADMIN — SODIUM CHLORIDE: 9 INJECTION, SOLUTION INTRAVENOUS at 06:34

## 2022-03-11 RX ADMIN — SODIUM CHLORIDE, PRESERVATIVE FREE 10 ML: 5 INJECTION INTRAVENOUS at 07:47

## 2022-03-11 RX ADMIN — GLIPIZIDE 2.5 MG: 5 TABLET ORAL at 06:02

## 2022-03-11 RX ADMIN — MAGNESIUM OXIDE 400 MG (241.3 MG MAGNESIUM) TABLET 400 MG: TABLET at 07:47

## 2022-03-11 RX ADMIN — ISOSORBIDE DINITRATE 20 MG: 10 TABLET ORAL at 07:46

## 2022-03-11 NOTE — PROGRESS NOTES
Pat Ambrose was ordered empagliflozin (Uday Rodriguez) which is a nonformulary medication. This medication will need to be supplied by the patient as the pharmacy does not carry this non-formulary medication. If the medication has not been administered by 1400 on the following day from the time the order was placed, a pharmacist will follow-up with the nurse of the patient to assess the capability of the patient to bring in the medication. If it is determined that the patient cannot supply the medication and it is not available to be dispensed from the pharmacy, the provider will be notified.

## 2022-03-11 NOTE — PROGRESS NOTES
Patient states her home dose of hydralazine is 50mg twice daily. Current order in chart is 25mg every twelve hours. Notified her family medicine doctor. Blood pressure was increased, doctor notified of BP as well. Gave order to change 25mg to 50mg every twelve hours. 25mg hydralazine was already given, pulled an additional one time dose of 25 mg hydralazine to equal 50mg as ordered.

## 2022-03-11 NOTE — PROGRESS NOTES
[facility] CARDIOLOGY DISCHARGE/PROGRESS NOTE  [facility]        Subjective:  Underwent placement of new Medtronic dual-chamber permanent pacemaker left subclavicular region for 2-1 AV block, second-degree type II, has known chronic CAD with 2 stents to the circumflex previously placed November 2020, chronic hypertension, diabetes and anemia. Chest x-ray done yesterday after pacemaker implant no pneumothorax. Leads in appropriate position. No shortness of breath or distress currently or overnight. Small hematoma at the pacemaker    Site. Incision is intact. The pacer check this morning shows appropriate pacing and sensing. Appropriate parameters. Objective: Medications lab chart and telemetry all reviewed. Patient Vitals for the past 24 hrs:   BP Temp Temp src Pulse Resp SpO2 Height Weight   03/11/22 0730 (!) 175/53 98 °F (36.7 °C) Oral 68 18 99 %     03/11/22 0424 (!) 152/68 98 °F (36.7 °C) Oral 70 18 98 %     03/11/22 0000 (!) 146/64 98.8 °F (37.1 °C) Oral 72 18 98 %     03/10/22 2039 (!) 178/68 98.9 °F (37.2 °C) Oral 72  99 %     03/10/22 1600 (!) 162/54 98.4 °F (36.9 °C) Oral 74 18 100 %     03/10/22 1015 (!) 202/50 98.5 °F (36.9 °C) Tympanic (!) 47 18 99 % 5' 3\" (1.6 m) 125 lb (56.7 kg)         Intake/Output Summary (Last 24 hours) at 3/11/2022 0947  Last data filed at 3/11/2022 0730  Gross per 24 hour   Intake 790 ml   Output    Net 790 ml       Wt Readings from Last 3 Encounters:   03/10/22 125 lb (56.7 kg)   03/07/22 125 lb (56.7 kg)   01/22/21 115 lb (52.2 kg)       Telemetry: Atrial sensed and ventricular demand pacing in the 70s.     Current meds: Scheduled Meds:   empagliflozin  25 mg Oral Daily    magnesium oxide  400 mg Oral Daily    sodium chloride flush  5-40 mL IntraVENous 2 times per day    glipiZIDE  2.5 mg Oral QAM AC    isosorbide dinitrate  20 mg Oral BID    ezetimibe  10 mg Oral Nightly    montelukast  10 mg Oral Nightly    hydrALAZINE  50 mg Oral 2 times per day     Continuous Infusions:   sodium chloride 20 mL/hr at 03/11/22 0634    dextrose      sodium chloride       PRN Meds:.ondansetron, glucose, dextrose, glucagon (rDNA), dextrose, sodium chloride flush, sodium chloride    Allergies: Enalapril, Aspirin, Nsaids, and Statins      Labs:   Recent Labs     03/11/22  0650   WBC 4.8   HGB 8.5*   HCT 28.4*   MCV 94.4          Labs: No results for input(s): NA, K, CL, CO2, PHOS, BUN, CREATININE, CA in the last 72 hours. Labs: No results for input(s): CKTOTAL, CKMB, CKMBINDEX, TROPONINI in the last 72 hours. Labs: No results for input(s): BNP in the last 72 hours. Labs: No results for input(s): CHOL, HDL, TRIG in the last 72 hours. Invalid input(s): CHOLHDLR, LDLCALCU    Labs: No results for input(s): PROT, INR in the last 72 hours. Review of systems: No reported significant weight gain or weight loss. no dysuria or frequency, no dizziness, falls or trauma, no change in bowel or bladder habits, no hematochezia, hemoptysis or hematuria. No fevers, chills, nausea or vomiting reported. No significant wheezing or sputum production. No headache or visual changes. The remainder of the 10 review of systems otherwise negative. Exam      General: Patient comfortable in no distress and currently denies any chest pain. HEENT: Face symmetrical and no apparent cranial nerve deficit. Neck: No jugular venous distention, carotid bruit or thyromegaly. Lungs: Clear bilaterally without rales, wheezes or dullness. Cardiac: Regular rate and rhythm, no S3, S4, no rub or gallop. Abdomen: No rebound or guarding, no hepatosplenomegaly. Extremities: Without significant clubbing , cyanosis, or edema. Neuro:  No focal motor or sensory deficit apparent. Skin: No petechiae, no significant bruising.       Assessment: See plan below        Plan: #1 second-degree type II AV block and dual-chamber permanent pacemaker implanted yesterday. Appropriate sensing and pacing this morning. Chest x-ray yesterday noon pneumothorax. She can be discharged to home today from cardiology viewpoint and be seen back [facility] [place] 10 to 14 days. #2 chronic CAD 2 stents to the circumflex November 2020 and will plan on no aspirin for 3 days time. Resume other medications. #3 diabetes follow-up blood sugar. Diabetic education reviewed and provided. #4 hypertension blood pressure reasonable on current medications. Continue to modify cardiovascular risk factors. #5 hyperlipidemia continue on Zetia and reminded to follow low-sodium low-fat low carbohydrate diet. She will continue on her current medical regimen as outlined above and also resume her medication she was on at home as previously but hold aspirin for 3 more days. To be seen back at [facility] cardiology 10 to 14 days. Wound instructions provided. Discharged to home in stable condition. Wound care instructions also reviewed.             Electronically signed by Homer Eden MD on 3/11/2022 at 9:47 AM

## 2022-03-11 NOTE — PROGRESS NOTES
size is upper normal.  Pulmonary vascularity is normal.  Lungs are clear. Neither costophrenic angle is blunted. New left-sided pacemaker. No pneumothorax.        Awake, alert  Calm, oriented  No jvd  Heart distant, paced  Lungs ctab  No edema  Assessment//Plan           Hospital Problems           Last Modified POA    * (Principal) S/P placement of cardiac pacemaker 3/10/2022 Yes        Assessment & Plan  Second degree mobitz 2 block, pacemaker insertion  htn  dmii  Hyperlipidemia  Home meds, dc when ok with cardiology  Electronically signed by Maged Soni DO on 3/11/22 at 7:27 AM EST

## 2022-06-29 ENCOUNTER — HOSPITAL ENCOUNTER (OUTPATIENT)
Age: 77
Discharge: HOME | End: 2022-06-29
Payer: MEDICARE

## 2022-06-29 LAB
ALBUMIN SERPL-MCNC: 4.1 G/DL (ref 3.5–5.2)
ALP BLD-CCNC: 70 U/L (ref 35–104)
ALT SERPL-CCNC: 12 U/L (ref 0–32)
ANION GAP SERPL CALCULATED.3IONS-SCNC: 12 MMOL/L (ref 7–16)
AST SERPL-CCNC: 27 U/L (ref 0–31)
BILIRUB SERPL-MCNC: 0.2 MG/DL (ref 0–1.2)
BUN BLDV-MCNC: 35 MG/DL (ref 6–23)
CALCIUM SERPL-MCNC: 9.3 MG/DL (ref 8.6–10.2)
CHLORIDE BLD-SCNC: 102 MMOL/L (ref 98–107)
CO2: 24 MMOL/L (ref 22–29)
CREAT SERPL-MCNC: 2.3 MG/DL (ref 0.5–1)
GFR AFRICAN AMERICAN: 25
GFR NON-AFRICAN AMERICAN: 21 ML/MIN/1.73
GLUCOSE BLD-MCNC: 219 MG/DL (ref 74–99)
HCT VFR BLD CALC: 38.2 % (ref 34–48)
HEMOGLOBIN: 11.9 G/DL (ref 11.5–15.5)
MCH RBC QN AUTO: 26.4 PG (ref 26–35)
MCHC RBC AUTO-ENTMCNC: 31.2 % (ref 32–34.5)
MCV RBC AUTO: 84.7 FL (ref 80–99.9)
PARATHYROID HORMONE INTACT: 42 PG/ML (ref 15–65)
PDW BLD-RTO: 17.5 FL (ref 11.5–15)
PHOSPHORUS: 3.5 MG/DL (ref 2.5–4.5)
PLATELET # BLD: 164 E9/L (ref 130–450)
PMV BLD AUTO: 9.5 FL (ref 7–12)
POTASSIUM SERPL-SCNC: 4.7 MMOL/L (ref 3.5–5)
RBC # BLD: 4.51 E12/L (ref 3.5–5.5)
SODIUM BLD-SCNC: 138 MMOL/L (ref 132–146)
TOTAL PROTEIN: 6.1 G/DL (ref 6.4–8.3)
VITAMIN D 25-HYDROXY: 41 NG/ML (ref 30–100)
WBC # BLD: 4.3 E9/L (ref 4.5–11.5)

## 2022-06-29 PROCEDURE — 36415 COLL VENOUS BLD VENIPUNCTURE: CPT

## 2022-06-29 PROCEDURE — 84100 ASSAY OF PHOSPHORUS: CPT

## 2022-06-29 PROCEDURE — 85027 COMPLETE CBC AUTOMATED: CPT

## 2022-06-29 PROCEDURE — 82306 VITAMIN D 25 HYDROXY: CPT

## 2022-06-29 PROCEDURE — 80053 COMPREHEN METABOLIC PANEL: CPT

## 2022-06-29 PROCEDURE — 83970 ASSAY OF PARATHORMONE: CPT

## 2022-09-19 ENCOUNTER — HOSPITAL ENCOUNTER (OUTPATIENT)
Age: 77
Discharge: HOME | End: 2022-09-19
Payer: MEDICARE

## 2022-09-19 LAB
ALBUMIN SERPL-MCNC: 4 G/DL (ref 3.5–5.2)
ALP BLD-CCNC: 80 U/L (ref 35–104)
ALT SERPL-CCNC: 12 U/L (ref 0–32)
ANION GAP SERPL CALCULATED.3IONS-SCNC: 10 MMOL/L (ref 7–16)
AST SERPL-CCNC: 22 U/L (ref 0–31)
BILIRUB SERPL-MCNC: <0.2 MG/DL (ref 0–1.2)
BUN BLDV-MCNC: 28 MG/DL (ref 6–23)
CALCIUM SERPL-MCNC: 9.1 MG/DL (ref 8.6–10.2)
CHLORIDE BLD-SCNC: 103 MMOL/L (ref 98–107)
CO2: 25 MMOL/L (ref 22–29)
CREAT SERPL-MCNC: 1.9 MG/DL (ref 0.5–1)
GFR AFRICAN AMERICAN: 31
GFR NON-AFRICAN AMERICAN: 26 ML/MIN/1.73
GLUCOSE BLD-MCNC: 212 MG/DL (ref 74–99)
HCT VFR BLD CALC: 32.8 % (ref 34–48)
HEMOGLOBIN: 10.1 G/DL (ref 11.5–15.5)
MCH RBC QN AUTO: 27 PG (ref 26–35)
MCHC RBC AUTO-ENTMCNC: 30.8 % (ref 32–34.5)
MCV RBC AUTO: 87.7 FL (ref 80–99.9)
PARATHYROID HORMONE INTACT: 70 PG/ML (ref 15–65)
PDW BLD-RTO: 14.6 FL (ref 11.5–15)
PHOSPHORUS: 3 MG/DL (ref 2.5–4.5)
PLATELET # BLD: 152 E9/L (ref 130–450)
PMV BLD AUTO: 9.7 FL (ref 7–12)
POTASSIUM SERPL-SCNC: 4.4 MMOL/L (ref 3.5–5)
RBC # BLD: 3.74 E12/L (ref 3.5–5.5)
SODIUM BLD-SCNC: 138 MMOL/L (ref 132–146)
TOTAL PROTEIN: 5.9 G/DL (ref 6.4–8.3)
VITAMIN D 25-HYDROXY: 47 NG/ML (ref 30–100)
WBC # BLD: 4.5 E9/L (ref 4.5–11.5)

## 2022-09-19 PROCEDURE — 82306 VITAMIN D 25 HYDROXY: CPT

## 2022-09-19 PROCEDURE — 83970 ASSAY OF PARATHORMONE: CPT

## 2022-09-19 PROCEDURE — 85027 COMPLETE CBC AUTOMATED: CPT

## 2022-09-19 PROCEDURE — 36415 COLL VENOUS BLD VENIPUNCTURE: CPT

## 2022-09-19 PROCEDURE — 80053 COMPREHEN METABOLIC PANEL: CPT

## 2022-09-19 PROCEDURE — 84100 ASSAY OF PHOSPHORUS: CPT

## 2023-07-06 ENCOUNTER — APPOINTMENT (OUTPATIENT)
Dept: ULTRASOUND IMAGING | Age: 78
End: 2023-07-06
Payer: MEDICARE

## 2023-07-06 ENCOUNTER — APPOINTMENT (OUTPATIENT)
Dept: GENERAL RADIOLOGY | Age: 78
End: 2023-07-06
Payer: MEDICARE

## 2023-07-06 ENCOUNTER — HOSPITAL ENCOUNTER (EMERGENCY)
Age: 78
Discharge: HOME | End: 2023-07-06
Payer: MEDICARE

## 2023-07-06 VITALS
TEMPERATURE: 98.4 F | HEIGHT: 63 IN | OXYGEN SATURATION: 100 % | SYSTOLIC BLOOD PRESSURE: 167 MMHG | HEART RATE: 86 BPM | BODY MASS INDEX: 21.97 KG/M2 | DIASTOLIC BLOOD PRESSURE: 83 MMHG | WEIGHT: 124 LBS | RESPIRATION RATE: 16 BRPM

## 2023-07-06 DIAGNOSIS — M17.12 OSTEOARTHRITIS OF LEFT KNEE, UNSPECIFIED OSTEOARTHRITIS TYPE: ICD-10-CM

## 2023-07-06 DIAGNOSIS — R03.0 ELEVATED BLOOD PRESSURE READING: ICD-10-CM

## 2023-07-06 DIAGNOSIS — M71.22 SYNOVIAL CYST OF LEFT POPLITEAL SPACE: Primary | ICD-10-CM

## 2023-07-06 DIAGNOSIS — M25.562 ACUTE PAIN OF LEFT KNEE: ICD-10-CM

## 2023-07-06 PROCEDURE — 99284 EMERGENCY DEPT VISIT MOD MDM: CPT

## 2023-07-06 PROCEDURE — 93971 EXTREMITY STUDY: CPT

## 2023-07-06 PROCEDURE — 73562 X-RAY EXAM OF KNEE 3: CPT

## 2023-07-06 ASSESSMENT — PAIN - FUNCTIONAL ASSESSMENT: PAIN_FUNCTIONAL_ASSESSMENT: 0-10

## 2023-07-06 ASSESSMENT — PAIN DESCRIPTION - LOCATION: LOCATION: LEG

## 2023-07-06 ASSESSMENT — PAIN SCALES - GENERAL: PAINLEVEL_OUTOF10: 7

## 2023-07-06 NOTE — ED PROVIDER NOTES
Independent [facility] Visit. [facility]   ED  Encounter Note  Admit Date/RoomTime: 2023 12:06 PM  ED Room: 32    NAME: Gunner Hein  : [id number]  MRN: [id number]     Chief Complaint:  Leg Pain (Left leg pain since yesterday. Unable to straighten leg since. Left hip pain)    History of Present Illness       Reena Conway is a 69 y.o. old female who presents to the emergency department by private vehicle, for non-traumatic pain behind L knee and inability to straighten L knee which occured 1 day(s) prior to arrival. She denies fall or trauma. She states she went to an urgent care and was told that she has a Baker's cyst but they wanted her to come here to rule out a blood clot. The complaint is due to no known cause. Since onset the symptoms have been remaining constant. Pt notes that she has bad arthritis to that knee but is unable to get a replacement due to other chronic medical conditions. Her pain is aggraveated by use and movement and relieved by rest/sitting. Her weight bearing ability is: normal but has associated pain. She denies any head injury, headache, loss of consciousness, confusion, dizziness, neck pain, chest pain, SOB, pain with inspiration, abdominal pain, back pain, extremity injury, numbness, weakness, blurred vision, nausea, vomiting, fever, chills, wounds, or rash. She denies recent travel, surgery, calf pain/swelling, hx of blood clots, hx of CA, or hormone replacement therapy. Pt notes that she has sciatica which is her baseline. She denies changes in this or worsening lower back pain or L hip pain. She states this is not why she is here today and is not significantly painful. She denies loss of bowel/bladder function, saddle paresthesias, IV drug use, recent chiropractic manipulation, spinal manipulation/surgery/injections, fever, chills.     ROS   Pertinent positives and negatives are stated within HPI, all other

## 2024-01-18 LAB
HCT VFR BLD CALC: 33.7 % (ref 34–48)
HEMOGLOBIN: 10.4 G/DL (ref 11.5–15.5)

## (undated) DEVICE — E-Z CLEAN, NON-STICK, PTFE COATED, ELECTROSURGICAL BLADE ELECTRODE, MODIFIED EXTENDED INSULATION, 2.5 INCH (6.35 CM): Brand: MEGADYNE

## (undated) DEVICE — GOWN,AURORA,NONREINF,RAGLAN,L,STERILE: Brand: MEDLINE

## (undated) DEVICE — SET SURG INSTR MINI VASC

## (undated) DEVICE — GAUZE,SPONGE,POST-OP,4X3,STRL,LF: Brand: MEDLINE

## (undated) DEVICE — LABEL MED 4 IN SURG PANEL W/ PEN STRL

## (undated) DEVICE — BITEBLOCK 54FR W/ DENT RIM BLOX

## (undated) DEVICE — FORCEPS BX L240CM JAW DIA2.4MM WRK CHN 2.8MM ORNG L CAP W/

## (undated) DEVICE — CONNECTOR IRRIGATION AUXILIARY H2O JET W/ PRT MTL THRD HYDR

## (undated) DEVICE — SYRINGE MED 10ML POLYPR LUERSLIP TIP FLAT TOP W/O SFTY DISP

## (undated) DEVICE — SOLUTION IV 100ML 0.9% SOD CHL PLAS CONT USP VIAFLX 1 PER

## (undated) DEVICE — DRAPE C ARM UNIV W41XL74IN CLR PLAS XR VELC CLSR POLY STRP

## (undated) DEVICE — Z DUP USE 2257490 ADHESIVE SKIN CLSRE 036ML TPCL 2CTL CNCRLTE HIGH VSCSTY DRMB

## (undated) DEVICE — DEFENDO AIR WATER SUCTION AND BIOPSY VALVE KIT FOR  OLYMPUS: Brand: DEFENDO AIR/WATER/SUCTION AND BIOPSY VALVE

## (undated) DEVICE — CONTAINER SPEC 60ML PH 7NEUTRAL BUFF FRMLN RDY TO USE

## (undated) DEVICE — GOWN,SIRUS,FABRNF,XL,20/CS: Brand: MEDLINE

## (undated) DEVICE — DOUBLE BASIN SET: Brand: MEDLINE INDUSTRIES, INC.

## (undated) DEVICE — NEEDLE HYPO 25GA L1.5IN BLU POLYPR HUB S STL REG BVL STR

## (undated) DEVICE — BLADE CLIPPER GEN PURP NS

## (undated) DEVICE — GLOVE ORANGE PI 7 1/2   MSG9075

## (undated) DEVICE — GLOVE ORANGE PI 8   MSG9080

## (undated) DEVICE — SURGICAL PROCEDURE PACK VASC MAJ CUST

## (undated) DEVICE — MAGNETIC INSTR DRAPE 20X16: Brand: MEDLINE INDUSTRIES, INC.

## (undated) DEVICE — CANNULA NSL ORAL AD FOR CAPNOFLEX CO2 O2 AIRLFE

## (undated) DEVICE — GLOVE SURG SZ 7.5 L11.73IN FNGR THK9.8MIL STRW LTX POLYMER

## (undated) DEVICE — PACK,LAPAROTOMY,NO GOWNS: Brand: MEDLINE

## (undated) DEVICE — CLOTH SURG PREP PREOPERATIVE CHLORHEXIDINE GLUC 2% READYPREP

## (undated) DEVICE — PATIENT RETURN ELECTRODE, SINGLE-USE, CONTACT QUALITY MONITORING, ADULT, WITH 9FT CORD, FOR PATIENTS WEIGING OVER 33LBS. (15KG): Brand: MEGADYNE